# Patient Record
Sex: FEMALE | Race: WHITE | NOT HISPANIC OR LATINO
[De-identification: names, ages, dates, MRNs, and addresses within clinical notes are randomized per-mention and may not be internally consistent; named-entity substitution may affect disease eponyms.]

---

## 2017-02-13 ENCOUNTER — RX ONLY (OUTPATIENT)
Age: 72
Setting detail: RX ONLY
End: 2017-02-13

## 2017-03-14 ENCOUNTER — OFFICE VISIT (OUTPATIENT)
Dept: CARDIOLOGY | Facility: MEDICAL CENTER | Age: 72
End: 2017-03-14
Payer: MEDICARE

## 2017-03-14 VITALS
HEIGHT: 61 IN | HEART RATE: 66 BPM | DIASTOLIC BLOOD PRESSURE: 80 MMHG | BODY MASS INDEX: 33.99 KG/M2 | SYSTOLIC BLOOD PRESSURE: 140 MMHG | OXYGEN SATURATION: 96 % | WEIGHT: 180 LBS

## 2017-03-14 DIAGNOSIS — I51.89 DIASTOLIC DYSFUNCTION: Chronic | ICD-10-CM

## 2017-03-14 PROCEDURE — 4040F PNEUMOC VAC/ADMIN/RCVD: CPT | Performed by: INTERNAL MEDICINE

## 2017-03-14 PROCEDURE — G8432 DEP SCR NOT DOC, RNG: HCPCS | Performed by: INTERNAL MEDICINE

## 2017-03-14 PROCEDURE — 1036F TOBACCO NON-USER: CPT | Performed by: INTERNAL MEDICINE

## 2017-03-14 PROCEDURE — 3017F COLORECTAL CA SCREEN DOC REV: CPT | Mod: 8P | Performed by: INTERNAL MEDICINE

## 2017-03-14 PROCEDURE — 99214 OFFICE O/P EST MOD 30 MIN: CPT | Performed by: INTERNAL MEDICINE

## 2017-03-14 PROCEDURE — 3014F SCREEN MAMMO DOC REV: CPT | Performed by: INTERNAL MEDICINE

## 2017-03-14 PROCEDURE — G8484 FLU IMMUNIZE NO ADMIN: HCPCS | Performed by: INTERNAL MEDICINE

## 2017-03-14 PROCEDURE — G8419 CALC BMI OUT NRM PARAM NOF/U: HCPCS | Performed by: INTERNAL MEDICINE

## 2017-03-14 PROCEDURE — 1101F PT FALLS ASSESS-DOCD LE1/YR: CPT | Mod: 8P | Performed by: INTERNAL MEDICINE

## 2017-03-14 RX ORDER — LANSOPRAZOLE 30 MG/1
30 CAPSULE, DELAYED RELEASE ORAL 2 TIMES DAILY
COMMUNITY
End: 2023-01-18

## 2017-03-14 ASSESSMENT — ENCOUNTER SYMPTOMS
LOSS OF CONSCIOUSNESS: 0
DIZZINESS: 0
COUGH: 0
EYES NEGATIVE: 1
HEARTBURN: 0
WEIGHT LOSS: 0
INSOMNIA: 0
NAUSEA: 0
PALPITATIONS: 0
BRUISES/BLEEDS EASILY: 0
HEADACHES: 0
WHEEZING: 0
NERVOUS/ANXIOUS: 0
DEPRESSION: 0

## 2017-03-14 NOTE — PROGRESS NOTES
Subjective:   Maryam Gomez is a 71 y.o. female who presents today in regards to her history of diastolic dysfunction and HTN    Doing well  All meds as directed  changing care from my other partner  No setbacks, lost her 97yo mother - sad, in counselling  Has 6 cats - loves animals    Past Medical History   Diagnosis Date   • Anxiety    • Polio    • Osteopenia    • Hypothyroid    • Hypertension    • Hypercholesteremia    • GERD (gastroesophageal reflux disease)    • Cramp of limb 3/16/2012   • Diastolic dysfunction 3/16/2012   • Edema 3/16/2012   • Hyperlipidemia 3/16/2012   • Palpitations 3/16/2012   • Indigestion    • Hiatus hernia syndrome    • Glaucoma    • DVT (deep venous thrombosis) (CMS-HCC)      after a fall with 6mo coumadin   • Arthritis      osteoarthritis   • Heart burn      right shoulder     Past Surgical History   Procedure Laterality Date   • Hysterectomy laparoscopy  1984   • Other  1990     nose   • Tonsillectomy  1965   • Hip arthroplasty total  11/14/2012     Performed by Joseph Ricketts M.D. at [facility]   • Foot surgery  1993   • Shoulder decompression arthroscopic Right 5/18/2016     Procedure: SHOULDER DECOMPRESSION ARTHROSCOPIC - SUBACROMIAL;  Surgeon: Joseph Ricketts M.D.;  Location: SURGERY [facility];  Service:    • Shoulder arthroscopy w/ rotator cuff repair Right 5/18/2016     Procedure: SHOULDER ARTHROSCOPY W/ ROTATOR CUFF REPAIR ;  Surgeon: Joseph Ricketts M.D.;  Location: SURGERY [facility];  Service:      Family History   Problem Relation Age of Onset   • Heart Attack Neg Hx    • Heart Disease Neg Hx    • Heart Failure Neg Hx      History   Smoking status   • Former Smoker -- 1.00 packs/day for 27 years   • Types: Cigarettes   • Quit date: 01/01/1989   Smokeless tobacco   • Not on file     Comment: Quit 1989     No Known Allergies  Outpatient Encounter Prescriptions as of 3/14/2017   Medication Sig Dispense Refill   • aspirin EC  (ECOTRIN) 81 MG TBEC Take 81 mg by mouth every day.     • Acetaminophen (TYLENOL PO) Take 500 mg by mouth every day.     • Multiple Vitamins-Minerals (MULTI COMPLETE PO) Take  by mouth.     • Calcium-Magnesium-Vitamin D (CITRACAL CALCIUM+D PO) Take  by mouth.     • Glucosamine-Chondroitin (COSAMIN DS PO) Take  by mouth.     • B Complex Vitamins (VITAMIN B COMPLEX PO) Take  by mouth.     • LUTEIN by Does not apply route.     • latanoprost (XALATAN) 0.005 % SOLN Place 1 Drop in both eyes every evening.       • LACTASE ENZYME PO Take 2 Tabs by mouth every day.       • levothyroxine (SYNTHROID) 100 MCG TABS Take 100 mcg by mouth. 1 tab po 6 days a week     • lisinopril (PRINIVIL) 10 MG TABS Take 10 mg by mouth every day.     • atenolol (TENORMIN) 25 MG TABS Take 25 mg by mouth every day.     • simvastatin (ZOCOR) 10 MG TABS Take 10 mg by mouth every evening.     • loratadine (ALAVERT) 10 MG TABS Take 10 mg by mouth every day.     • lansoprazole (PREVACID) 30 MG CAPSULE DELAYED RELEASE Take 30 mg by mouth 2 times a day. One tab before breakfast/dinner     • Dexlansoprazole (DEXILANT) 60 MG CPDR Take 60 mg by mouth 2 Times a Day.       • estropipate (OGEN) 1.5 MG TABS Take 1.5 mg by mouth every day.       No facility-administered encounter medications on file as of 3/14/2017.     Review of Systems   Constitutional: Negative for weight loss and malaise/fatigue.   HENT: Negative for hearing loss.    Eyes: Negative.    Respiratory: Negative for cough and wheezing.    Cardiovascular: Negative for chest pain, palpitations and leg swelling.   Gastrointestinal: Negative for heartburn and nausea.   Genitourinary: Negative.    Neurological: Negative for dizziness, loss of consciousness and headaches.   Endo/Heme/Allergies: Does not bruise/bleed easily.   Psychiatric/Behavioral: Negative for depression. The patient is not nervous/anxious and does not have insomnia.    All other systems reviewed and are negative.       Objective:  "  /80 mmHg  Pulse 66  Ht 1.549 m (5' 0.98\")  Wt 81.647 kg (180 lb)  BMI 34.03 kg/m2  SpO2 96%    Physical Exam   Constitutional: She is oriented to person, place, and time. She appears well-developed and well-nourished.   HENT:   Head: Atraumatic.   Eyes: EOM are normal. Pupils are equal, round, and reactive to light. No scleral icterus.   Neck: No JVD present.   Cardiovascular: Normal rate, regular rhythm and intact distal pulses.    No murmur heard.  Pulmonary/Chest: Breath sounds normal.   Musculoskeletal: She exhibits no edema.   Lymphadenopathy:     She has no cervical adenopathy.   Neurological: She is alert and oriented to person, place, and time. No cranial nerve deficit.   Skin: Skin is warm and dry.   Psychiatric: She has a normal mood and affect. Her behavior is normal.       Assessment:     1. Diastolic dysfunction         Medical Decision Making:  Today's Assessment / Status / Plan:     Diastolic dysfunction, no sxs, discussed pathophysiology    HTN, Spent more than 25 min reviewing her chart  Echo from last year - normal & reassuring    Diet & exercise   No CHF symptoms, BP should be <130/70 - we discussed this  RTC 1y, sooner if concerns      "

## 2017-03-14 NOTE — MR AVS SNAPSHOT
"        Maryam Byrdmaria t   3/14/2017 9:45 AM   Office Visit   MRN: [id number]    Department:  [facility]   Dept Phone:  [phone]    Description:  Female : 1945   Provider:  Shelly Lane M.D.           Reason for Visit     Follow-Up           Allergies as of 3/14/2017     No Known Allergies      You were diagnosed with     Diastolic dysfunction   [981830]         Vital Signs     Blood Pressure Pulse Height Weight Body Mass Index Oxygen Saturation    140/80 mmHg 66 1.549 m (5' 0.98\") 81.647 kg (180 lb) 34.03 kg/m2 96%    Smoking Status                   Former Smoker           Basic Information     Date Of Birth Sex Race Ethnicity Preferred Language    1945 Female White Non- English      Problem List              ICD-10-CM Priority Class Noted - Resolved    Diastolic dysfunction (Chronic) I51.9 High  3/16/2012 - Present    Edema (Chronic) R60.9 High  3/16/2012 - Present    Hyperlipidemia (Chronic) E78.5 High  3/16/2012 - Present    Palpitations (Chronic) R00.2 High  3/16/2012 - Present    Anxiety F41.9 High  3/19/2012 - Present    Osteoarthritis M19.90   2012 - Present    Benign essential HTN I10   10/12/2015 - Present    Right shoulder pain M25.511   2016 - Present      Health Maintenance        Date Due Completion Dates    IMM DTaP/Tdap/Td Vaccine (1 - Tdap) 1962 ---    PAP SMEAR 1964 ---    COLONOSCOPY 1993 ---    IMM ZOSTER VACCINE 2003 ---    IMM PNEUMOCOCCAL 65+ (ADULT) LOW/MEDIUM RISK SERIES (2 of 2 - PCV13) 2011    IMM INFLUENZA (1) 2016    MAMMOGRAM 2017, 11/10/2015, 2014, 10/27/2014, 10/24/2013, 10/22/2012, 10/21/2011, 10/20/2010, 10/19/2009, 10/19/2009, 10/16/2008, 10/16/2008, 10/8/2007, 10/8/2007, 10/6/2006, 10/3/2005, 2004    BONE DENSITY 3/4/2020 3/4/2015, 2013, 2011, 2009, 2007, 10/20/2004            Current Immunizations     Influenza TIV (IM) 2012   " Pneumococcal polysaccharide vaccine (PPSV-23) 1/1/2010      Below and/or attached are the medications your provider expects you to take. Review all of your home medications and newly ordered medications with your provider and/or pharmacist. Follow medication instructions as directed by your provider and/or pharmacist. Please keep your medication list with you and share with your provider. Update the information when medications are discontinued, doses are changed, or new medications (including over-the-counter products) are added; and carry medication information at all times in the event of emergency situations     Allergies:  No Known Allergies          Medications  Valid as of: March 14, 2017 - 10:10 AM    Generic Name Brand Name Tablet Size Instructions for use    Acetaminophen   Take 500 mg by mouth every day.        Aspirin (Tablet Delayed Response) ECOTRIN 81 MG Take 81 mg by mouth every day.        Atenolol (Tab) TENORMIN 25 MG Take 25 mg by mouth every day.        B Complex Vitamins   Take  by mouth.        Calcium-Magnesium-Vitamin D   Take  by mouth.        Dexlansoprazole (CAPSULE DELAYED RELEASE) Dexlansoprazole 60 MG Take 60 mg by mouth 2 Times a Day.          Estropipate (Tab) OGEN 1.5 MG Take 1.5 mg by mouth every day.        Glucosamine-Chondroitin   Take  by mouth.        Lactase   Take 2 Tabs by mouth every day.          Lansoprazole (CAPSULE DELAYED RELEASE) PREVACID 30 MG Take 30 mg by mouth 2 times a day. One tab before breakfast/dinner        Latanoprost (Solution) XALATAN 0.005 % Place 1 Drop in both eyes every evening.          Levothyroxine Sodium (Tab) SYNTHROID 100 MCG Take 100 mcg by mouth. 1 tab po 6 days a week        Lisinopril (Tab) PRINIVIL 10 MG Take 10 mg by mouth every day.        Loratadine (Tab) CLARITIN 10 MG Take 10 mg by mouth every day.        Lutein   by Does not apply route.        Multiple Vitamins-Minerals   Take  by mouth.        Simvastatin (Tab) ZOCOR 10 MG Take 10  mg by mouth every evening.        .                 Medicines prescribed today were sent to:     [id number] - [place], [place] - [address] [facility] [address] [facility] [facility]    [address] [place] [address] [place] [address]    Phone: [phone] Fax: [phone]    Open 24 Hours?: No      Medication refill instructions:       If your prescription bottle indicates you have medication refills left, it is not necessary to call your provider’s office. Please contact your pharmacy and they will refill your medication.    If your prescription bottle indicates you do not have any refills left, you may request refills at any time through one of the following ways: The online [employer] system (except Urgent Care), by calling your provider’s office, or by asking your pharmacy to contact your provider’s office with a refill request. Medication refills are processed only during regular business hours and may not be available until the next business day. Your provider may request additional information or to have a follow-up visit with you prior to refilling your medication.   *Please Note: Medication refills are assigned a new Rx number when refilled electronically. Your pharmacy may indicate that no refills were authorized even though a new prescription for the same medication is available at the pharmacy. Please request the medicine by name with the pharmacy before contacting your provider for a refill.           Rufus Buck Productionhart Status: Patient Declined

## 2017-09-13 ENCOUNTER — HOSPITAL ENCOUNTER (OUTPATIENT)
Dept: LAB | Facility: MEDICAL CENTER | Age: 72
End: 2017-09-13
Attending: FAMILY MEDICINE
Payer: MEDICARE

## 2017-09-13 LAB
ALBUMIN SERPL BCP-MCNC: 4.5 G/DL (ref 3.2–4.9)
ALBUMIN/GLOB SERPL: 1.7 G/DL
ALP SERPL-CCNC: 55 U/L (ref 30–99)
ALT SERPL-CCNC: 26 U/L (ref 2–50)
ANION GAP SERPL CALC-SCNC: 8 MMOL/L (ref 0–11.9)
APPEARANCE UR: ABNORMAL
AST SERPL-CCNC: 25 U/L (ref 12–45)
BACTERIA #/AREA URNS HPF: ABNORMAL /HPF
BASOPHILS # BLD AUTO: 1.3 % (ref 0–1.8)
BASOPHILS # BLD: 0.06 K/UL (ref 0–0.12)
BILIRUB SERPL-MCNC: 0.5 MG/DL (ref 0.1–1.5)
BILIRUB UR QL STRIP.AUTO: NEGATIVE
BUN SERPL-MCNC: 22 MG/DL (ref 8–22)
CALCIUM SERPL-MCNC: 9.4 MG/DL (ref 8.5–10.5)
CHLORIDE SERPL-SCNC: 104 MMOL/L (ref 96–112)
CHOLEST SERPL-MCNC: 234 MG/DL (ref 100–199)
CO2 SERPL-SCNC: 24 MMOL/L (ref 20–33)
COLOR UR: YELLOW
CREAT SERPL-MCNC: 0.65 MG/DL (ref 0.5–1.4)
EOSINOPHIL # BLD AUTO: 0.46 K/UL (ref 0–0.51)
EOSINOPHIL NFR BLD: 10 % (ref 0–6.9)
EPI CELLS #/AREA URNS HPF: ABNORMAL /HPF
ERYTHROCYTE [DISTWIDTH] IN BLOOD BY AUTOMATED COUNT: 47.8 FL (ref 35.9–50)
EST. AVERAGE GLUCOSE BLD GHB EST-MCNC: 123 MG/DL
GFR SERPL CREATININE-BSD FRML MDRD: >60 ML/MIN/1.73 M 2
GLOBULIN SER CALC-MCNC: 2.6 G/DL (ref 1.9–3.5)
GLUCOSE SERPL-MCNC: 103 MG/DL (ref 65–99)
GLUCOSE UR STRIP.AUTO-MCNC: NEGATIVE MG/DL
HBA1C MFR BLD: 5.9 % (ref 0–5.6)
HCT VFR BLD AUTO: 41.4 % (ref 37–47)
HDLC SERPL-MCNC: 51 MG/DL
HGB BLD-MCNC: 13.8 G/DL (ref 12–16)
HYALINE CASTS #/AREA URNS LPF: ABNORMAL /LPF
IMM GRANULOCYTES # BLD AUTO: 0.01 K/UL (ref 0–0.11)
IMM GRANULOCYTES NFR BLD AUTO: 0.2 % (ref 0–0.9)
KETONES UR STRIP.AUTO-MCNC: NEGATIVE MG/DL
LDLC SERPL CALC-MCNC: 120 MG/DL
LEUKOCYTE ESTERASE UR QL STRIP.AUTO: ABNORMAL
LYMPHOCYTES # BLD AUTO: 1.79 K/UL (ref 1–4.8)
LYMPHOCYTES NFR BLD: 38.7 % (ref 22–41)
MCH RBC QN AUTO: 31.3 PG (ref 27–33)
MCHC RBC AUTO-ENTMCNC: 33.3 G/DL (ref 33.6–35)
MCV RBC AUTO: 93.9 FL (ref 81.4–97.8)
MICRO URNS: ABNORMAL
MONOCYTES # BLD AUTO: 0.38 K/UL (ref 0–0.85)
MONOCYTES NFR BLD AUTO: 8.2 % (ref 0–13.4)
NEUTROPHILS # BLD AUTO: 1.92 K/UL (ref 2–7.15)
NEUTROPHILS NFR BLD: 41.6 % (ref 44–72)
NITRITE UR QL STRIP.AUTO: NEGATIVE
NRBC # BLD AUTO: 0 K/UL
NRBC BLD AUTO-RTO: 0 /100 WBC
PH UR STRIP.AUTO: 5.5 [PH]
PLATELET # BLD AUTO: 197 K/UL (ref 164–446)
PMV BLD AUTO: 8.9 FL (ref 9–12.9)
POTASSIUM SERPL-SCNC: 4.3 MMOL/L (ref 3.6–5.5)
PROT SERPL-MCNC: 7.1 G/DL (ref 6–8.2)
PROT UR QL STRIP: NEGATIVE MG/DL
RBC # BLD AUTO: 4.41 M/UL (ref 4.2–5.4)
RBC # URNS HPF: ABNORMAL /HPF
RBC UR QL AUTO: NEGATIVE
RENAL EPI CELLS #/AREA URNS HPF: ABNORMAL /HPF
SODIUM SERPL-SCNC: 136 MMOL/L (ref 135–145)
SP GR UR STRIP.AUTO: 1.02
TRIGL SERPL-MCNC: 317 MG/DL (ref 0–149)
TSH SERPL DL<=0.005 MIU/L-ACNC: 0.49 UIU/ML (ref 0.3–3.7)
UROBILINOGEN UR STRIP.AUTO-MCNC: 0.2 MG/DL
WBC # BLD AUTO: 4.6 K/UL (ref 4.8–10.8)
WBC #/AREA URNS HPF: ABNORMAL /HPF

## 2017-09-13 PROCEDURE — 80053 COMPREHEN METABOLIC PANEL: CPT

## 2017-09-13 PROCEDURE — 36415 COLL VENOUS BLD VENIPUNCTURE: CPT

## 2017-09-13 PROCEDURE — 85025 COMPLETE CBC W/AUTO DIFF WBC: CPT

## 2017-09-13 PROCEDURE — 84443 ASSAY THYROID STIM HORMONE: CPT

## 2017-09-13 PROCEDURE — 81001 URINALYSIS AUTO W/SCOPE: CPT

## 2017-09-13 PROCEDURE — 83036 HEMOGLOBIN GLYCOSYLATED A1C: CPT | Mod: GA

## 2017-09-13 PROCEDURE — 80061 LIPID PANEL: CPT

## 2017-11-27 ENCOUNTER — HOSPITAL ENCOUNTER (OUTPATIENT)
Dept: RADIOLOGY | Facility: MEDICAL CENTER | Age: 72
End: 2017-11-27
Attending: FAMILY MEDICINE
Payer: MEDICARE

## 2017-11-27 DIAGNOSIS — Z12.31 SCREENING MAMMOGRAM, ENCOUNTER FOR: ICD-10-CM

## 2017-11-27 PROCEDURE — G0202 SCR MAMMO BI INCL CAD: HCPCS

## 2017-12-07 ENCOUNTER — HOSPITAL ENCOUNTER (OUTPATIENT)
Dept: RADIOLOGY | Facility: MEDICAL CENTER | Age: 72
End: 2017-12-07
Attending: FAMILY MEDICINE
Payer: MEDICARE

## 2017-12-07 DIAGNOSIS — M89.9 DISORDER OF BONE AND CARTILAGE: ICD-10-CM

## 2017-12-07 DIAGNOSIS — M94.9 DISORDER OF BONE AND CARTILAGE: ICD-10-CM

## 2017-12-07 PROCEDURE — 77080 DXA BONE DENSITY AXIAL: CPT

## 2018-02-05 ENCOUNTER — APPOINTMENT (RX ONLY)
Dept: URBAN - METROPOLITAN AREA CLINIC 4 | Facility: CLINIC | Age: 73
Setting detail: DERMATOLOGY
End: 2018-02-05

## 2018-02-05 DIAGNOSIS — L57.8 OTHER SKIN CHANGES DUE TO CHRONIC EXPOSURE TO NONIONIZING RADIATION: ICD-10-CM

## 2018-02-05 DIAGNOSIS — D18.0 HEMANGIOMA: ICD-10-CM

## 2018-02-05 DIAGNOSIS — L57.0 ACTINIC KERATOSIS: ICD-10-CM

## 2018-02-05 DIAGNOSIS — D22 MELANOCYTIC NEVI: ICD-10-CM

## 2018-02-05 DIAGNOSIS — L90.5 SCAR CONDITIONS AND FIBROSIS OF SKIN: ICD-10-CM

## 2018-02-05 DIAGNOSIS — Z85.828 PERSONAL HISTORY OF OTHER MALIGNANT NEOPLASM OF SKIN: ICD-10-CM

## 2018-02-05 DIAGNOSIS — L82.1 OTHER SEBORRHEIC KERATOSIS: ICD-10-CM

## 2018-02-05 DIAGNOSIS — L81.4 OTHER MELANIN HYPERPIGMENTATION: ICD-10-CM

## 2018-02-05 PROBLEM — D22.5 MELANOCYTIC NEVI OF TRUNK: Status: ACTIVE | Noted: 2018-02-05

## 2018-02-05 PROBLEM — D18.01 HEMANGIOMA OF SKIN AND SUBCUTANEOUS TISSUE: Status: ACTIVE | Noted: 2018-02-05

## 2018-02-05 PROCEDURE — ? COUNSELING

## 2018-02-05 PROCEDURE — ? LIQUID NITROGEN

## 2018-02-05 PROCEDURE — ? PATIENT SPECIFIC COUNSELING

## 2018-02-05 PROCEDURE — 17000 DESTRUCT PREMALG LESION: CPT

## 2018-02-05 PROCEDURE — 17003 DESTRUCT PREMALG LES 2-14: CPT

## 2018-02-05 PROCEDURE — 99213 OFFICE O/P EST LOW 20 MIN: CPT | Mod: 25

## 2018-02-05 ASSESSMENT — LOCATION SIMPLE DESCRIPTION DERM
LOCATION SIMPLE: LEFT HAND
LOCATION SIMPLE: RIGHT CHEEK
LOCATION SIMPLE: LEFT WRIST
LOCATION SIMPLE: LEFT FOREHEAD
LOCATION SIMPLE: LEFT EAR
LOCATION SIMPLE: LEFT UPPER BACK
LOCATION SIMPLE: INFERIOR FOREHEAD
LOCATION SIMPLE: RIGHT ANTERIOR NECK
LOCATION SIMPLE: RIGHT HAND
LOCATION SIMPLE: RIGHT UPPER BACK

## 2018-02-05 ASSESSMENT — LOCATION DETAILED DESCRIPTION DERM
LOCATION DETAILED: INFERIOR MID FOREHEAD
LOCATION DETAILED: LEFT RADIAL DORSAL HAND
LOCATION DETAILED: RIGHT INFERIOR CENTRAL MALAR CHEEK
LOCATION DETAILED: RIGHT SUPERIOR UPPER BACK
LOCATION DETAILED: RIGHT INFERIOR MEDIAL UPPER BACK
LOCATION DETAILED: RIGHT INFERIOR ANTERIOR NECK
LOCATION DETAILED: LEFT DORSAL WRIST
LOCATION DETAILED: LEFT INFERIOR MEDIAL FOREHEAD
LOCATION DETAILED: LEFT ANTIHELIX
LOCATION DETAILED: RIGHT RADIAL DORSAL HAND
LOCATION DETAILED: LEFT SUPERIOR UPPER BACK
LOCATION DETAILED: LEFT ANTITRAGUS
LOCATION DETAILED: LEFT ULNAR DORSAL HAND

## 2018-02-05 ASSESSMENT — LOCATION ZONE DERM
LOCATION ZONE: ARM
LOCATION ZONE: HAND
LOCATION ZONE: TRUNK
LOCATION ZONE: NECK
LOCATION ZONE: EAR
LOCATION ZONE: FACE

## 2018-02-05 NOTE — HPI: FOLLOW UP OTHER
What Is Your Reason For Requesting A Follow-Up Appointment?: Skin check on exposed areas face and arms

## 2018-02-05 NOTE — PROCEDURE: LIQUID NITROGEN
Aperture Size (Optional): C
Consent: The patient's consent was obtained including but not limited to risks of crusting, scabbing, blistering, scarring, darker or lighter pigmentary change, recurrence, incomplete removal and infection.
Post-Care Instructions: I reviewed with the patient in detail post-care instructions. Patient is to wear sunprotection, and avoid picking at any of the treated lesions. Pt may apply Vaseline to crusted or scabbing areas.
Detail Level: Simple
Number Of Freeze-Thaw Cycles: 2 freeze-thaw cycles
Duration Of Freeze Thaw-Cycle (Seconds): 3
Render Post-Care Instructions In Note?: no

## 2018-03-08 ENCOUNTER — HOSPITAL ENCOUNTER (OUTPATIENT)
Facility: MEDICAL CENTER | Age: 73
End: 2018-03-08
Attending: FAMILY MEDICINE
Payer: MEDICARE

## 2018-03-08 LAB
APPEARANCE UR: CLEAR
BACTERIA #/AREA URNS HPF: NEGATIVE /HPF
BILIRUB UR QL STRIP.AUTO: NEGATIVE
COLOR UR: YELLOW
CULTURE IF INDICATED INDCX: YES UA CULTURE
EPI CELLS #/AREA URNS HPF: ABNORMAL /HPF
GLUCOSE UR STRIP.AUTO-MCNC: NEGATIVE MG/DL
HYALINE CASTS #/AREA URNS LPF: ABNORMAL /LPF
KETONES UR STRIP.AUTO-MCNC: NEGATIVE MG/DL
LEUKOCYTE ESTERASE UR QL STRIP.AUTO: ABNORMAL
MICRO URNS: ABNORMAL
NITRITE UR QL STRIP.AUTO: NEGATIVE
PH UR STRIP.AUTO: 6 [PH]
PROT UR QL STRIP: NEGATIVE MG/DL
RBC # URNS HPF: ABNORMAL /HPF
RBC UR QL AUTO: NEGATIVE
SP GR UR STRIP.AUTO: 1.02
UROBILINOGEN UR STRIP.AUTO-MCNC: 0.2 MG/DL
WBC #/AREA URNS HPF: ABNORMAL /HPF

## 2018-03-08 PROCEDURE — 81001 URINALYSIS AUTO W/SCOPE: CPT

## 2018-03-08 PROCEDURE — 87086 URINE CULTURE/COLONY COUNT: CPT

## 2018-03-10 LAB
BACTERIA UR CULT: NORMAL
SIGNIFICANT IND 70042: NORMAL
SITE SITE: NORMAL
SOURCE SOURCE: NORMAL

## 2018-04-02 ENCOUNTER — OFFICE VISIT (OUTPATIENT)
Dept: CARDIOLOGY | Facility: MEDICAL CENTER | Age: 73
End: 2018-04-02
Payer: MEDICARE

## 2018-04-02 VITALS
DIASTOLIC BLOOD PRESSURE: 80 MMHG | BODY MASS INDEX: 35.5 KG/M2 | HEART RATE: 76 BPM | HEIGHT: 61 IN | WEIGHT: 188 LBS | OXYGEN SATURATION: 96 % | SYSTOLIC BLOOD PRESSURE: 110 MMHG

## 2018-04-02 DIAGNOSIS — I51.89 DIASTOLIC DYSFUNCTION: Chronic | ICD-10-CM

## 2018-04-02 DIAGNOSIS — E78.2 MIXED HYPERLIPIDEMIA: ICD-10-CM

## 2018-04-02 DIAGNOSIS — R60.0 LOCALIZED EDEMA: Chronic | ICD-10-CM

## 2018-04-02 DIAGNOSIS — I10 BENIGN ESSENTIAL HTN: Primary | ICD-10-CM

## 2018-04-02 PROCEDURE — 99214 OFFICE O/P EST MOD 30 MIN: CPT | Performed by: NURSE PRACTITIONER

## 2018-04-02 RX ORDER — RANITIDINE 150 MG/1
150 TABLET ORAL 2 TIMES DAILY
COMMUNITY
End: 2021-05-16

## 2018-04-02 RX ORDER — CARVEDILOL 6.25 MG/1
6.25 TABLET ORAL 2 TIMES DAILY
COMMUNITY

## 2018-04-02 RX ORDER — ATORVASTATIN CALCIUM 20 MG/1
20 TABLET, FILM COATED ORAL EVERY EVENING
Qty: 30 TAB | Refills: 11 | Status: SHIPPED | OUTPATIENT
Start: 2018-04-02

## 2018-04-02 ASSESSMENT — ENCOUNTER SYMPTOMS
SHORTNESS OF BREATH: 0
CLAUDICATION: 0
ABDOMINAL PAIN: 0
CHILLS: 0
MYALGIAS: 0
ORTHOPNEA: 0
PND: 0
COUGH: 0
DIZZINESS: 0
WEAKNESS: 0
PALPITATIONS: 0

## 2018-04-02 NOTE — PROGRESS NOTES
"Chief Complaint   Patient presents with   • Hypertension     follow up        Subjective:   Maryam \"Char\"  Emilie Gomez is a 72 y.o. female who presents today to follow-up on hypertension, edema and diastolic dysfunction. She was last seen a year ago by Dr. Shelly Lane.    Since that time, she has been fairly stable. Her blood pressures been running 120-130/70-80 at home. She denies any significant palpitations. She tells me her ankle edema is much improved.    She continues to be upset about the passing of her 98-year-old mother. 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    Hormone replacement therapy was discontinued about 6 months ago per her primary care. Patient states she has gained about 20 pounds over that 6 month time. She is not exercising on a regular basis. She is unhappy with the way her body feels with the extra weight.    Past Medical History:   Diagnosis Date   • Anxiety    • Arthritis     osteoarthritis   • Cramp of limb 3/16/2012   • Diastolic dysfunction 3/16/2012   • DVT (deep venous thrombosis) (CMS-[facility], located [facility])     after a fall with 6mo coumadin   • Edema 3/16/2012   • GERD (gastroesophageal reflux disease)    • Glaucoma    • Heart burn     right shoulder   • Hiatus hernia syndrome    • Hypercholesteremia    • Hyperlipidemia 3/16/2012   • Hypertension    • Hypothyroid    • Indigestion    • Osteopenia    • Palpitations 3/16/2012   • Polio      Past Surgical History:   Procedure Laterality Date   • SHOULDER DECOMPRESSION ARTHROSCOPIC Right 5/18/2016    Procedure: SHOULDER DECOMPRESSION ARTHROSCOPIC - SUBACROMIAL;  Surgeon: Joseph Ricketts M.D.;  Location: SURGERY [facility];  Service:    • SHOULDER ARTHROSCOPY W/ ROTATOR CUFF REPAIR Right 5/18/2016    Procedure: SHOULDER ARTHROSCOPY W/ ROTATOR CUFF REPAIR ;  Surgeon: Joseph Ricketts M.D.;  Location: SURGERY [facility];  Service:    • HIP ARTHROPLASTY TOTAL  11/14/2012    " Performed by Joseph Ricketts M.D. at SURGERY [facility]   • FOOT SURGERY  1993   • OTHER  1990    nose   • HYSTERECTOMY LAPAROSCOPY  1984   • TONSILLECTOMY  1965     Family History   Problem Relation Age of Onset   • Heart Attack Neg Hx    • Heart Disease Neg Hx    • Heart Failure Neg Hx      Social History     Social History   • Marital status:      Spouse name: N/A   • Number of children: N/A   • Years of education: N/A     Occupational History   • Not on file.     Social History Main Topics   • Smoking status: Former Smoker     Packs/day: 1.00     Years: 27.00     Types: Cigarettes     Quit date: 1/1/1989   • Smokeless tobacco: Never Used      Comment: Quit 1989   • Alcohol use Yes      Comment: 2 per day   • Drug use: No   • Sexual activity: Not on file     Other Topics Concern   • Not on file     Social History Narrative   • No narrative on file     No Known Allergies  Outpatient Encounter Prescriptions as of 4/2/2018   Medication Sig Dispense Refill   • raNITidine (ZANTAC) 150 MG Tab Take 150 mg by mouth 2 times a day.     • carvedilol (COREG) 6.25 MG Tab Take 6.25 mg by mouth 2 times a day, with meals.     • atorvastatin (LIPITOR) 20 MG Tab Take 1 Tab by mouth every evening. 30 Tab 11   • lansoprazole (PREVACID) 30 MG CAPSULE DELAYED RELEASE Take 30 mg by mouth 2 times a day. One tab before breakfast/dinner     • aspirin EC (ECOTRIN) 81 MG TBEC Take 81 mg by mouth every day.     • Acetaminophen (TYLENOL PO) Take 500 mg by mouth every day.     • Multiple Vitamins-Minerals (MULTI COMPLETE PO) Take  by mouth.     • Calcium-Magnesium-Vitamin D (CITRACAL CALCIUM+D PO) Take  by mouth.     • Glucosamine-Chondroitin (COSAMIN DS PO) Take  by mouth.     • B Complex Vitamins (VITAMIN B COMPLEX PO) Take  by mouth.     • LUTEIN by Does not apply route.     • latanoprost (XALATAN) 0.005 % SOLN Place 1 Drop in both eyes every evening.       • levothyroxine (SYNTHROID) 100 MCG TABS Take 100 mcg by mouth. 1  "tab po 6 days a week     • lisinopril (PRINIVIL) 10 MG TABS Take 10 mg by mouth every day.     • loratadine (ALAVERT) 10 MG TABS Take 10 mg by mouth every day.     • [DISCONTINUED] Dexlansoprazole (DEXILANT) 60 MG CPDR Take 60 mg by mouth 2 Times a Day.       • [DISCONTINUED] LACTASE ENZYME PO Take 2 Tabs by mouth every day.       • [DISCONTINUED] estropipate (OGEN) 1.5 MG TABS Take 1.5 mg by mouth every day.     • [DISCONTINUED] atenolol (TENORMIN) 25 MG TABS Take 25 mg by mouth every day.     • [DISCONTINUED] simvastatin (ZOCOR) 10 MG TABS Take 10 mg by mouth every evening.       No facility-administered encounter medications on file as of 4/2/2018.      Review of Systems   Constitutional: Negative for chills and malaise/fatigue.   Respiratory: Negative for cough and shortness of breath.    Cardiovascular: Positive for leg swelling (improved). Negative for chest pain, palpitations, orthopnea, claudication and PND.   Gastrointestinal: Negative for abdominal pain.   Musculoskeletal: Negative for myalgias.   Neurological: Negative for dizziness and weakness.        Objective:   /80   Pulse 76   Ht 1.549 m (5' 1\")   Wt 85.3 kg (188 lb)   SpO2 96%   BMI 35.52 kg/m²     Physical Exam   Constitutional: She is oriented to person, place, and time. She appears well-developed and well-nourished.   HENT:   Head: Normocephalic.   Eyes: EOM are normal.   Neck: No JVD present.   Cardiovascular: Normal rate, regular rhythm and normal heart sounds.    Pulmonary/Chest: Effort normal and breath sounds normal.   Abdominal: Soft. Bowel sounds are normal.   Musculoskeletal: She exhibits edema (trace bilateral ankles.).   Neurological: She is alert and oriented to person, place, and time.   Skin: Skin is warm and dry.   Psychiatric: She has a normal mood and affect.     March 9, 2015: Transthoracic Echo Report  Normal left ventricular systolic function.  Grade I diastolic dysfunction - mitral inflow E/A is <1.0.  Mild " concentric left ventricular hypertrophy.  Mild mitral regurgitation.  Trivial pulmonic valve insufficiency.  Compared to the prior echo dated 7/8/2012, there has been no   significant change.    Results for GAYATHRI ORDONEZ (MRN [id number])    Ref. Range 9/13/2017 07:52   Sodium Latest Ref Range: 135 - 145 mmol/L 136   Potassium Latest Ref Range: 3.6 - 5.5 mmol/L 4.3   Chloride Latest Ref Range: 96 - 112 mmol/L 104   Co2 Latest Ref Range: 20 - 33 mmol/L 24   Anion Gap Latest Ref Range: 0.0 - 11.9  8.0   Glucose Latest Ref Range: 65 - 99 mg/dL 103 (H)   Bun Latest Ref Range: 8 - 22 mg/dL 22   Creatinine Latest Ref Range: 0.50 - 1.40 mg/dL 0.65   GFR If  Latest Ref Range: >60 mL/min/1.73 m 2 >60   GFR If Non  Latest Ref Range: >60 mL/min/1.73 m 2 >60   Calcium Latest Ref Range: 8.5 - 10.5 mg/dL 9.4   AST(SGOT) Latest Ref Range: 12 - 45 U/L 25   ALT(SGPT) Latest Ref Range: 2 - 50 U/L 26   Alkaline Phosphatase Latest Ref Range: 30 - 99 U/L 55   Total Bilirubin Latest Ref Range: 0.1 - 1.5 mg/dL 0.5   Albumin Latest Ref Range: 3.2 - 4.9 g/dL 4.5   Total Protein Latest Ref Range: 6.0 - 8.2 g/dL 7.1   Globulin Latest Ref Range: 1.9 - 3.5 g/dL 2.6   A-G Ratio Latest Units: g/dL 1.7   Glycohemoglobin Latest Ref Range: 0.0 - 5.6 % 5.9 (H)   Estim. Avg Glu Latest Units: mg/dL 123   Cholesterol,Tot Latest Ref Range: 100 - 199 mg/dL 234 (H)   Triglycerides Latest Ref Range: 0 - 149 mg/dL 317 (H)   HDL Latest Ref Range: >=40 mg/dL 51   LDL Latest Ref Range: <100 mg/dL 120 (H)       Assessment:     1. Benign essential HTN     2. Mixed hyperlipidemia  COMP METABOLIC PANEL    LIPID PROFILE   3. Diastolic dysfunction     4. Localized edema         Medical Decision Making:  Today's Assessment / Status / Plan:     Hypertension: Her blood pressure is excellent in the office today. She will continue on her current regimen.    Hyperlipidemia: Her last lipids which were done in September were not to  goal. She's been taking simvastatin 10 mg. Since our interactions with simvastatin, I will change her to atorvastatin 20 mg daily. We'll check lipids and metabolic panel in 3 months. Her LDL goal is less than 100. She is encouraged to limit total calories especially sweets and carbs since her triglycerides are elevated. I recommended she get some regular exercise, possibly a stationary bike since she has knee and hip problems.    Diastolic dysfunction: Grade 1 per echocardiogram done in 2015. She is stable in this regard and has no fluid overload signs.    Edema: Improved. Continue current regimen.    She does not have a cardiac problem that needs to be followed closely in our office therefore she can follow-up with her primary care. However she is on is welcome to return to practice if needed for cardiac issues.    Collaborating Provider: Dr. Norton.

## 2018-04-02 NOTE — LETTER
"     [facility]   [address], [facility] [address]  [place] [address]  Phone: [phone]  Fax: [phone]              Maryam Gomez  1945    Encounter Date: 4/2/2018    ANDREA Velez          PROGRESS NOTE:  Chief Complaint   Patient presents with   • Hypertension     follow up        Subjective:   Maryam \"Char\"  Emilie Gomez is a 72 y.o. female who presents today to follow-up on hypertension, edema and diastolic dysfunction. She was last seen a year ago by Dr. Shelly Lane.    Since that time, she has been fairly stable. Her blood pressures been running 120-130/70-80 at home. She denies any significant palpitations. She tells me her ankle edema is much improved.    She continues to be upset about the passing of her 98-year-old mother. 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    Hormone replacement therapy was discontinued about 6 months ago per her primary care. Patient states she has gained about 20 pounds over that 6 month time. She is not exercising on a regular basis. She is unhappy with the way her body feels with the extra weight.    Past Medical History:   Diagnosis Date   • Anxiety    • Arthritis     osteoarthritis   • Cramp of limb 3/16/2012   • Diastolic dysfunction 3/16/2012   • DVT (deep venous thrombosis) ([facility])     after a fall with 6mo coumadin   • Edema 3/16/2012   • GERD (gastroesophageal reflux disease)    • Glaucoma    • Heart burn     right shoulder   • Hiatus hernia syndrome    • Hypercholesteremia    • Hyperlipidemia 3/16/2012   • Hypertension    • Hypothyroid    • Indigestion    • Osteopenia    • Palpitations 3/16/2012   • Polio      Past Surgical History:   Procedure Laterality Date   • SHOULDER DECOMPRESSION ARTHROSCOPIC Right 5/18/2016    Procedure: SHOULDER DECOMPRESSION ARTHROSCOPIC - SUBACROMIAL;  Surgeon: Joseph Ricketts M.D.;  Location: SURGERY [facility]" ORS;  Service:    • SHOULDER ARTHROSCOPY W/ ROTATOR CUFF REPAIR Right 5/18/2016    Procedure: SHOULDER ARTHROSCOPY W/ ROTATOR CUFF REPAIR ;  Surgeon: Joseph Ricketts M.D.;  Location: SURGERY [facility];  Service:    • HIP ARTHROPLASTY TOTAL  11/14/2012    Performed by Joseph Ricketts M.D. at SURGERY [facility] ORS   • FOOT SURGERY  1993   • OTHER  1990    nose   • HYSTERECTOMY LAPAROSCOPY  1984   • TONSILLECTOMY  1965     Family History   Problem Relation Age of Onset   • Heart Attack Neg Hx    • Heart Disease Neg Hx    • Heart Failure Neg Hx      Social History     Social History   • Marital status:      Spouse name: N/A   • Number of children: N/A   • Years of education: N/A     Occupational History   • Not on file.     Social History Main Topics   • Smoking status: Former Smoker     Packs/day: 1.00     Years: 27.00     Types: Cigarettes     Quit date: 1/1/1989   • Smokeless tobacco: Never Used      Comment: Quit 1989   • Alcohol use Yes      Comment: 2 per day   • Drug use: No   • Sexual activity: Not on file     Other Topics Concern   • Not on file     Social History Narrative   • No narrative on file     No Known Allergies  Outpatient Encounter Prescriptions as of 4/2/2018   Medication Sig Dispense Refill   • raNITidine (ZANTAC) 150 MG Tab Take 150 mg by mouth 2 times a day.     • carvedilol (COREG) 6.25 MG Tab Take 6.25 mg by mouth 2 times a day, with meals.     • atorvastatin (LIPITOR) 20 MG Tab Take 1 Tab by mouth every evening. 30 Tab 11   • lansoprazole (PREVACID) 30 MG CAPSULE DELAYED RELEASE Take 30 mg by mouth 2 times a day. One tab before breakfast/dinner     • aspirin EC (ECOTRIN) 81 MG TBEC Take 81 mg by mouth every day.     • Acetaminophen (TYLENOL PO) Take 500 mg by mouth every day.     • Multiple Vitamins-Minerals (MULTI COMPLETE PO) Take  by mouth.     • Calcium-Magnesium-Vitamin D (CITRACAL CALCIUM+D PO) Take  by mouth.     • Glucosamine-Chondroitin (COSAMIN DS PO) Take  " by mouth.     • B Complex Vitamins (VITAMIN B COMPLEX PO) Take  by mouth.     • LUTEIN by Does not apply route.     • latanoprost (XALATAN) 0.005 % SOLN Place 1 Drop in both eyes every evening.       • levothyroxine (SYNTHROID) 100 MCG TABS Take 100 mcg by mouth. 1 tab po 6 days a week     • lisinopril (PRINIVIL) 10 MG TABS Take 10 mg by mouth every day.     • loratadine (ALAVERT) 10 MG TABS Take 10 mg by mouth every day.     • [DISCONTINUED] Dexlansoprazole (DEXILANT) 60 MG CPDR Take 60 mg by mouth 2 Times a Day.       • [DISCONTINUED] LACTASE ENZYME PO Take 2 Tabs by mouth every day.       • [DISCONTINUED] estropipate (OGEN) 1.5 MG TABS Take 1.5 mg by mouth every day.     • [DISCONTINUED] atenolol (TENORMIN) 25 MG TABS Take 25 mg by mouth every day.     • [DISCONTINUED] simvastatin (ZOCOR) 10 MG TABS Take 10 mg by mouth every evening.       No facility-administered encounter medications on file as of 4/2/2018.      Review of Systems   Constitutional: Negative for chills and malaise/fatigue.   Respiratory: Negative for cough and shortness of breath.    Cardiovascular: Positive for leg swelling (improved). Negative for chest pain, palpitations, orthopnea, claudication and PND.   Gastrointestinal: Negative for abdominal pain.   Musculoskeletal: Negative for myalgias.   Neurological: Negative for dizziness and weakness.        Objective:   /80   Pulse 76   Ht 1.549 m (5' 1\")   Wt 85.3 kg (188 lb)   SpO2 96%   BMI 35.52 kg/m²      Physical Exam   Constitutional: She is oriented to person, place, and time. She appears well-developed and well-nourished.   HENT:   Head: Normocephalic.   Eyes: EOM are normal.   Neck: No JVD present.   Cardiovascular: Normal rate, regular rhythm and normal heart sounds.    Pulmonary/Chest: Effort normal and breath sounds normal.   Abdominal: Soft. Bowel sounds are normal.   Musculoskeletal: She exhibits edema (trace bilateral ankles.).   Neurological: She is alert and oriented " to person, place, and time.   Skin: Skin is warm and dry.   Psychiatric: She has a normal mood and affect.     March 9, 2015: Transthoracic Echo Report  Normal left ventricular systolic function.  Grade I diastolic dysfunction - mitral inflow E/A is <1.0.  Mild concentric left ventricular hypertrophy.  Mild mitral regurgitation.  Trivial pulmonic valve insufficiency.  Compared to the prior echo dated 7/8/2012, there has been no   significant change.    Results for GAYATHRI ORDONEZ (MRN [id number])    Ref. Range 9/13/2017 07:52   Sodium Latest Ref Range: 135 - 145 mmol/L 136   Potassium Latest Ref Range: 3.6 - 5.5 mmol/L 4.3   Chloride Latest Ref Range: 96 - 112 mmol/L 104   Co2 Latest Ref Range: 20 - 33 mmol/L 24   Anion Gap Latest Ref Range: 0.0 - 11.9  8.0   Glucose Latest Ref Range: 65 - 99 mg/dL 103 (H)   Bun Latest Ref Range: 8 - 22 mg/dL 22   Creatinine Latest Ref Range: 0.50 - 1.40 mg/dL 0.65   GFR If  Latest Ref Range: >60 mL/min/1.73 m 2 >60   GFR If Non  Latest Ref Range: >60 mL/min/1.73 m 2 >60   Calcium Latest Ref Range: 8.5 - 10.5 mg/dL 9.4   AST(SGOT) Latest Ref Range: 12 - 45 U/L 25   ALT(SGPT) Latest Ref Range: 2 - 50 U/L 26   Alkaline Phosphatase Latest Ref Range: 30 - 99 U/L 55   Total Bilirubin Latest Ref Range: 0.1 - 1.5 mg/dL 0.5   Albumin Latest Ref Range: 3.2 - 4.9 g/dL 4.5   Total Protein Latest Ref Range: 6.0 - 8.2 g/dL 7.1   Globulin Latest Ref Range: 1.9 - 3.5 g/dL 2.6   A-G Ratio Latest Units: g/dL 1.7   Glycohemoglobin Latest Ref Range: 0.0 - 5.6 % 5.9 (H)   Estim. Avg Glu Latest Units: mg/dL 123   Cholesterol,Tot Latest Ref Range: 100 - 199 mg/dL 234 (H)   Triglycerides Latest Ref Range: 0 - 149 mg/dL 317 (H)   HDL Latest Ref Range: >=40 mg/dL 51   LDL Latest Ref Range: <100 mg/dL 120 (H)       Assessment:     1. Benign essential HTN     2. Mixed hyperlipidemia  COMP METABOLIC PANEL    LIPID PROFILE   3. Diastolic dysfunction     4. Localized edema          Medical Decision Making:  Today's Assessment / Status / Plan:     Hypertension: Her blood pressure is excellent in the office today. She will continue on her current regimen.    Hyperlipidemia: Her last lipids which were done in September were not to goal. She's been taking simvastatin 10 mg. Since our interactions with simvastatin, I will change her to atorvastatin 20 mg daily. We'll check lipids and metabolic panel in 3 months. Her LDL goal is less than 100. She is encouraged to limit total calories especially sweets and carbs since her triglycerides are elevated. I recommended she get some regular exercise, possibly a stationary bike since she has knee and hip problems.    Diastolic dysfunction: Grade 1 per echocardiogram done in 2015. She is stable in this regard and has no fluid overload signs.    Edema: Improved. Continue current regimen.    She does not have a cardiac problem that needs to be followed closely in our office therefore she can follow-up with her primary care. However she is on is welcome to return to practice if needed for cardiac issues.    Collaborating Provider: Dr. Norton.        No Recipients

## 2018-04-05 ENCOUNTER — HOSPITAL ENCOUNTER (OUTPATIENT)
Facility: MEDICAL CENTER | Age: 73
End: 2018-04-05
Attending: FAMILY MEDICINE
Payer: MEDICARE

## 2018-04-05 LAB
APPEARANCE UR: CLEAR
BACTERIA #/AREA URNS HPF: NEGATIVE /HPF
BILIRUB UR QL STRIP.AUTO: NEGATIVE
COLOR UR: YELLOW
EPI CELLS #/AREA URNS HPF: NEGATIVE /HPF
GLUCOSE UR STRIP.AUTO-MCNC: NEGATIVE MG/DL
HYALINE CASTS #/AREA URNS LPF: NORMAL /LPF
KETONES UR STRIP.AUTO-MCNC: NEGATIVE MG/DL
LEUKOCYTE ESTERASE UR QL STRIP.AUTO: ABNORMAL
MICRO URNS: ABNORMAL
NITRITE UR QL STRIP.AUTO: NEGATIVE
PH UR STRIP.AUTO: 5.5 [PH]
PROT UR QL STRIP: NEGATIVE MG/DL
RBC # URNS HPF: NORMAL /HPF
RBC UR QL AUTO: NEGATIVE
SP GR UR STRIP.AUTO: 1.02
UROBILINOGEN UR STRIP.AUTO-MCNC: 0.2 MG/DL
WBC #/AREA URNS HPF: NORMAL /HPF

## 2018-04-05 PROCEDURE — 81001 URINALYSIS AUTO W/SCOPE: CPT

## 2018-06-07 ENCOUNTER — HOSPITAL ENCOUNTER (OUTPATIENT)
Dept: RADIOLOGY | Facility: MEDICAL CENTER | Age: 73
End: 2018-06-07
Attending: FAMILY MEDICINE
Payer: MEDICARE

## 2018-06-07 DIAGNOSIS — R05.9 COUGH: ICD-10-CM

## 2018-06-07 PROCEDURE — 71046 X-RAY EXAM CHEST 2 VIEWS: CPT

## 2018-07-26 ENCOUNTER — HOSPITAL ENCOUNTER (OUTPATIENT)
Dept: LAB | Facility: MEDICAL CENTER | Age: 73
End: 2018-07-26
Attending: NURSE PRACTITIONER
Payer: MEDICARE

## 2018-07-26 DIAGNOSIS — E78.2 MIXED HYPERLIPIDEMIA: ICD-10-CM

## 2018-07-26 LAB
ALBUMIN SERPL BCP-MCNC: 4.3 G/DL (ref 3.2–4.9)
ALBUMIN/GLOB SERPL: 1.8 G/DL
ALP SERPL-CCNC: 57 U/L (ref 30–99)
ALT SERPL-CCNC: 26 U/L (ref 2–50)
ANION GAP SERPL CALC-SCNC: 9 MMOL/L (ref 0–11.9)
AST SERPL-CCNC: 22 U/L (ref 12–45)
BILIRUB SERPL-MCNC: 0.7 MG/DL (ref 0.1–1.5)
BUN SERPL-MCNC: 21 MG/DL (ref 8–22)
CALCIUM SERPL-MCNC: 9.3 MG/DL (ref 8.5–10.5)
CHLORIDE SERPL-SCNC: 107 MMOL/L (ref 96–112)
CHOLEST SERPL-MCNC: 147 MG/DL (ref 100–199)
CO2 SERPL-SCNC: 24 MMOL/L (ref 20–33)
CREAT SERPL-MCNC: 0.6 MG/DL (ref 0.5–1.4)
GLOBULIN SER CALC-MCNC: 2.4 G/DL (ref 1.9–3.5)
GLUCOSE SERPL-MCNC: 102 MG/DL (ref 65–99)
HDLC SERPL-MCNC: 50 MG/DL
LDLC SERPL CALC-MCNC: 55 MG/DL
POTASSIUM SERPL-SCNC: 4.1 MMOL/L (ref 3.6–5.5)
PROT SERPL-MCNC: 6.7 G/DL (ref 6–8.2)
SODIUM SERPL-SCNC: 140 MMOL/L (ref 135–145)
TRIGL SERPL-MCNC: 210 MG/DL (ref 0–149)

## 2018-07-26 PROCEDURE — 80053 COMPREHEN METABOLIC PANEL: CPT

## 2018-07-26 PROCEDURE — 36415 COLL VENOUS BLD VENIPUNCTURE: CPT

## 2018-07-26 PROCEDURE — 80061 LIPID PANEL: CPT

## 2018-08-03 ENCOUNTER — APPOINTMENT (OUTPATIENT)
Dept: RADIOLOGY | Facility: MEDICAL CENTER | Age: 73
DRG: 395 | End: 2018-08-03
Attending: EMERGENCY MEDICINE
Payer: COMMERCIAL

## 2018-08-03 ENCOUNTER — HOSPITAL ENCOUNTER (INPATIENT)
Facility: MEDICAL CENTER | Age: 73
LOS: 2 days | DRG: 395 | End: 2018-08-05
Attending: EMERGENCY MEDICINE | Admitting: HOSPITALIST
Payer: COMMERCIAL

## 2018-08-03 DIAGNOSIS — K92.2 LOWER GI BLEED: ICD-10-CM

## 2018-08-03 LAB
ALBUMIN SERPL BCP-MCNC: 4.8 G/DL (ref 3.2–4.9)
ALBUMIN/GLOB SERPL: 2.2 G/DL
ALP SERPL-CCNC: 67 U/L (ref 30–99)
ALT SERPL-CCNC: 36 U/L (ref 2–50)
ANION GAP SERPL CALC-SCNC: 11 MMOL/L (ref 0–11.9)
APTT PPP: 24.7 SEC (ref 24.7–36)
AST SERPL-CCNC: 26 U/L (ref 12–45)
BASOPHILS # BLD AUTO: 0.6 % (ref 0–1.8)
BASOPHILS # BLD: 0.05 K/UL (ref 0–0.12)
BILIRUB SERPL-MCNC: 0.6 MG/DL (ref 0.1–1.5)
BUN SERPL-MCNC: 25 MG/DL (ref 8–22)
CALCIUM SERPL-MCNC: 9.4 MG/DL (ref 8.5–10.5)
CFT BLD TEG: 5.4 MIN (ref 5–10)
CHLORIDE SERPL-SCNC: 107 MMOL/L (ref 96–112)
CLOT ANGLE BLD TEG: 57.8 DEGREES (ref 53–72)
CLOT LYSIS 30M P MA LENFR BLD TEG: 0.3 % (ref 0–8)
CO2 SERPL-SCNC: 22 MMOL/L (ref 20–33)
CREAT SERPL-MCNC: 0.57 MG/DL (ref 0.5–1.4)
CT.EXTRINSIC BLD ROTEM: 2.6 MIN (ref 1–3)
EOSINOPHIL # BLD AUTO: 0.32 K/UL (ref 0–0.51)
EOSINOPHIL NFR BLD: 4.1 % (ref 0–6.9)
ERYTHROCYTE [DISTWIDTH] IN BLOOD BY AUTOMATED COUNT: 46.5 FL (ref 35.9–50)
GLOBULIN SER CALC-MCNC: 2.2 G/DL (ref 1.9–3.5)
GLUCOSE SERPL-MCNC: 116 MG/DL (ref 65–99)
HCT VFR BLD AUTO: 41.9 % (ref 37–47)
HGB BLD-MCNC: 12.6 G/DL (ref 12–16)
HGB BLD-MCNC: 14.3 G/DL (ref 12–16)
IMM GRANULOCYTES # BLD AUTO: 0.04 K/UL (ref 0–0.11)
IMM GRANULOCYTES NFR BLD AUTO: 0.5 % (ref 0–0.9)
INR PPP: 1.14 (ref 0.87–1.13)
LYMPHOCYTES # BLD AUTO: 1.8 K/UL (ref 1–4.8)
LYMPHOCYTES NFR BLD: 22.8 % (ref 22–41)
MAGNESIUM SERPL-MCNC: 1.9 MG/DL (ref 1.5–2.5)
MCF BLD TEG: 58.5 MM (ref 50–70)
MCH RBC QN AUTO: 31.6 PG (ref 27–33)
MCHC RBC AUTO-ENTMCNC: 34.1 G/DL (ref 33.6–35)
MCV RBC AUTO: 92.5 FL (ref 81.4–97.8)
MONOCYTES # BLD AUTO: 0.5 K/UL (ref 0–0.85)
MONOCYTES NFR BLD AUTO: 6.3 % (ref 0–13.4)
NEUTROPHILS # BLD AUTO: 5.17 K/UL (ref 2–7.15)
NEUTROPHILS NFR BLD: 65.7 % (ref 44–72)
NRBC # BLD AUTO: 0 K/UL
NRBC BLD-RTO: 0 /100 WBC
PLATELET # BLD AUTO: 191 K/UL (ref 164–446)
PMV BLD AUTO: 8.4 FL (ref 9–12.9)
POTASSIUM SERPL-SCNC: 4 MMOL/L (ref 3.6–5.5)
PROT SERPL-MCNC: 7 G/DL (ref 6–8.2)
PROTHROMBIN TIME: 14.3 SEC (ref 12–14.6)
RBC # BLD AUTO: 4.53 M/UL (ref 4.2–5.4)
SODIUM SERPL-SCNC: 140 MMOL/L (ref 135–145)
TEG ALGORITHM TGALG: NORMAL
TSH SERPL DL<=0.005 MIU/L-ACNC: 0.35 UIU/ML (ref 0.38–5.33)
WBC # BLD AUTO: 7.9 K/UL (ref 4.8–10.8)

## 2018-08-03 PROCEDURE — 85610 PROTHROMBIN TIME: CPT

## 2018-08-03 PROCEDURE — 80053 COMPREHEN METABOLIC PANEL: CPT

## 2018-08-03 PROCEDURE — 85384 FIBRINOGEN ACTIVITY: CPT

## 2018-08-03 PROCEDURE — 85018 HEMOGLOBIN: CPT

## 2018-08-03 PROCEDURE — 85576 BLOOD PLATELET AGGREGATION: CPT

## 2018-08-03 PROCEDURE — 770020 HCHG ROOM/CARE - TELE (206)

## 2018-08-03 PROCEDURE — A9270 NON-COVERED ITEM OR SERVICE: HCPCS | Performed by: HOSPITALIST

## 2018-08-03 PROCEDURE — 74174 CTA ABD&PLVS W/CONTRAST: CPT

## 2018-08-03 PROCEDURE — 96365 THER/PROPH/DIAG IV INF INIT: CPT

## 2018-08-03 PROCEDURE — A9270 NON-COVERED ITEM OR SERVICE: HCPCS | Performed by: INTERNAL MEDICINE

## 2018-08-03 PROCEDURE — 700102 HCHG RX REV CODE 250 W/ 637 OVERRIDE(OP): Performed by: INTERNAL MEDICINE

## 2018-08-03 PROCEDURE — 700102 HCHG RX REV CODE 250 W/ 637 OVERRIDE(OP): Performed by: HOSPITALIST

## 2018-08-03 PROCEDURE — C9113 INJ PANTOPRAZOLE SODIUM, VIA: HCPCS | Performed by: HOSPITALIST

## 2018-08-03 PROCEDURE — 36415 COLL VENOUS BLD VENIPUNCTURE: CPT

## 2018-08-03 PROCEDURE — 84443 ASSAY THYROID STIM HORMONE: CPT

## 2018-08-03 PROCEDURE — 700117 HCHG RX CONTRAST REV CODE 255: Performed by: EMERGENCY MEDICINE

## 2018-08-03 PROCEDURE — 99285 EMERGENCY DEPT VISIT HI MDM: CPT

## 2018-08-03 PROCEDURE — 83735 ASSAY OF MAGNESIUM: CPT

## 2018-08-03 PROCEDURE — 700105 HCHG RX REV CODE 258: Performed by: HOSPITALIST

## 2018-08-03 PROCEDURE — 96366 THER/PROPH/DIAG IV INF ADDON: CPT

## 2018-08-03 PROCEDURE — 85025 COMPLETE CBC W/AUTO DIFF WBC: CPT

## 2018-08-03 PROCEDURE — 700111 HCHG RX REV CODE 636 W/ 250 OVERRIDE (IP): Performed by: HOSPITALIST

## 2018-08-03 PROCEDURE — 85347 COAGULATION TIME ACTIVATED: CPT

## 2018-08-03 PROCEDURE — 99221 1ST HOSP IP/OBS SF/LOW 40: CPT | Performed by: HOSPITALIST

## 2018-08-03 PROCEDURE — 85730 THROMBOPLASTIN TIME PARTIAL: CPT

## 2018-08-03 RX ORDER — SENNOSIDES 8.6 MG
650 CAPSULE ORAL EVERY 6 HOURS PRN
Status: ON HOLD | COMMUNITY
End: 2018-08-05

## 2018-08-03 RX ORDER — SODIUM CHLORIDE 9 MG/ML
INJECTION, SOLUTION INTRAVENOUS CONTINUOUS
Status: DISCONTINUED | OUTPATIENT
Start: 2018-08-03 | End: 2018-08-05 | Stop reason: HOSPADM

## 2018-08-03 RX ORDER — AMOXICILLIN 250 MG
2 CAPSULE ORAL 2 TIMES DAILY
Status: DISCONTINUED | OUTPATIENT
Start: 2018-08-03 | End: 2018-08-05 | Stop reason: HOSPADM

## 2018-08-03 RX ORDER — LEVOTHYROXINE SODIUM 0.1 MG/1
100 TABLET ORAL
Status: DISCONTINUED | OUTPATIENT
Start: 2018-08-04 | End: 2018-08-05 | Stop reason: HOSPADM

## 2018-08-03 RX ORDER — ONDANSETRON 4 MG/1
4 TABLET, ORALLY DISINTEGRATING ORAL EVERY 4 HOURS PRN
Status: DISCONTINUED | OUTPATIENT
Start: 2018-08-03 | End: 2018-08-05 | Stop reason: HOSPADM

## 2018-08-03 RX ORDER — ONDANSETRON 2 MG/ML
4 INJECTION INTRAMUSCULAR; INTRAVENOUS EVERY 4 HOURS PRN
Status: DISCONTINUED | OUTPATIENT
Start: 2018-08-03 | End: 2018-08-05 | Stop reason: HOSPADM

## 2018-08-03 RX ORDER — AMOXICILLIN 500 MG
1200 CAPSULE ORAL DAILY
COMMUNITY
End: 2023-01-18

## 2018-08-03 RX ORDER — LATANOPROST 50 UG/ML
1 SOLUTION/ DROPS OPHTHALMIC NIGHTLY
Status: DISCONTINUED | OUTPATIENT
Start: 2018-08-03 | End: 2018-08-05 | Stop reason: HOSPADM

## 2018-08-03 RX ORDER — LABETALOL HYDROCHLORIDE 5 MG/ML
10 INJECTION, SOLUTION INTRAVENOUS EVERY 4 HOURS PRN
Status: DISCONTINUED | OUTPATIENT
Start: 2018-08-03 | End: 2018-08-05 | Stop reason: HOSPADM

## 2018-08-03 RX ORDER — PEG-3350, SODIUM SULFATE, SODIUM CHLORIDE, POTASSIUM CHLORIDE, SODIUM ASCORBATE AND ASCORBIC ACID 7.5-2.691G
100 KIT ORAL 2 TIMES DAILY
Status: COMPLETED | OUTPATIENT
Start: 2018-08-03 | End: 2018-08-04

## 2018-08-03 RX ORDER — BISACODYL 10 MG
10 SUPPOSITORY, RECTAL RECTAL
Status: DISCONTINUED | OUTPATIENT
Start: 2018-08-03 | End: 2018-08-05 | Stop reason: HOSPADM

## 2018-08-03 RX ORDER — POLYETHYLENE GLYCOL 3350 17 G/17G
1 POWDER, FOR SOLUTION ORAL
Status: DISCONTINUED | OUTPATIENT
Start: 2018-08-03 | End: 2018-08-05 | Stop reason: HOSPADM

## 2018-08-03 RX ADMIN — LATANOPROST 1 DROP: 50 SOLUTION OPHTHALMIC at 22:02

## 2018-08-03 RX ADMIN — SODIUM CHLORIDE 80 MG: 9 INJECTION, SOLUTION INTRAVENOUS at 10:49

## 2018-08-03 RX ADMIN — SODIUM CHLORIDE 8 MG/HR: 9 INJECTION, SOLUTION INTRAVENOUS at 13:41

## 2018-08-03 RX ADMIN — IOHEXOL 100 ML: 350 INJECTION, SOLUTION INTRAVENOUS at 11:00

## 2018-08-03 RX ADMIN — POLYETHYLENE GLYCOL 3350, SODIUM SULFATE, SODIUM CHLORIDE, POTASSIUM CHLORIDE, ASCORBIC ACID, SODIUM ASCORBATE 100 G: KIT at 18:50

## 2018-08-03 RX ADMIN — SODIUM CHLORIDE: 9 INJECTION, SOLUTION INTRAVENOUS at 18:50

## 2018-08-03 RX ADMIN — SODIUM CHLORIDE: 9 INJECTION, SOLUTION INTRAVENOUS at 10:49

## 2018-08-03 ASSESSMENT — ENCOUNTER SYMPTOMS
PSYCHIATRIC NEGATIVE: 1
COUGH: 0
CONSTIPATION: 0
DIZZINESS: 0
HEADACHES: 0
RESPIRATORY NEGATIVE: 1
CONSTITUTIONAL NEGATIVE: 1
LOSS OF CONSCIOUSNESS: 0
DIAPHORESIS: 0
ABDOMINAL PAIN: 0
HEMOPTYSIS: 0
PALPITATIONS: 0
NEUROLOGICAL NEGATIVE: 1
SEIZURES: 0
VOMITING: 0
CHILLS: 0
EYES NEGATIVE: 1
DIARRHEA: 0
DOUBLE VISION: 0
HEARTBURN: 0
MUSCULOSKELETAL NEGATIVE: 1
FEVER: 0
BLOOD IN STOOL: 1
NERVOUS/ANXIOUS: 0
NAUSEA: 0
CARDIOVASCULAR NEGATIVE: 1
FOCAL WEAKNESS: 0
BRUISES/BLEEDS EASILY: 0
WHEEZING: 0

## 2018-08-03 ASSESSMENT — LIFESTYLE VARIABLES
AVERAGE NUMBER OF DAYS PER WEEK YOU HAVE A DRINK CONTAINING ALCOHOL: 4
ON A TYPICAL DAY WHEN YOU DRINK ALCOHOL HOW MANY DRINKS DO YOU HAVE: 2
DO YOU DRINK ALCOHOL: YES
HOW MANY TIMES IN THE PAST YEAR HAVE YOU HAD 5 OR MORE DRINKS IN A DAY: 0
EVER FELT BAD OR GUILTY ABOUT YOUR DRINKING: NO
TOTAL SCORE: 0
HAVE PEOPLE ANNOYED YOU BY CRITICIZING YOUR DRINKING: NO
CONSUMPTION TOTAL: POSITIVE
TOTAL SCORE: 0
HAVE YOU EVER FELT YOU SHOULD CUT DOWN ON YOUR DRINKING: NO
EVER HAD A DRINK FIRST THING IN THE MORNING TO STEADY YOUR NERVES TO GET RID OF A HANGOVER: NO
TOTAL SCORE: 0

## 2018-08-03 ASSESSMENT — PAIN SCALES - GENERAL
PAINLEVEL_OUTOF10: 0

## 2018-08-03 NOTE — H&P
Hospital Medicine History & Physical Note    Date of Service  8/3/2018    Primary Care Physician  Baltazar Rojas M.D.    Consultants  Gastroenterology    Code Status  Full code    Chief Complaint  Blood in the toilet    History of Presenting Illness  73 y.o. female who presented 8/3/2018 with new onset of acute gastrointestinal bleeding. Patient this point will be admitted to the telemetric floor. Under telemetric monitoring will keep her nothing by mouth. Gastroesophageal neurology has been consulted and they will evaluate the patient. Patient will be at this point monitor with serial H&H's. Patient will be placed on IV Protonix. CT of the abdomen is pending. Give fluid resuscitation. Monitor at this point blood pressure and hold antihypertensive management for now.    Review of Systems  Review of Systems   Constitutional: Negative.  Negative for chills, diaphoresis and fever.   HENT: Negative.    Eyes: Negative.  Negative for double vision.   Respiratory: Negative.  Negative for cough, hemoptysis and wheezing.    Cardiovascular: Negative.  Negative for chest pain, palpitations and leg swelling.   Gastrointestinal: Positive for blood in stool. Negative for abdominal pain, constipation, diarrhea, heartburn, nausea and vomiting.   Genitourinary: Negative.  Negative for frequency, hematuria and urgency.   Musculoskeletal: Negative.  Negative for joint pain.   Skin: Negative.  Negative for itching and rash.   Neurological: Negative.  Negative for dizziness, focal weakness, seizures, loss of consciousness and headaches.   Endo/Heme/Allergies: Negative.  Does not bruise/bleed easily.   Psychiatric/Behavioral: Negative.  Negative for suicidal ideas. The patient is not nervous/anxious.    All other systems reviewed and are negative.      Past Medical History   has a past medical history of Anxiety; Arthritis; Cramp of limb (3/16/2012); Diastolic dysfunction (3/16/2012); DVT (deep venous thrombosis) (HCC); Edema  (3/16/2012); GERD (gastroesophageal reflux disease); Glaucoma; Heart burn; Hiatus hernia syndrome; Hypercholesteremia; Hyperlipidemia (3/16/2012); Hypertension; Hypothyroid; Indigestion; Osteopenia; Palpitations (3/16/2012); and Polio.    Surgical History   has a past surgical history that includes hysterectomy laparoscopy (1984); other (1990); tonsillectomy (1965); hip arthroplasty total (11/14/2012); foot surgery (1993); shoulder decompression arthroscopic (Right, 5/18/2016); and shoulder arthroscopy w/ rotator cuff repair (Right, 5/18/2016).     Family History  family history is not on file.     Social History   reports that she quit smoking about 29 years ago. Her smoking use included Cigarettes. She has a 27.00 pack-year smoking history. She has never used smokeless tobacco. She reports that she drinks alcohol. She reports that she does not use drugs.    Allergies  No Known Allergies    Medications  Prior to Admission Medications   Prescriptions Last Dose Informant Patient Reported? Taking?   B Complex Vitamins (VITAMIN B COMPLEX PO) 8/2/2018 at Unknown time  Yes No   Sig: Take 1 Tab by mouth every day.   Calcium-Magnesium-Vitamin D (CITRACAL CALCIUM+D PO) 8/2/2018 at Unknown time  Yes No   Sig: Take 1 Tab by mouth every day.   Glucosamine-Chondroitin (COSAMIN DS PO) 8/2/2018 at Unknown time  Yes No   Sig: Take 1 Tab by mouth every day.   LUTEIN 8/2/2018 at Unknown time  Yes No   Sig: Take 1 Tab by mouth every day.   Lactase (LACTAID PO) 8/2/2018 at Unknown time  Yes Yes   Sig: Take 1 Tab by mouth 2 times daily, before breakfast and dinner.   Multiple Vitamins-Minerals (MULTI COMPLETE PO) 8/2/2018 at Unknown time  Yes No   Sig: Take 1 Tab by mouth every day.   Omega-3 Fatty Acids (FISH OIL) 1200 MG Cap 8/2/2018 at Unknown time  Yes Yes   Sig: Take 1,200 mg by mouth every day.   acetaminophen (TYLENOL 8 HOUR) 650 MG CR tablet unknown at Unknown time  Yes Yes   Sig: Take 650 mg by mouth every 6 hours as needed.    aspirin EC (ECOTRIN) 81 MG TBEC 8/2/2018 at Unknown time  Yes No   Sig: Take 81 mg by mouth every day.   atorvastatin (LIPITOR) 20 MG Tab 8/2/2018 at Unknown time  No No   Sig: Take 1 Tab by mouth every evening.   carvedilol (COREG) 6.25 MG Tab 8/3/2018 at 0530  Yes No   Sig: Take 6.25 mg by mouth 2 times a day, with meals.   lansoprazole (PREVACID) 30 MG CAPSULE DELAYED RELEASE 8/3/2018 at 0530  Yes No   Sig: Take 30 mg by mouth 2 times daily, before breakfast and dinner.   latanoprost (XALATAN) 0.005 % SOLN 8/2/2018 at unknown  Yes No   Sig: Place 1 Drop in both eyes every evening.     levothyroxine (SYNTHROID) 100 MCG TABS 8/3/2018 at 0530 Patient Yes No   Sig: Take 100 mcg by mouth See Admin Instructions. 6 days a week   lisinopril (PRINIVIL) 10 MG TABS 8/3/2018 at 0530 Patient Yes No   Sig: Take 10 mg by mouth every morning.   loratadine (ALAVERT) 10 MG TABS 8/2/2018 at [place]knwon Patient Yes No   Sig: Take 10 mg by mouth every day.   raNITidine (ZANTAC) 150 MG Tab 8/2/2018 at Unknown time  Yes No   Sig: Take 150 mg by mouth 2 times a day.      Facility-Administered Medications: None       Physical Exam  Blood Pressure : 137/52   Temperature: 36.7 °C (98.1 °F)   Pulse: 75   Respiration: (!) 24   Pulse Oximetry: (!) 86 %     Physical Exam   Constitutional: She is oriented to person, place, and time. She appears well-developed and well-nourished.   HENT:   Head: Normocephalic and atraumatic.   Right Ear: External ear normal.   Left Ear: External ear normal.   Nose: Nose normal.   Mouth/Throat: Oropharynx is clear and moist.   Eyes: Pupils are equal, round, and reactive to light. Conjunctivae and EOM are normal.   Neck: Normal range of motion. Neck supple. No JVD present. No thyromegaly present.   Cardiovascular: Normal rate and regular rhythm.    No murmur heard.  Pulmonary/Chest: She has no wheezes. She has no rales. She exhibits no tenderness.   Abdominal: She exhibits no distension and no mass. There is no  tenderness. There is no rebound and no guarding.   Genitourinary: Rectal exam shows guaiac positive stool.   Musculoskeletal: Normal range of motion. She exhibits no edema or tenderness.   Lymphadenopathy:     She has no cervical adenopathy.   Neurological: She is alert and oriented to person, place, and time. She has normal reflexes. No cranial nerve deficit.   Skin: Skin is warm and dry. No rash noted. No erythema.   Psychiatric: She has a normal mood and affect.   Nursing note and vitals reviewed.      Laboratory:  Recent Labs      08/03/18   0730   WBC  7.9   RBC  4.53   HEMOGLOBIN  14.3   HEMATOCRIT  41.9   MCV  92.5   MCH  31.6   MCHC  34.1   RDW  46.5   PLATELETCT  191   MPV  8.4*     Recent Labs      08/03/18   0730   SODIUM  140   POTASSIUM  4.0   CHLORIDE  107   CO2  22   GLUCOSE  116*   BUN  25*   CREATININE  0.57   CALCIUM  9.4     Recent Labs      08/03/18   0730   ALTSGPT  36   ASTSGOT  26   ALKPHOSPHAT  67   TBILIRUBIN  0.6   GLUCOSE  116*     Recent Labs      08/03/18   0730   APTT  24.7   INR  1.14*             No results found for: TROPONINI    Urinalysis:    No results found     Imaging:  CTA ABDOMEN PELVIS W & W/O POST PROCESS   Final Result      No evidence of active GI arterial bleeding.   Diverticula colon. No evidence diverticulitis. No free fluid.   Hepatic steatosis.            Assessment/Plan:  I anticipate this patient will require at least two midnights for appropriate medical management, necessitating inpatient admission.    Gastrointestinal bleeding   Assessment & Plan    Patient reports that she had one onset of shanthi blood in the toilet last night. The patient then had a 2nd episode of shanthi blood in the toilet today in the morning and then again 2 more times. She says total of 4 time she's had shanthi blood in the toilet. The patient does comes in at this point she is not anemic we will watch her H&H very carefully. If she does drop below 7 or 21 or his unstable we will transfuse her.  Currently she does not have acute blood loss anemia yet. At this point we will evaluate her with CT of the abdomen. GI has been consulted. I'm going to put her on IV Protonix. Patient will be carefully evaluated for bleeding source. Patient's INR will be monitored. We will obtain a TEG level. Keep nothing by mouth for now, hold medications that are not essential, continue with fluid resuscitation.        Diastolic dysfunction- (present on admission)   Assessment & Plan    Monitored this point blood pressure and fluid status. Continue with beta blocker only once she is stable from her GI bleed point.        Benign essential HTN- (present on admission)   Assessment & Plan    Continued blood pressure management optimization, keeps her blood pressure under 140 diastolic under 90. For right now antihypertensive management will be held as the patient with a GI bleed can become hypotensive very quickly.        Mixed hyperlipidemia- (present on admission)   Assessment & Plan    For right now holding statin, she is nothing by mouth, once she is stable she can resume a low-fat low-cholesterol diet.  Lab Results   Component Value Date/Time    CHOLSTRLTOT 147 07/26/2018 08:11 AM    LDL 55 07/26/2018 08:11 AM    HDL 50 07/26/2018 08:11 AM    TRIGLYCERIDE 210 (H) 07/26/2018 08:11 AM                  VTE prophylaxis: None with ongoing GI bleed.

## 2018-08-03 NOTE — ASSESSMENT & PLAN NOTE
H/h decreased since admit but now in stable range  Colonoscopy this afternoon  GI following  Continue to follow  Continue ppi

## 2018-08-03 NOTE — PROGRESS NOTES
Patient arrived to floor. Tele box on and monitor room notified. MR:  VSS. Patient updated on POC for the day. No further needs at this time

## 2018-08-03 NOTE — ED NOTES
Pt sleeping comfortably in bed, no s/s of distress noted. Breathing even and unlabored. Will continue to monitor.

## 2018-08-03 NOTE — ED PROVIDER NOTES
"ED Provider Note    CHIEF COMPLAINT  Chief Complaint   Patient presents with   • Bloody Stools       HPI  Maryam Gomez is a 73 y.o. female who presents with bloody stools.  Onset last evening, worse this morning.  She takes baby aspirin.  She denies epigastric pain.  No vomiting.  No dizziness.  She denies shortness of breath with exertion.  No skin color change.  Blood in her stool is described as bright red.  No dysuria.  She denies trauma.  Patient states the bloody stools have not been a problem in the past.  Patient had DVT \"years ago\", however does not take blood thinners for this she states    REVIEW OF SYSTEMS  Constitutional: No fever or dizziness  Respiratory: No shortness of breath  Cardiac: No chest pain or syncope  Gastrointestinal: Rectal bleeding, no abdominal pain  Musculoskeletal: No acute back pain  Neurologic: No headache  Genitourinary: No dysuria       All other systems are negative.     PAST MEDICAL HISTORY  Past Medical History:   Diagnosis Date   • Anxiety    • Arthritis     osteoarthritis   • Cramp of limb 3/16/2012   • Diastolic dysfunction 3/16/2012   • DVT (deep venous thrombosis) (HCC)     after a fall with 6mo coumadin   • Edema 3/16/2012   • GERD (gastroesophageal reflux disease)    • Glaucoma    • Heart burn     right shoulder   • Hiatus hernia syndrome    • Hypercholesteremia    • Hyperlipidemia 3/16/2012   • Hypertension    • Hypothyroid    • Indigestion    • Osteopenia    • Palpitations 3/16/2012   • Polio        FAMILY HISTORY  Family History   Problem Relation Age of Onset   • Heart Attack Neg Hx    • Heart Disease Neg Hx    • Heart Failure Neg Hx        SOCIAL HISTORY  Social History     Social History   • Marital status:      Spouse name: N/A   • Number of children: N/A   • Years of education: N/A     Social History Main Topics   • Smoking status: Former Smoker     Packs/day: 1.00     Years: 27.00     Types: Cigarettes     Quit date: 1/1/1989   • Smokeless " "tobacco: Never Used      Comment: Quit 1989   • Alcohol use Yes      Comment: 3 per day   • Drug use: No   • Sexual activity: Not on file     Other Topics Concern   • Not on file     Social History Narrative   • No narrative on file       SURGICAL HISTORY  Past Surgical History:   Procedure Laterality Date   • SHOULDER DECOMPRESSION ARTHROSCOPIC Right 5/18/2016    Procedure: SHOULDER DECOMPRESSION ARTHROSCOPIC - SUBACROMIAL;  Surgeon: Joseph Ricketts M.D.;  Location: SURGERY [facility];  Service:    • SHOULDER ARTHROSCOPY W/ ROTATOR CUFF REPAIR Right 5/18/2016    Procedure: SHOULDER ARTHROSCOPY W/ ROTATOR CUFF REPAIR ;  Surgeon: Joseph Ricketts M.D.;  Location: SURGERY [facility];  Service:    • HIP ARTHROPLASTY TOTAL  11/14/2012    Performed by Jsoeph Ricketts M.D. at SURGERY [facility]   • FOOT SURGERY  1993   • OTHER  1990    nose   • HYSTERECTOMY LAPAROSCOPY  1984   • TONSILLECTOMY  1965       CURRENT MEDICATIONS  Home Medications    **Home medications have not yet been reviewed for this encounter**         ALLERGIES  No Known Allergies    PHYSICAL EXAM  VITAL SIGNS: /52   Pulse 72   Temp 36.7 °C (98.1 °F)   Resp 16   Ht 1.549 m (5' 1\")   Wt 80.7 kg (178 lb)   SpO2 96%   BMI 33.63 kg/m²   Constitutional: Well-nourished  ENT: Nares clear, mucous membranes moist.  Eyes:  Conjunctiva normal, No discharge.    Lymphatic: No adenopathy.   Cardiovascular: Normal heart rate, Normal rhythm.   Pulmonary: No wheezing, no rales  Gastrointestinal: Soft, nontender, no guarding.  With female nurse present, rectal exam performed, no palpable mass, bright red blood present in the rectum  Skin: Warm, Dry, No jaundice.  No petechiae  Musculoskeletal:  No CVA tenderness.   Neurologic: Alert & oriented x 3, Normal motor and sensory function, No focal deficits noted.   Psychiatric:Normal affect, Normal mood.    RADIOLOGY/PROCEDURES/Labs  Results for orders placed or performed during the " hospital encounter of 08/03/18   CBC WITH DIFFERENTIAL   Result Value Ref Range    WBC 7.9 4.8 - 10.8 K/uL    RBC 4.53 4.20 - 5.40 M/uL    Hemoglobin 14.3 12.0 - 16.0 g/dL    Hematocrit 41.9 37.0 - 47.0 %    MCV 92.5 81.4 - 97.8 fL    MCH 31.6 27.0 - 33.0 pg    MCHC 34.1 33.6 - 35.0 g/dL    RDW 46.5 35.9 - 50.0 fL    Platelet Count 191 164 - 446 K/uL    MPV 8.4 (L) 9.0 - 12.9 fL    Neutrophils-Polys 65.70 44.00 - 72.00 %    Lymphocytes 22.80 22.00 - 41.00 %    Monocytes 6.30 0.00 - 13.40 %    Eosinophils 4.10 0.00 - 6.90 %    Basophils 0.60 0.00 - 1.80 %    Immature Granulocytes 0.50 0.00 - 0.90 %    Nucleated RBC 0.00 /100 WBC    Neutrophils (Absolute) 5.17 2.00 - 7.15 K/uL    Lymphs (Absolute) 1.80 1.00 - 4.80 K/uL    Monos (Absolute) 0.50 0.00 - 0.85 K/uL    Eos (Absolute) 0.32 0.00 - 0.51 K/uL    Baso (Absolute) 0.05 0.00 - 0.12 K/uL    Immature Granulocytes (abs) 0.04 0.00 - 0.11 K/uL    NRBC (Absolute) 0.00 K/uL   COMP METABOLIC PANEL   Result Value Ref Range    Sodium 140 135 - 145 mmol/L    Potassium 4.0 3.6 - 5.5 mmol/L    Chloride 107 96 - 112 mmol/L    Co2 22 20 - 33 mmol/L    Anion Gap 11.0 0.0 - 11.9    Glucose 116 (H) 65 - 99 mg/dL    Bun 25 (H) 8 - 22 mg/dL    Creatinine 0.57 0.50 - 1.40 mg/dL    Calcium 9.4 8.5 - 10.5 mg/dL    AST(SGOT) 26 12 - 45 U/L    ALT(SGPT) 36 2 - 50 U/L    Alkaline Phosphatase 67 30 - 99 U/L    Total Bilirubin 0.6 0.1 - 1.5 mg/dL    Albumin 4.8 3.2 - 4.9 g/dL    Total Protein 7.0 6.0 - 8.2 g/dL    Globulin 2.2 1.9 - 3.5 g/dL    A-G Ratio 2.2 g/dL   PROTHROMBIN TIME   Result Value Ref Range    PT 14.3 12.0 - 14.6 sec    INR 1.14 (H) 0.87 - 1.13   APTT   Result Value Ref Range    APTT 24.7 24.7 - 36.0 sec   ESTIMATED GFR   Result Value Ref Range    GFR If African American >60 >60 mL/min/1.73 m 2    GFR If Non African American >60 >60 mL/min/1.73 m 2     CTA ABDOMEN PELVIS W & W/O POST PROCESS    (Results Pending)           COURSE & MEDICAL DECISION MAKING  Pertinent Labs &  Imaging studies reviewed. (See chart for details)  Case was discussed with gastroenterology, Dr. Carter who has requested CT scan to rule out ischemic colitis.  The CT scan is pending.  Patient admitted to the hospitalist, telemetry.  She has remained hemodynamically stable here however does have evidence of ongoing bleeding with fresh blood on rectal exam.  Patient's hemoglobin is normal at 14.3.    FINAL IMPRESSION  1. Lower GI bleed            Electronically signed by: Baltazar Pope, 8/3/2018 8:11 AM

## 2018-08-03 NOTE — ED TRIAGE NOTES
"Patient reports having small bright red bloody stools \"like clots, not really any actual stool in them\" since yesterday.  "

## 2018-08-03 NOTE — ASSESSMENT & PLAN NOTE
For right now holding statin, she is nothing by mouth, once she is stable she can resume a low-fat low-cholesterol diet.  Lab Results   Component Value Date/Time    CHOLSTRLTOT 147 07/26/2018 08:11 AM    LDL 55 07/26/2018 08:11 AM    HDL 50 07/26/2018 08:11 AM    TRIGLYCERIDE 210 (H) 07/26/2018 08:11 AM

## 2018-08-03 NOTE — ED NOTES
Report given at bedside to Chad MCKEON, patient to floor with RN and telemonitoring by bulmaro. Receiving RN may call Kailyn RN in blue pod with additional questions.

## 2018-08-03 NOTE — CONSULTS
"Gastroenterology Consult Note:    ErikEstela Emma  Date & Time note created:    8/3/2018   4:17 PM     Patient ID:  Name:             Maryam Gomez  YOB: 1945  Age:                 73 y.o.  female  MRN:               [id number]    Referring MD:  Dr. Kim                                                             Chief Complaint(s):      Hematochezia    History of Present Illness:    This is a very pleasant 73 y.o. female with the past medical history as listed below.     She has h/o GERD on PPI, heartburn, H pylori gastritis, diverticulosis, and colon polyps. Last EGD in 2012 showed hiatal hernia, and COL showed a hyperplastic polyp. She has been doing ok until 8/2/18 evening, when she started to pass small blood clot via the rectum. She had a few more times of small amount blood clots passage. She thus decided to come to ER. She has been having some problem with constipation. On average, the patient has 1-2 BMs a day, mostly type 3 on the [place] stool chart, sometimes difficult, on the toilet 2-3 mins, with effort sometimes.     The patient does take NSAIDs (ASA 81 mg daily).  She denies epigastric pain, abd pain, fever.  No vomiting.  No dizziness.  Patient had DVT \"years ago\", however does not take blood thinners for this she states    Otherwise the patient is doing fine without complaints of fever/chills/weight loss/appetite change/dysphagia/odynophagia/heartburn/nausea/vomiting/bloating/diarrhea/melena or abdominal pain.    Review of Systems:      Constitutional: Denies fevers, weight changes  Eyes: Denies changes in vision, jaundice  Ears/Nose/Throat/Mouth: Denies nasal congestion or sore throat   Cardiovascular: Denies chest pain or palpitations   Respiratory: Denies shortness of breath, denies cough  Gastrointestinal/Hepatic: Denies abdominal pain, nausea, vomiting, diarrhea; pos constipation or GI bleeding  Genitourinary: Denies dysuria or frequency  Musculoskeletal/Rheum: Denies  " joint pain and swelling, edema  Skin: Denies rash  Neurological: Denies headache, confusion, memory loss or focal weakness/parasthesias  Psychiatric: denies mood disorder   Endocrine: Ivana thyroid problems  Heme/Oncology/Lymph Nodes: Denies enlarged lymph nodes, denies brusing or known bleeding disorder  All other systems were reviewed and are negative (AMA/CMS criteria)              Past Medical History:   Past Medical History:   Diagnosis Date   • Anxiety    • Arthritis     osteoarthritis   • Cramp of limb 3/16/2012   • Diastolic dysfunction 3/16/2012   • DVT (deep venous thrombosis) (HCC)     after a fall with 6mo coumadin   • Edema 3/16/2012   • GERD (gastroesophageal reflux disease)    • Glaucoma    • Heart burn     right shoulder   • Hiatus hernia syndrome    • Hypercholesteremia    • Hyperlipidemia 3/16/2012   • Hypertension    • Hypothyroid    • Indigestion    • Osteopenia    • Palpitations 3/16/2012   • Polio      Active Hospital Problems    Diagnosis   • Gastrointestinal bleeding [K92.2]     Priority: High   • Diastolic dysfunction [I51.9]     Priority: Medium   • Benign essential HTN [I10]     Priority: Low   • Mixed hyperlipidemia [E78.2]     Priority: Low       Past Surgical History:  Past Surgical History:   Procedure Laterality Date   • SHOULDER DECOMPRESSION ARTHROSCOPIC Right 5/18/2016    Procedure: SHOULDER DECOMPRESSION ARTHROSCOPIC - SUBACROMIAL;  Surgeon: Joseph Ricketts M.D.;  Location: [facility];  Service:    • SHOULDER ARTHROSCOPY W/ ROTATOR CUFF REPAIR Right 5/18/2016    Procedure: SHOULDER ARTHROSCOPY W/ ROTATOR CUFF REPAIR ;  Surgeon: Joseph Ricketts M.D.;  Location: [facility];  Service:    • HIP ARTHROPLASTY TOTAL  11/14/2012    Performed by Joseph [facility]etts M.D. at [facility]   • FOOT SURGERY  1993   • OTHER  1990    nose   • HYSTERECTOMY LAPAROSCOPY  1984   • TONSILLECTOMY  1965       Hospital Medications:  Current  Facility-Administered Medications   Medication Dose Frequency Provider Last Rate Last Dose   • latanoprost (XALATAN) 0.005 % ophthalmic solution 1 Drop  1 Drop Nightly Argelia Kim M.D.       • [START ON 8/4/2018] levothyroxine (SYNTHROID) tablet 100 mcg  100 mcg Once per day on Mon Tue Wed Thu Fri Sat Argelia Kim M.D.       • senna-docusate (PERICOLACE or SENOKOT S) 8.6-50 MG per tablet 2 Tab  2 Tab BID Argelia Kim M.D.        And   • polyethylene glycol/lytes (MIRALAX) PACKET 1 Packet  1 Packet QDAY PRN Argelia Kim M.D.        And   • magnesium hydroxide (MILK OF MAGNESIA) suspension 30 mL  30 mL QDAY PRN Argelia Kim M.D.        And   • bisacodyl (DULCOLAX) suppository 10 mg  10 mg QDAY PRN Argelia Kim M.D.       • NS infusion   Continuous Argelia Kim M.D. 100 mL/hr at 08/03/18 1049     • labetalol (NORMODYNE,TRANDATE) injection 10 mg  10 mg Q4HRS PRN Argelia Kim M.D.       • ondansetron (ZOFRAN) syringe/vial injection 4 mg  4 mg Q4HRS PRN Argelia Kim M.D.       • ondansetron (ZOFRAN ODT) dispertab 4 mg  4 mg Q4HRS PRN Argelia Kim M.D.       • pantoprazole (PROTONIX) 80 mg in  mL Infusion  8 mg/hr Continuous Argelia Kim M.D. 25 mL/hr at 08/03/18 1341 8 mg/hr at 08/03/18 1341     Last reviewed on 8/3/2018  9:17 AM by Argelia Kim M.D.    Current Outpatient Medications:  Prescriptions Prior to Admission   Medication Sig Dispense Refill Last Dose   • Lactase (LACTAID PO) Take 1 Tab by mouth 2 times daily, before breakfast and dinner.   8/2/2018 at Unknown time   • Omega-3 Fatty Acids (FISH OIL) 1200 MG Cap Take 1,200 mg by mouth every day.   8/2/2018 at Unknown time   • acetaminophen (TYLENOL 8 HOUR) 650 MG CR tablet Take 650 mg by mouth every 6 hours as needed.   unknown at Unknown time   • raNITidine (ZANTAC) 150 MG Tab Take 150 mg by mouth 2 times a day.   8/2/2018 at Unknown time   • carvedilol (COREG) 6.25 MG Tab Take 6.25 mg by mouth 2 times a day, with meals.   8/3/2018  at 0530   • atorvastatin (LIPITOR) 20 MG Tab Take 1 Tab by mouth every evening. 30 Tab 11 8/2/2018 at Unknown time   • lansoprazole (PREVACID) 30 MG CAPSULE DELAYED RELEASE Take 30 mg by mouth 2 times daily, before breakfast and dinner.   8/3/2018 at 0530   • aspirin EC (ECOTRIN) 81 MG TBEC Take 81 mg by mouth every day.   8/2/2018 at Unknown time   • Multiple Vitamins-Minerals (MULTI COMPLETE PO) Take 1 Tab by mouth every day.   8/2/2018 at Unknown time   • Calcium-Magnesium-Vitamin D (CITRACAL CALCIUM+D PO) Take 1 Tab by mouth every day.   8/2/2018 at Unknown time   • Glucosamine-Chondroitin (COSAMIN DS PO) Take 1 Tab by mouth every day.   8/2/2018 at Unknown time   • B Complex Vitamins (VITAMIN B COMPLEX PO) Take 1 Tab by mouth every day.   8/2/2018 at Unknown time   • LUTEIN Take 1 Tab by mouth every day.   8/2/2018 at Unknown time   • latanoprost (XALATAN) 0.005 % SOLN Place 1 Drop in both eyes every evening.     8/2/2018 at unknown   • levothyroxine (SYNTHROID) 100 MCG TABS Take 100 mcg by mouth See Admin Instructions. 6 days a week   8/3/2018 at 0530   • lisinopril (PRINIVIL) 10 MG TABS Take 10 mg by mouth every morning.   8/3/2018 at 0530   • loratadine (ALAVERT) 10 MG TABS Take 10 mg by mouth every day.   8/2/2018 at unknwon       Medication Allergy:  No Known Allergies    Family History:  Family History   Problem Relation Age of Onset   • Heart Attack Neg Hx    • Heart Disease Neg Hx    • Heart Failure Neg Hx        Social History:  Social History     Social History   • Marital status:      Spouse name: N/A   • Number of children: N/A   • Years of education: N/A     Occupational History   • Not on file.     Social History Main Topics   • Smoking status: Former Smoker     Packs/day: 1.00     Years: 27.00     Types: Cigarettes     Quit date: 1/1/1989   • Smokeless tobacco: Never Used      Comment: Quit 1989   • Alcohol use Yes      Comment: 3 per day   • Drug use: No   • Sexual activity: Not on file  "    Other Topics Concern   • Not on file     Social History Narrative   • No narrative on file       Physical Exam:  Weight/BMI: Body mass index is 33.63 kg/m².  Blood pressure 137/52, pulse 70, temperature 36.7 °C (98.1 °F), resp. rate 18, height 1.549 m (5' 1\"), weight 80.7 kg (178 lb), SpO2 96 %.  Vitals:    08/03/18 1300 08/03/18 1400 08/03/18 1431 08/03/18 1501   BP:       Pulse: 66 70 67 70   Resp: 20 19 15 18   Temp:       SpO2: 94% 97% 95% 96%   Weight:       Height:         Oxygen Therapy:  Pulse Oximetry: 96 %, O2 Delivery: None (Room Air)  No intake or output data in the 24 hours ending 08/03/18 1617    Constitutional:   Well developed, well nourished, no acute distress  HEENT:  Normocephalic, Atraumatic, Conjunctiva not pale, Sclera not icteric, Oropharynx moist mucous membranes, No oral exudates, Nose normal.  No thyromegaly.  Neck:  Normal range of motion, No cervical tenderness,  no JVD.  Chest/Lungs:  Symmetric expansion, no spider angioma, breath sounds clear to auscultation bilaterally,  no crackles, no wheezing.   Cardiovascular:  Normal heart rate, Normal rhythm, No murmurs, No rubs, No gallops.    Abdomen: Bowel sounds normal, Soft, No tenderness, No guarding, No rebound, No masses, No hepatosplenomegaly. BENY per ER: red blood  Extremities: No cyanosis/clubbing/edema/palmar erythema/flapping tremor  Skin: Warm, Dry, No erythema, No rash, no induration.    MDM (Data Review):     Records reviewed and summarized in current documentation    Lab Data Review:  Recent Results (from the past 24 hour(s))   CBC WITH DIFFERENTIAL    Collection Time: 08/03/18  7:30 AM   Result Value Ref Range    WBC 7.9 4.8 - 10.8 K/uL    RBC 4.53 4.20 - 5.40 M/uL    Hemoglobin 14.3 12.0 - 16.0 g/dL    Hematocrit 41.9 37.0 - 47.0 %    MCV 92.5 81.4 - 97.8 fL    MCH 31.6 27.0 - 33.0 pg    MCHC 34.1 33.6 - 35.0 g/dL    RDW 46.5 35.9 - 50.0 fL    Platelet Count 191 164 - 446 K/uL    MPV 8.4 (L) 9.0 - 12.9 fL    " Neutrophils-Polys 65.70 44.00 - 72.00 %    Lymphocytes 22.80 22.00 - 41.00 %    Monocytes 6.30 0.00 - 13.40 %    Eosinophils 4.10 0.00 - 6.90 %    Basophils 0.60 0.00 - 1.80 %    Immature Granulocytes 0.50 0.00 - 0.90 %    Nucleated RBC 0.00 /100 WBC    Neutrophils (Absolute) 5.17 2.00 - 7.15 K/uL    Lymphs (Absolute) 1.80 1.00 - 4.80 K/uL    Monos (Absolute) 0.50 0.00 - 0.85 K/uL    Eos (Absolute) 0.32 0.00 - 0.51 K/uL    Baso (Absolute) 0.05 0.00 - 0.12 K/uL    Immature Granulocytes (abs) 0.04 0.00 - 0.11 K/uL    NRBC (Absolute) 0.00 K/uL   COMP METABOLIC PANEL    Collection Time: 08/03/18  7:30 AM   Result Value Ref Range    Sodium 140 135 - 145 mmol/L    Potassium 4.0 3.6 - 5.5 mmol/L    Chloride 107 96 - 112 mmol/L    Co2 22 20 - 33 mmol/L    Anion Gap 11.0 0.0 - 11.9    Glucose 116 (H) 65 - 99 mg/dL    Bun 25 (H) 8 - 22 mg/dL    Creatinine 0.57 0.50 - 1.40 mg/dL    Calcium 9.4 8.5 - 10.5 mg/dL    AST(SGOT) 26 12 - 45 U/L    ALT(SGPT) 36 2 - 50 U/L    Alkaline Phosphatase 67 30 - 99 U/L    Total Bilirubin 0.6 0.1 - 1.5 mg/dL    Albumin 4.8 3.2 - 4.9 g/dL    Total Protein 7.0 6.0 - 8.2 g/dL    Globulin 2.2 1.9 - 3.5 g/dL    A-G Ratio 2.2 g/dL   PROTHROMBIN TIME    Collection Time: 08/03/18  7:30 AM   Result Value Ref Range    PT 14.3 12.0 - 14.6 sec    INR 1.14 (H) 0.87 - 1.13   APTT    Collection Time: 08/03/18  7:30 AM   Result Value Ref Range    APTT 24.7 24.7 - 36.0 sec   ESTIMATED GFR    Collection Time: 08/03/18  7:30 AM   Result Value Ref Range    GFR If African American >60 >60 mL/min/1.73 m 2    GFR If Non African American >60 >60 mL/min/1.73 m 2   TSH (Thyroid Stimulating Hormone)    Collection Time: 08/03/18  7:30 AM   Result Value Ref Range    TSH 0.350 (L) 0.380 - 5.330 uIU/mL   MAGNESIUM    Collection Time: 08/03/18  7:30 AM   Result Value Ref Range    Magnesium 1.9 1.5 - 2.5 mg/dL       MDM (Assessment and Plan):     Active Hospital Problems    Diagnosis   • Gastrointestinal bleeding [K92.2]      Priority: High   • Diastolic dysfunction [I51.9]     Priority: Medium   • Benign essential HTN [I10]     Priority: Low   • Mixed hyperlipidemia [E78.2]     Priority: Low       Imaging/Procedures Review:    8/3/18 CT:  No evidence of active GI arterial bleeding.  Diverticula colon. No evidence diverticulitis. No free fluid.  Hepatic steatosis.    Assessment  1. BRBPR, suspect defecation trauma, can't r/o diverticular bleeding or neoplasm, or AVM  2. GERD  3. Hypertension  4. Hyperlipidemia    Plan  1. CL liq diet no red  2. NPO after 3-4 AM  3. Colon prep today  4. Diagnostic colonoscopy tomorrow 2 PM with anesthesia, per pt she would like to have propofol  5. Monitor H/H and vitals.     Risks, benefits, and alternatives were discussed with patient. Consenting persons were given an opportunity to ask questions and discuss other options. Risks including but not limited to failed or incomplete endoscopy, ineffective therapy, perforation, infection, bleeding, missed lesion(s), cardiac and/or pulmonary event, aspiration, stroke, possible need for surgery, hospitalization possibly prolonged, discomfort, unsuccessful and/or incomplete procedure, possible need for repeat procedures and/or additional testings, damage to adjacent organs and/or vascular structures, medication reaction, disability, death, and other adverse events possibly life-threatening. Discussion was undertaken with Layman's terms. Consenting persons stated understanding and acceptance of these risks, and wished to proceed. Consent was given in clear state of mind. All questions were answered.    Thank you very much for allowing me to participate in the care of your patient.  Please feel free to contact me anytime at [phone].     Lucy Carter M.D.    Core Quality Measures   Reviewed items::  Labs, Medications and Radiology reports reviewed

## 2018-08-04 LAB
CHOLEST SERPL-MCNC: 146 MG/DL (ref 100–199)
EKG IMPRESSION: NORMAL
HDLC SERPL-MCNC: 38 MG/DL
HGB BLD-MCNC: 12.6 G/DL (ref 12–16)
HGB BLD-MCNC: 12.7 G/DL (ref 12–16)
HGB BLD-MCNC: 13 G/DL (ref 12–16)
LDLC SERPL CALC-MCNC: 51 MG/DL
TRIGL SERPL-MCNC: 284 MG/DL (ref 0–149)

## 2018-08-04 PROCEDURE — 160204 HCHG ENDO MINUTES - 1ST 30 MINS LEVEL 5: Performed by: INTERNAL MEDICINE

## 2018-08-04 PROCEDURE — A9270 NON-COVERED ITEM OR SERVICE: HCPCS | Performed by: HOSPITALIST

## 2018-08-04 PROCEDURE — 0DBN8ZX EXCISION OF SIGMOID COLON, VIA NATURAL OR ARTIFICIAL OPENING ENDOSCOPIC, DIAGNOSTIC: ICD-10-PCS | Performed by: INTERNAL MEDICINE

## 2018-08-04 PROCEDURE — 160209 HCHG ENDO MINUTES - EA ADDL 1 MIN LEVEL 5: Performed by: INTERNAL MEDICINE

## 2018-08-04 PROCEDURE — 36415 COLL VENOUS BLD VENIPUNCTURE: CPT

## 2018-08-04 PROCEDURE — 99232 SBSQ HOSP IP/OBS MODERATE 35: CPT | Performed by: HOSPITALIST

## 2018-08-04 PROCEDURE — 93005 ELECTROCARDIOGRAM TRACING: CPT | Performed by: INTERNAL MEDICINE

## 2018-08-04 PROCEDURE — 700105 HCHG RX REV CODE 258: Performed by: HOSPITALIST

## 2018-08-04 PROCEDURE — 160048 HCHG OR STATISTICAL LEVEL 1-5: Performed by: INTERNAL MEDICINE

## 2018-08-04 PROCEDURE — 160036 HCHG PACU - EA ADDL 30 MINS PHASE I: Performed by: INTERNAL MEDICINE

## 2018-08-04 PROCEDURE — 700102 HCHG RX REV CODE 250 W/ 637 OVERRIDE(OP): Performed by: HOSPITALIST

## 2018-08-04 PROCEDURE — 700111 HCHG RX REV CODE 636 W/ 250 OVERRIDE (IP)

## 2018-08-04 PROCEDURE — 93010 ELECTROCARDIOGRAM REPORT: CPT | Performed by: INTERNAL MEDICINE

## 2018-08-04 PROCEDURE — 700102 HCHG RX REV CODE 250 W/ 637 OVERRIDE(OP): Performed by: INTERNAL MEDICINE

## 2018-08-04 PROCEDURE — 160035 HCHG PACU - 1ST 60 MINS PHASE I: Performed by: INTERNAL MEDICINE

## 2018-08-04 PROCEDURE — 88305 TISSUE EXAM BY PATHOLOGIST: CPT

## 2018-08-04 PROCEDURE — 80061 LIPID PANEL: CPT

## 2018-08-04 PROCEDURE — 160009 HCHG ANES TIME/MIN: Performed by: INTERNAL MEDICINE

## 2018-08-04 PROCEDURE — 770020 HCHG ROOM/CARE - TELE (206)

## 2018-08-04 PROCEDURE — 85018 HEMOGLOBIN: CPT

## 2018-08-04 PROCEDURE — 160002 HCHG RECOVERY MINUTES (STAT): Performed by: INTERNAL MEDICINE

## 2018-08-04 PROCEDURE — 700111 HCHG RX REV CODE 636 W/ 250 OVERRIDE (IP): Performed by: HOSPITALIST

## 2018-08-04 PROCEDURE — A9270 NON-COVERED ITEM OR SERVICE: HCPCS | Performed by: INTERNAL MEDICINE

## 2018-08-04 PROCEDURE — C9113 INJ PANTOPRAZOLE SODIUM, VIA: HCPCS | Performed by: HOSPITALIST

## 2018-08-04 RX ORDER — CARVEDILOL 6.25 MG/1
6.25 TABLET ORAL 2 TIMES DAILY WITH MEALS
Status: DISCONTINUED | OUTPATIENT
Start: 2018-08-04 | End: 2018-08-05 | Stop reason: HOSPADM

## 2018-08-04 RX ORDER — ATORVASTATIN CALCIUM 20 MG/1
20 TABLET, FILM COATED ORAL EVERY EVENING
Status: DISCONTINUED | OUTPATIENT
Start: 2018-08-04 | End: 2018-08-05 | Stop reason: HOSPADM

## 2018-08-04 RX ORDER — ACETAMINOPHEN 325 MG/1
650 TABLET ORAL EVERY 6 HOURS PRN
Status: DISCONTINUED | OUTPATIENT
Start: 2018-08-04 | End: 2018-08-05 | Stop reason: HOSPADM

## 2018-08-04 RX ADMIN — POLYETHYLENE GLYCOL 3350, SODIUM SULFATE, SODIUM CHLORIDE, POTASSIUM CHLORIDE, ASCORBIC ACID, SODIUM ASCORBATE 100 G: KIT at 02:51

## 2018-08-04 RX ADMIN — SODIUM CHLORIDE: 9 INJECTION, SOLUTION INTRAVENOUS at 06:57

## 2018-08-04 RX ADMIN — LEVOTHYROXINE SODIUM 100 MCG: 100 TABLET ORAL at 06:00

## 2018-08-04 RX ADMIN — ACETAMINOPHEN 650 MG: 325 TABLET, FILM COATED ORAL at 19:50

## 2018-08-04 RX ADMIN — SODIUM CHLORIDE 8 MG/HR: 9 INJECTION, SOLUTION INTRAVENOUS at 02:52

## 2018-08-04 RX ADMIN — CARVEDILOL 6.25 MG: 6.25 TABLET, FILM COATED ORAL at 17:30

## 2018-08-04 RX ADMIN — SODIUM CHLORIDE: 9 INJECTION, SOLUTION INTRAVENOUS at 18:18

## 2018-08-04 RX ADMIN — LATANOPROST 1 DROP: 50 SOLUTION OPHTHALMIC at 17:31

## 2018-08-04 RX ADMIN — ATORVASTATIN CALCIUM 20 MG: 20 TABLET, FILM COATED ORAL at 17:29

## 2018-08-04 ASSESSMENT — PAIN SCALES - GENERAL
PAINLEVEL_OUTOF10: 0
PAINLEVEL_OUTOF10: 4
PAINLEVEL_OUTOF10: 0

## 2018-08-04 ASSESSMENT — COGNITIVE AND FUNCTIONAL STATUS - GENERAL
SUGGESTED CMS G CODE MODIFIER MOBILITY: CH
SUGGESTED CMS G CODE MODIFIER DAILY ACTIVITY: CH
DAILY ACTIVITIY SCORE: 24
MOBILITY SCORE: 24

## 2018-08-04 ASSESSMENT — ENCOUNTER SYMPTOMS
PSYCHIATRIC NEGATIVE: 1
CARDIOVASCULAR NEGATIVE: 1
NEUROLOGICAL NEGATIVE: 1
MUSCULOSKELETAL NEGATIVE: 1
GASTROINTESTINAL NEGATIVE: 1
RESPIRATORY NEGATIVE: 1
CONSTITUTIONAL NEGATIVE: 1
EYES NEGATIVE: 1

## 2018-08-04 ASSESSMENT — PATIENT HEALTH QUESTIONNAIRE - PHQ9
1. LITTLE INTEREST OR PLEASURE IN DOING THINGS: NOT AT ALL
2. FEELING DOWN, DEPRESSED, IRRITABLE, OR HOPELESS: NOT AT ALL
SUM OF ALL RESPONSES TO PHQ9 QUESTIONS 1 AND 2: 0

## 2018-08-04 NOTE — CARE PLAN
Problem: Infection  Goal: Will remain free from infection  Outcome: PROGRESSING AS EXPECTED  Assessed for signs and symptoms of infection. Standard precautions implemented. Education provided to patient and visitors about hand hygiene.      Problem: Knowledge Deficit  Goal: Knowledge of disease process/condition, treatment plan, diagnostic tests, and medications will improve  Outcome: PROGRESSING AS EXPECTED  Pt educated regarding plan of care for the day, activity, diet, and medications. Verbalized understanding.

## 2018-08-04 NOTE — PROGRESS NOTES
Report received from off going RN. Plan of care reviewed with patient and all questions answered. Bed locked and in lowest position, call light and personal belongings in reach. Non skid socks on. Purposeful rounding. Patient started bowel prep, next prep to be given at 0200. CL diet for now and NPO at midnight, colonoscopy tomorrow at 2 pm.

## 2018-08-04 NOTE — PROGRESS NOTES
[facility] Hospitalist Progress Note    Date of Service: 2018    Chief Complaint  73 y.o. female admitted 8/3/2018 with hematochezia    Interval Problem Update  She reports minimal amount of brbpr this am, no pain, no dizziness  No cp, no sob  Ros otherwise negative  Awaiting colonoscopy this afternoon  axox4    Consultants/Specialty  gi    Disposition          Review of Systems   Constitutional: Negative.    HENT: Negative.    Eyes: Negative.    Respiratory: Negative.    Cardiovascular: Negative.    Gastrointestinal: Negative.    Genitourinary: Negative.    Musculoskeletal: Negative.    Skin: Negative.    Neurological: Negative.    Psychiatric/Behavioral: Negative.       Physical Exam  Laboratory/Imaging   Hemodynamics  Temp (24hrs), Av.5 °C (97.7 °F), Min:36.2 °C (97.1 °F), Max:36.7 °C (98 °F)   Temperature: 36.2 °C (97.1 °F)  Pulse  Av.2  Min: 64  Max: 87 Heart Rate (Monitored): 70  Blood Pressure : 155/88, NIBP: 134/59      Respiratory      Respiration: 15, Pulse Oximetry: 98 %             Fluids    Intake/Output Summary (Last 24 hours) at 18 1138  Last data filed at 18 0659   Gross per 24 hour   Intake             3100 ml   Output                0 ml   Net             3100 ml       Nutrition  Orders Placed This Encounter   Procedures   • DIET NPO     Standing Status:   Standing     Number of Occurrences:   1     Order Specific Question:   Restrict to:     Answer:   Sips with Medications [3]     Physical Exam   Constitutional: She is oriented to person, place, and time. She appears well-developed and well-nourished. No distress.   HENT:   Head: Normocephalic and atraumatic.   Mouth/Throat: Oropharynx is clear and moist.   Eyes: Conjunctivae are normal.   Neck: Normal range of motion. Neck supple.   Cardiovascular: Normal rate, normal heart sounds and intact distal pulses.  Exam reveals no gallop and no friction rub.    No murmur heard.  Pulmonary/Chest: Effort normal and breath sounds  normal. No respiratory distress. She has no wheezes. She has no rales. She exhibits no tenderness.   Abdominal: Soft. Bowel sounds are normal. She exhibits no distension and no mass. There is no tenderness. There is no rebound and no guarding.   Musculoskeletal: Normal range of motion. She exhibits no edema or tenderness.   Neurological: She is alert and oriented to person, place, and time. She has normal reflexes. No cranial nerve deficit. She exhibits normal muscle tone. Coordination normal.   Skin: Skin is warm and dry. No rash noted. She is not diaphoretic. No erythema. No pallor.   Psychiatric: She has a normal mood and affect. Her behavior is normal. Judgment and thought content normal.   Nursing note and vitals reviewed.      Recent Labs      08/03/18   0730  08/03/18   1904  08/04/18   0234  08/04/18   1047   WBC  7.9   --    --    --    RBC  4.53   --    --    --    HEMOGLOBIN  14.3  12.6  12.7  12.6   HEMATOCRIT  41.9   --    --    --    MCV  92.5   --    --    --    MCH  31.6   --    --    --    MCHC  34.1   --    --    --    RDW  46.5   --    --    --    PLATELETCT  191   --    --    --    MPV  8.4*   --    --    --      Recent Labs      08/03/18   0730   SODIUM  140   POTASSIUM  4.0   CHLORIDE  107   CO2  22   GLUCOSE  116*   BUN  25*   CREATININE  0.57   CALCIUM  9.4     Recent Labs      08/03/18   0730   APTT  24.7   INR  1.14*         Recent Labs      08/04/18   0234   TRIGLYCERIDE  284*   HDL  38*   LDL  51          Assessment/Plan     Gastrointestinal bleeding   Assessment & Plan    H/h decreased since admit but now in stable range  Colonoscopy this afternoon  GI following  Continue to follow  Continue ppi        Diastolic dysfunction- (present on admission)   Assessment & Plan    No clinical s/o acute exacerbation  following        Benign essential HTN- (present on admission)   Assessment & Plan    following        Mixed hyperlipidemia- (present on admission)   Assessment & Plan    For right now  holding statin, she is nothing by mouth, once she is stable she can resume a low-fat low-cholesterol diet.  Lab Results   Component Value Date/Time    CHOLSTRLTOT 147 07/26/2018 08:11 AM    LDL 55 07/26/2018 08:11 AM    HDL 50 07/26/2018 08:11 AM    TRIGLYCERIDE 210 (H) 07/26/2018 08:11 AM                Quality-Core Measures

## 2018-08-04 NOTE — OP REPORT
OP Note  Procedure Date: 8/4/2018     PreOp Diagnosis: Hematochezia    PostOp Diagnosis:   Terminal ileum: Normal  Colon: Erythema, edema, superficial ulcers were seen in the sigmoid colon; Mild sigmoid diverticulosis  No fresh blood nor blood clot was seen in the entire exam.    Procedure(s):  COLONOSCOPY - Wound Class: Clean Contaminated    Surgeon(s):  Lucy Carter M.D.    Anesthesiologist/Type of Anesthesia:  Anesthesiologist: Vin Lang M.D./General    Surgical Staff:  Circulator: Scooter Velasquez R.N.  Endoscopy Technician: Jose Calvert    Specimens removed if any:  A. Sigmoid colon r/o ischemic colitis    Estimated Blood Loss: minimal    Anesthesia/Medications:  see anesthesia note     COMPLICATIONS:  No immediate complications.    PROCEDURE IN DETAIL, Findings and ENDOSCOPIC DIAGNOSIS:      -Prior to the procedure, a History and Physical was performed, and patient medications and allergies were reviewed. The patient’s tolerance of previous anesthesia was also reviewed. The risks and benefits of the procedure and the sedation options and risks were discussed with the patient. All questions were answered, and informed consent was obtained. The patient was deemed in satisfactory condition to undergo the procedure.    -Prior Anticoagulants: the patient has taken no previous anticoagulant or antiplatelet agents.    -ASA Grade Assessment: see anesthesia note     -The patient was placed in the left lateral decubitus position. The scope was passed under direct vision. Continuous oxygen was provided via nasal cannula and intravenous sedation was administered in divided doses throughout the procedure. The patient’s blood pressure, pulse, and oxygen saturation were monitored continuously throughout the procedure.    -The colonoscope was gently introduced through the anus and advanced to the terminal ileum, identified by appendiceal orifice & ileocecal valve. The scope was then fully withdrawn while examining  the color, texture, anatomy and integrity of the mucosa from the cecum to the anal canal. The scope was completely retrieved upon exiting the anal canal and the procedure was terminated. The colonoscopy was performed without difficulty. The patient tolerated the procedure well. The quality of the bowel preparation was good. Further details are in the finding paragraph, based on anatomical location.    -The patient tolerated the procedure well and there were no immediate complications. The patient was then transferred to the recovery room in stable condition.    Findings:  Terminal ileum: Normal  Colon: Erythema, edema, superficial ulcers were seen in the sigmoid colon; Mild sigmoid diverticulosis  No fresh blood nor blood clot was seen in the entire exam.    RECOMMENDATIONS:    1. A letter will be sent regarding to the biopsy result in about 2 weeks.  2. Resume diet  3. Keep hydrated  4. OK to discharge home from GI standpoint  5. F/U with [facility] prn.      8/4/2018 3:27 PM Lucy Carter M.D.

## 2018-08-04 NOTE — OR NURSING
1523: received to PACU via bed. Awake. Abdomen soft. Denies any pain.  1610: sips of water given. Tolerated well. Denies any pain or nausea.  1624: report called to Christina MCKEON.  1643: transported to [facility] via bed by RN and transport. Stable.

## 2018-08-04 NOTE — CARE PLAN
Problem: Safety  Goal: Will remain free from falls  Outcome: PROGRESSING AS EXPECTED  Bed locked and in lowest position, call light and personal belongings in reach. Non skid socks on. Purposeful rounding. PT/OT. Patient demonstrates how to properly use call light, no assistance needed when ambulating.       Problem: Bowel/Gastric:  Goal: Normal bowel function is maintained or improved  Outcome: PROGRESSING AS EXPECTED  Bowel prep started prior to shift change, next bowel prep due at 0200. Stools at beginning of the shift we dark black and loose and no currently clear/yellow with flecks of brown/ grey and pink/red. Colonoscopy scheduled for today at 2 pm.

## 2018-08-04 NOTE — PROGRESS NOTES
Patient seen and examined prior to going back to [facility].  Indication of Hematochezia.  Tolerated prep. Minimal blood.     Risks, benefits, and alternatives were discussed with patient.  Consenting person was given an opportunity to ask questions and discuss other options.  Risks including but not limited to perforation, infection, bleeding, missed lesion(s), cardiac and/or pulmonary event, aspiration, stroke, possible need for surgery, hospitalization possibly prolonged, discomfort, unsuccessful and/or incomplete procedure, ineffective therapy or persistent symptoms, possible need for repeat procedures and/or additional testings, damage to adjacent organs and/or vascular structures, medication reaction, disability, death, and other adverse events possibly life-threatening.  Discussion was undertaken with Layman's terms.  Consenting person stated understanding and acceptance of these risks, and wished to proceed.  Consent was given in clear state of mind.

## 2018-08-04 NOTE — PROGRESS NOTES
Assumed care at 0700, bedside report received from night shift RN. Pt is Aox4 with no signs of distress. Pt is SR 70 on the monitor. Initial assessment completed, orders reviewed, call light within reach, and hourly rounding in place. POC addressed with patient, no additional questions at this time.

## 2018-08-04 NOTE — PROGRESS NOTES
2 RN skin check completed with LINDA Bonilla     Redness to bilat elbows, blanching   Dry, calloused feet

## 2018-08-04 NOTE — CARE PLAN
Problem: Communication  Goal: The ability to communicate needs accurately and effectively will improve  Outcome: PROGRESSING AS EXPECTED  Pt educated on POC for the shift. Pt encouraged to voice concerns and ask questions. Pt verbalizes understanding. Pt uses call light appropriately. Pt has no further questions at this time.     Problem: Safety  Goal: Will remain free from falls  Outcome: PROGRESSING AS EXPECTED  Pt up self w/steady gait. Pt educated to use call light and calls for assistance appropriately. Bed in lowest position and locked. Treaded socks in use. Pt remains free from falls at this time.

## 2018-08-05 VITALS
DIASTOLIC BLOOD PRESSURE: 62 MMHG | OXYGEN SATURATION: 96 % | TEMPERATURE: 98.5 F | WEIGHT: 183.2 LBS | BODY MASS INDEX: 34.59 KG/M2 | SYSTOLIC BLOOD PRESSURE: 119 MMHG | HEIGHT: 61 IN | HEART RATE: 67 BPM | RESPIRATION RATE: 16 BRPM

## 2018-08-05 PROBLEM — K92.2 GASTROINTESTINAL BLEEDING: Status: RESOLVED | Noted: 2018-08-03 | Resolved: 2018-08-05

## 2018-08-05 LAB
BASOPHILS # BLD AUTO: 0.3 % (ref 0–1.8)
BASOPHILS # BLD: 0.02 K/UL (ref 0–0.12)
EOSINOPHIL # BLD AUTO: 0.09 K/UL (ref 0–0.51)
EOSINOPHIL NFR BLD: 1.3 % (ref 0–6.9)
ERYTHROCYTE [DISTWIDTH] IN BLOOD BY AUTOMATED COUNT: 47.5 FL (ref 35.9–50)
HCT VFR BLD AUTO: 35.6 % (ref 37–47)
HGB BLD-MCNC: 11.9 G/DL (ref 12–16)
IMM GRANULOCYTES # BLD AUTO: 0.03 K/UL (ref 0–0.11)
IMM GRANULOCYTES NFR BLD AUTO: 0.4 % (ref 0–0.9)
LYMPHOCYTES # BLD AUTO: 1.1 K/UL (ref 1–4.8)
LYMPHOCYTES NFR BLD: 15.5 % (ref 22–41)
MCH RBC QN AUTO: 31.1 PG (ref 27–33)
MCHC RBC AUTO-ENTMCNC: 33.4 G/DL (ref 33.6–35)
MCV RBC AUTO: 93 FL (ref 81.4–97.8)
MONOCYTES # BLD AUTO: 0.42 K/UL (ref 0–0.85)
MONOCYTES NFR BLD AUTO: 5.9 % (ref 0–13.4)
NEUTROPHILS # BLD AUTO: 5.44 K/UL (ref 2–7.15)
NEUTROPHILS NFR BLD: 76.6 % (ref 44–72)
NRBC # BLD AUTO: 0 K/UL
NRBC BLD-RTO: 0 /100 WBC
PLATELET # BLD AUTO: 174 K/UL (ref 164–446)
PMV BLD AUTO: 8.5 FL (ref 9–12.9)
RBC # BLD AUTO: 3.83 M/UL (ref 4.2–5.4)
WBC # BLD AUTO: 7.1 K/UL (ref 4.8–10.8)

## 2018-08-05 PROCEDURE — 99239 HOSP IP/OBS DSCHRG MGMT >30: CPT | Performed by: HOSPITALIST

## 2018-08-05 PROCEDURE — 36415 COLL VENOUS BLD VENIPUNCTURE: CPT

## 2018-08-05 PROCEDURE — 85025 COMPLETE CBC W/AUTO DIFF WBC: CPT

## 2018-08-05 PROCEDURE — 700105 HCHG RX REV CODE 258: Performed by: HOSPITALIST

## 2018-08-05 PROCEDURE — A9270 NON-COVERED ITEM OR SERVICE: HCPCS | Performed by: HOSPITALIST

## 2018-08-05 PROCEDURE — 700102 HCHG RX REV CODE 250 W/ 637 OVERRIDE(OP): Performed by: INTERNAL MEDICINE

## 2018-08-05 PROCEDURE — A9270 NON-COVERED ITEM OR SERVICE: HCPCS | Performed by: INTERNAL MEDICINE

## 2018-08-05 PROCEDURE — 700102 HCHG RX REV CODE 250 W/ 637 OVERRIDE(OP): Performed by: HOSPITALIST

## 2018-08-05 RX ADMIN — CARVEDILOL 6.25 MG: 6.25 TABLET, FILM COATED ORAL at 05:22

## 2018-08-05 RX ADMIN — SODIUM CHLORIDE: 9 INJECTION, SOLUTION INTRAVENOUS at 05:22

## 2018-08-05 RX ADMIN — ACETAMINOPHEN 650 MG: 325 TABLET, FILM COATED ORAL at 07:48

## 2018-08-05 RX ADMIN — ACETAMINOPHEN 650 MG: 325 TABLET, FILM COATED ORAL at 01:50

## 2018-08-05 ASSESSMENT — PATIENT HEALTH QUESTIONNAIRE - PHQ9
1. LITTLE INTEREST OR PLEASURE IN DOING THINGS: NOT AT ALL
SUM OF ALL RESPONSES TO PHQ9 QUESTIONS 1 AND 2: 0
2. FEELING DOWN, DEPRESSED, IRRITABLE, OR HOPELESS: NOT AT ALL

## 2018-08-05 NOTE — PROGRESS NOTES
Bedside report received by day LINDA Vasquez. Patient sitting up in bed watching TV at this time. POC discussed, verbalized understanding. No immediate concerns for patient at this time. All safety measures in place.

## 2018-08-05 NOTE — DISCHARGE INSTRUCTIONS
Discharge Instructions    Discharged to home by car with friend. Discharged via wheelchair, hospital escort: Yes.  Special equipment needed: Not Applicable    Be sure to schedule a follow-up appointment with your primary care doctor or any specialists as instructed.     Discharge Plan:   Diet Plan: Discussed  Activity Level: Discussed  Confirmed Follow up Appointment: Patient to Call and Schedule Appointment  Confirmed Symptoms Management: Discussed  Medication Reconciliation Updated: Yes  Influenza Vaccine Indication: Not indicated: Previously immunized this influenza season and > 8 years of age    I understand that a diet low in cholesterol, fat, and sodium is recommended for good health. Unless I have been given specific instructions below for another diet, I accept this instruction as my diet prescription.   Other diet: Low Fiber    Special Instructions: None    · Is patient discharged on Warfarin / Coumadin?   No       Gastrointestinal Bleeding  Introduction  Gastrointestinal bleeding is bleeding somewhere along the path food travels through the body (digestive tract). This path is anywhere between the mouth and the opening of the butt (anus). You may have blood in your poop (stools) or have black poop. If you throw up (vomit), there may be blood in it.  This condition can be mild, serious, or even life-threatening. If you have a lot of bleeding, you may need to stay in the hospital.  Follow these instructions at home:  · Take over-the-counter and prescription medicines only as told by your doctor.  · Eat foods that have a lot of fiber in them. These foods include whole grains, fruits, and vegetables. You can also try eating 1-3 prunes each day.  · Drink enough fluid to keep your pee (urine) clear or pale yellow.  · Keep all follow-up visits as told by your doctor. This is important.  Contact a doctor if:  · Your symptoms do not get better.  Get help right away if:  · Your bleeding gets worse.  · You feel dizzy  or you pass out (faint).  · You feel weak.  · You have very bad cramps in your back or belly (abdomen).  · You pass large clumps of blood (clots) in your poop.  · Your symptoms are getting worse.  This information is not intended to replace advice given to you by your health care provider. Make sure you discuss any questions you have with your health care provider.  Document Released: 09/26/2009 Document Revised: 05/25/2017 Document Reviewed: 06/06/2016  © 2017 [employer]      Depression / Suicide Risk    As you are discharged from this [facility] facility, it is important to learn how to keep safe from harming yourself.    Recognize the warning signs:  · Abrupt changes in personality, positive or negative- including increase in energy   · Giving away possessions  · Change in eating patterns- significant weight changes-  positive or negative  · Change in sleeping patterns- unable to sleep or sleeping all the time   · Unwillingness or inability to communicate  · Depression  · Unusual sadness, discouragement and loneliness  · Talk of wanting to die  · Neglect of personal appearance   · Rebelliousness- reckless behavior  · Withdrawal from people/activities they love  · Confusion- inability to concentrate     If you or a loved one observes any of these behaviors or has concerns about self-harm, here's what you can do:  · Talk about it- your feelings and reasons for harming yourself  · Remove any means that you might use to hurt yourself (examples: pills, rope, extension cords, firearm)  · Get professional help from the community (Mental Health, Substance Abuse, psychological counseling)  · Do not be alone:Call your Safe Contact- someone whom you trust who will be there for you.  · Call your local CRISIS HOTLINE 793-8637 or 911-947-5974  · Call your local Children's Mobile Crisis Response Team [place] (095) 509-1885 or www.[employer]  · Call the toll free National Suicide Prevention Hotlines   · National  Suicide Prevention Lifeline 623-457-JJZB (4800)  · National [place] [employer] Network 800-SUICIDE (252-0149)

## 2018-08-05 NOTE — PROGRESS NOTES
"Gastroenterology Consult Note:    Ting-Estela Emma  Date & Time note created:    8/5/2018   11:00 AM     Patient ID:  Name:             Maryam Gomez  YOB: 1945  Age:                 73 y.o.  female  MRN:               [id number]    Referring MD:  Dr. Mendez                                                             Chief Complaint(s):      Ischemic colitis    History of Present Illness:    This is a very pleasant 73 y.o. female with the past medical history as listed below.     She has h/o GERD on PPI, heartburn, H pylori gastritis, diverticulosis, and colon polyps. Last EGD in 2012 showed hiatal hernia, and COL showed a hyperplastic polyp. She has been doing ok until 8/2/18 evening, when she started to pass small blood clot via the rectum. She had a few more times of small amount blood clots passage. She thus decided to come to ER. She has been having some problem with constipation. On average, the patient has 1-2 BMs a day, mostly type 3 on the [place] stool chart, sometimes difficult, on the toilet 2-3 mins, with effort sometimes.     The patient does take NSAIDs (ASA 81 mg daily).  She denies epigastric pain, abd pain, fever.  No vomiting.  No dizziness.  Patient had DVT \"years ago\", however does not take blood thinners for this she states    Otherwise the patient is doing fine without complaints of fever/chills/weight loss/appetite change/dysphagia/odynophagia/heartburn/nausea/vomiting/bloating/diarrhea/melena or abdominal pain.  ===  8/4/2018 COL:   - Terminal ileum: Normal   - Colon: Erythema, edema, superficial ulcers were seen in the sigmoid colon; Mild sigmoid diverticulosis   - No fresh blood nor blood clot was seen in the entire exam.   8/5/2018 Had some pain after COL, mainly at the LLQ. Much better today.    Review of Systems:      Constitutional: Denies fevers, weight changes  Eyes: Denies changes in vision, jaundice  Ears/Nose/Throat/Mouth: Denies nasal congestion or sore " throat   Cardiovascular: Denies chest pain or palpitations   Respiratory: Denies shortness of breath, denies cough  Gastrointestinal/Hepatic: Denies abdominal pain, nausea, vomiting, diarrhea; pos constipation or GI bleeding  Genitourinary: Denies dysuria or frequency  Musculoskeletal/Rheum: Denies  joint pain and swelling, edema  Skin: Denies rash  Neurological: Denies headache, confusion, memory loss or focal weakness/parasthesias  Psychiatric: denies mood disorder   Endocrine: Ivana thyroid problems  Heme/Oncology/Lymph Nodes: Denies enlarged lymph nodes, denies brusing or known bleeding disorder  All other systems were reviewed and are negative (AMA/CMS criteria)              Past Medical History:   Past Medical History:   Diagnosis Date   • Anxiety    • Arthritis     osteoarthritis   • Cramp of limb 3/16/2012   • Diastolic dysfunction 3/16/2012   • DVT (deep venous thrombosis) (HCC)     after a fall with 6mo coumadin   • Edema 3/16/2012   • GERD (gastroesophageal reflux disease)    • Glaucoma    • Heart burn     right shoulder   • Hiatus hernia syndrome    • Hypercholesteremia    • Hyperlipidemia 3/16/2012   • Hypertension    • Hypothyroid    • Indigestion    • Osteopenia    • Palpitations 3/16/2012   • Polio      Active Hospital Problems    Diagnosis   • Diastolic dysfunction [I51.9]     Priority: Medium   • Benign essential HTN [I10]     Priority: Low   • Mixed hyperlipidemia [E78.2]     Priority: Low       Past Surgical History:  Past Surgical History:   Procedure Laterality Date   • SHOULDER DECOMPRESSION ARTHROSCOPIC Right 5/18/2016    Procedure: SHOULDER DECOMPRESSION ARTHROSCOPIC - SUBACROMIAL;  Surgeon: Joseph Ricketts M.D.;  Location: SURGERY [facility];  Service:    • SHOULDER ARTHROSCOPY W/ ROTATOR CUFF REPAIR Right 5/18/2016    Procedure: SHOULDER ARTHROSCOPY W/ ROTATOR CUFF REPAIR ;  Surgeon: Joseph Ricketts M.D.;  Location: SURGERY [facility];  Service:    • HIP  ARTHROPLASTY TOTAL  11/14/2012    Performed by Joseph Ricketts M.D. at SURGERY [facility]   • FOOT SURGERY  1993   • OTHER  1990    nose   • HYSTERECTOMY LAPAROSCOPY  1984   • TONSILLECTOMY  1965       Hospital Medications:  Current Facility-Administered Medications   Medication Dose Frequency Provider Last Rate Last Dose   • carvedilol (COREG) tablet 6.25 mg  6.25 mg BID WITH MEALS Danna Mendez M.D.   6.25 mg at 08/05/18 0522   • atorvastatin (LIPITOR) tablet 20 mg  20 mg Q EVENING Danna Mendez M.D.   20 mg at 08/04/18 1729   • acetaminophen (TYLENOL) tablet 650 mg  650 mg Q6HRS PRN Rosette Travis M.D.   650 mg at 08/05/18 0748   • latanoprost (XALATAN) 0.005 % ophthalmic solution 1 Drop  1 Drop Nightly Argelia Kim M.D.   1 Drop at 08/04/18 1731   • levothyroxine (SYNTHROID) tablet 100 mcg  100 mcg Once per day on Mon Tue Wed Thu Fri Sat Argelia Kim M.D.   100 mcg at 08/04/18 0600   • senna-docusate (PERICOLACE or SENOKOT S) 8.6-50 MG per tablet 2 Tab  2 Tab BID Argelia Kim M.D.   Stopped at 08/04/18 0600    And   • polyethylene glycol/lytes (MIRALAX) PACKET 1 Packet  1 Packet QDAY PRN Argelia Kim M.D.        And   • magnesium hydroxide (MILK OF MAGNESIA) suspension 30 mL  30 mL QDAY PRN Argelia Kim M.D.        And   • bisacodyl (DULCOLAX) suppository 10 mg  10 mg QDAY PRN Argelia Kim M.D.       • NS infusion   Continuous Argelia iKm M.D. 100 mL/hr at 08/05/18 0522     • labetalol (NORMODYNE,TRANDATE) injection 10 mg  10 mg Q4HRS PRN Argelia Kim M.D.       • ondansetron (ZOFRAN) syringe/vial injection 4 mg  4 mg Q4HRS PRN Argelia Kim M.D.       • ondansetron (ZOFRAN ODT) dispertab 4 mg  4 mg Q4HRS PRN Argelia Kim M.D.         Last reviewed on 8/3/2018  9:17 AM by Argelia Kim M.D.    Current Outpatient Medications:  Prescriptions Prior to Admission   Medication Sig Dispense Refill Last Dose   • Lactase (LACTAID PO) Take 1 Tab by mouth 2 times daily, before  breakfast and dinner.   8/2/2018 at Unknown time   • Omega-3 Fatty Acids (FISH OIL) 1200 MG Cap Take 1,200 mg by mouth every day.   8/2/2018 at Unknown time   • [DISCONTINUED] acetaminophen (TYLENOL 8 HOUR) 650 MG CR tablet Take 650 mg by mouth every 6 hours as needed.   unknown at Unknown time   • raNITidine (ZANTAC) 150 MG Tab Take 150 mg by mouth 2 times a day.   8/2/2018 at Unknown time   • carvedilol (COREG) 6.25 MG Tab Take 6.25 mg by mouth 2 times a day, with meals.   8/3/2018 at 0530   • atorvastatin (LIPITOR) 20 MG Tab Take 1 Tab by mouth every evening. 30 Tab 11 8/2/2018 at Unknown time   • lansoprazole (PREVACID) 30 MG CAPSULE DELAYED RELEASE Take 30 mg by mouth 2 times daily, before breakfast and dinner.   8/3/2018 at 0530   • [DISCONTINUED] aspirin EC (ECOTRIN) 81 MG TBEC Take 81 mg by mouth every day.   8/2/2018 at Unknown time   • Multiple Vitamins-Minerals (MULTI COMPLETE PO) Take 1 Tab by mouth every day.   8/2/2018 at Unknown time   • Calcium-Magnesium-Vitamin D (CITRACAL CALCIUM+D PO) Take 1 Tab by mouth every day.   8/2/2018 at Unknown time   • Glucosamine-Chondroitin (COSAMIN DS PO) Take 1 Tab by mouth every day.   8/2/2018 at Unknown time   • B Complex Vitamins (VITAMIN B COMPLEX PO) Take 1 Tab by mouth every day.   8/2/2018 at Unknown time   • LUTEIN Take 1 Tab by mouth every day.   8/2/2018 at Unknown time   • latanoprost (XALATAN) 0.005 % SOLN Place 1 Drop in both eyes every evening.     8/2/2018 at unknown   • levothyroxine (SYNTHROID) 100 MCG TABS Take 100 mcg by mouth See Admin Instructions. 6 days a week   8/3/2018 at 0530   • lisinopril (PRINIVIL) 10 MG TABS Take 10 mg by mouth every morning.   8/3/2018 at 0530   • loratadine (ALAVERT) 10 MG TABS Take 10 mg by mouth every day.   8/2/2018 at unknwon       Medication Allergy:  No Known Allergies    Family History:  Family History   Problem Relation Age of Onset   • Heart Attack Neg Hx    • Heart Disease Neg Hx    • Heart Failure Neg Hx   "      Social History:  Social History     Social History   • Marital status:      Spouse name: N/A   • Number of children: N/A   • Years of education: N/A     Occupational History   • Not on file.     Social History Main Topics   • Smoking status: Former Smoker     Packs/day: 1.00     Years: 27.00     Types: Cigarettes     Quit date: 1/1/1989   • Smokeless tobacco: Never Used      Comment: Quit 1989   • Alcohol use Yes      Comment: 3 per day   • Drug use: No   • Sexual activity: Not on file     Other Topics Concern   • Not on file     Social History Narrative   • No narrative on file       Physical Exam:  Weight/BMI: Body mass index is 34.62 kg/m².  Blood pressure 119/62, pulse 67, temperature 36.9 °C (98.5 °F), resp. rate 16, height 1.549 m (5' 1\"), weight 83.1 kg (183 lb 3.2 oz), SpO2 96 %, not currently breastfeeding.  Vitals:    08/04/18 2200 08/04/18 2300 08/05/18 0440 08/05/18 0840   BP: 148/78 118/55 118/64 119/62   Pulse: 84 60 75 67   Resp:   15 16   Temp:   36.3 °C (97.4 °F) 36.9 °C (98.5 °F)   SpO2: 94% 93% 92% 96%   Weight:       Height:         Oxygen Therapy:  Pulse Oximetry: 96 %, O2 (LPM): 0, O2 Delivery: None (Room Air)    Intake/Output Summary (Last 24 hours) at 08/05/18 1100  Last data filed at 08/04/18 1600   Gross per 24 hour   Intake              800 ml   Output                0 ml   Net              800 ml       Constitutional:   Well developed, well nourished, no acute distress  HEENT:  Normocephalic, Atraumatic, Conjunctiva not pale, Sclera not icteric, Oropharynx moist mucous membranes, No oral exudates, Nose normal.  No thyromegaly.  Neck:  Normal range of motion, No cervical tenderness,  no JVD.  Chest/Lungs:  Symmetric expansion, no spider angioma, breath sounds clear to auscultation bilaterally,  no crackles, no wheezing.   Cardiovascular:  Normal heart rate, Normal rhythm, No murmurs, No rubs, No gallops.    Abdomen: Bowel sounds normal, Soft, minimal LLQ tenderness, No " guarding, No rebound, No masses, No hepatosplenomegaly. BENY per ER: red blood  Extremities: No cyanosis/clubbing/edema/palmar erythema/flapping tremor  Skin: Warm, Dry, No erythema, No rash, no induration.    MDM (Data Review):     Records reviewed and summarized in current documentation    Lab Data Review:  Recent Results (from the past 24 hour(s))   HGB (Hemoglobin) for 48 hours    Collection Time: 08/04/18  6:36 PM   Result Value Ref Range    Hemoglobin 13.0 12.0 - 16.0 g/dL   CBC WITH DIFFERENTIAL    Collection Time: 08/05/18  2:17 AM   Result Value Ref Range    WBC 7.1 4.8 - 10.8 K/uL    RBC 3.83 (L) 4.20 - 5.40 M/uL    Hemoglobin 11.9 (L) 12.0 - 16.0 g/dL    Hematocrit 35.6 (L) 37.0 - 47.0 %    MCV 93.0 81.4 - 97.8 fL    MCH 31.1 27.0 - 33.0 pg    MCHC 33.4 (L) 33.6 - 35.0 g/dL    RDW 47.5 35.9 - 50.0 fL    Platelet Count 174 164 - 446 K/uL    MPV 8.5 (L) 9.0 - 12.9 fL    Neutrophils-Polys 76.60 (H) 44.00 - 72.00 %    Lymphocytes 15.50 (L) 22.00 - 41.00 %    Monocytes 5.90 0.00 - 13.40 %    Eosinophils 1.30 0.00 - 6.90 %    Basophils 0.30 0.00 - 1.80 %    Immature Granulocytes 0.40 0.00 - 0.90 %    Nucleated RBC 0.00 /100 WBC    Neutrophils (Absolute) 5.44 2.00 - 7.15 K/uL    Lymphs (Absolute) 1.10 1.00 - 4.80 K/uL    Monos (Absolute) 0.42 0.00 - 0.85 K/uL    Eos (Absolute) 0.09 0.00 - 0.51 K/uL    Baso (Absolute) 0.02 0.00 - 0.12 K/uL    Immature Granulocytes (abs) 0.03 0.00 - 0.11 K/uL    NRBC (Absolute) 0.00 K/uL       MDM (Assessment and Plan):     Active Hospital Problems    Diagnosis   • Diastolic dysfunction [I51.9]     Priority: Medium   • Benign essential HTN [I10]     Priority: Low   • Mixed hyperlipidemia [E78.2]     Priority: Low       Imaging/Procedures Review:    8/3/18 CT:  No evidence of active GI arterial bleeding.  Diverticula colon. No evidence diverticulitis. No free fluid.  Hepatic steatosis.    Assessment  1. Ischemic colitis with BRBPR, improving  2. GERD  3. Hypertension  4.  Hyperlipidemia    Plan  1. A letter will be sent regarding to the biopsy result in about 2 weeks.  2. Continue low fiber diet 1 wk, then back to regular diet  3. Keep hydrated  4. OK to discharge home from GI standpoint.   5. F/U with Dr. Pride as previously recommended.    Thank you very much for allowing me to participate in the care of your patient.  Please feel free to contact me anytime at [phone].     Lucy Carter M.D.    Core Quality Measures   Reviewed items::  Labs, Medications and Radiology reports reviewed

## 2018-08-05 NOTE — CARE PLAN
Problem: Safety  Goal: Will remain free from injury  Outcome: PROGRESSING AS EXPECTED  Call light within reach, hourly rounding in practice.      Problem: Knowledge Deficit  Goal: Knowledge of disease process/condition, treatment plan, diagnostic tests, and medications will improve  Outcome: PROGRESSING AS EXPECTED  Discussed POC and medications with patient, pt. verbalized understanding.

## 2018-08-05 NOTE — DISCHARGE SUMMARY
Discharge Summary    CHIEF COMPLAINT ON ADMISSION  Chief Complaint   Patient presents with   • Bloody Stools       Reason for Admission  EMS B=17     Admission Date  8/3/2018    CODE STATUS  Full Code    HPI & HOSPITAL COURSE  This is a 73 y.o. female here with bright red blood per rectum.  This resolved, she did not require any blood transfusions.  She underwent a colonoscopy and sigmoid ulcers (with no active bleeding) were noted.  She was cleared by GI and will follow up with them as an outpatient.  Biopsy results will be discussed by GI at outpatient follow up.  Her chronic aspirin was held and can likely be restarted in 1-2 weeks.  She agrees to follow up with her pcp next week and to return to the er if needed.  A repeat cbc is recommended in 1-2 weeks per pcp.    Therefore, she is discharged in fair and stable condition to home with close outpatient follow-up.    The patient met 2-midnight criteria for an inpatient stay at the time of discharge.    Discharge Date  8/5/18    FOLLOW UP ITEMS POST DISCHARGE  pcp    DISCHARGE DIAGNOSES  Active Problems:    Diastolic dysfunction (Chronic) POA: Yes    Mixed hyperlipidemia POA: Yes    Benign essential HTN POA: Yes  Resolved Problems:    Gastrointestinal bleeding POA: Unknown      FOLLOW UP  No future appointments.  Baltazar Rojas M.D.  [address] [facility] [id number]  [id number]  [facility] [address]  [phone]    In 1 week        MEDICATIONS ON DISCHARGE     Medication List      CONTINUE taking these medications      Instructions   ALAVERT 10 MG Tabs  Generic drug:  loratadine   Take 10 mg by mouth every day.  Dose:  10 mg     atorvastatin 20 MG Tabs  Commonly known as:  LIPITOR   Take 1 Tab by mouth every evening.  Dose:  20 mg     carvedilol 6.25 MG Tabs  Commonly known as:  COREG   Take 6.25 mg by mouth 2 times a day, with meals.  Dose:  6.25 mg     CITRACAL CALCIUM+D PO   Take 1 Tab by mouth every day.  Dose:  1 Tab     COSAMIN DS PO   Take 1 Tab by mouth every day.  Dose:  1 Tab      Fish Oil 1200 MG Caps   Take 1,200 mg by mouth every day.  Dose:  1200 mg     LACTAID PO   Take 1 Tab by mouth 2 times daily, before breakfast and dinner.  Dose:  1 Tab     lansoprazole 30 MG Cpdr  Commonly known as:  PREVACID   Take 30 mg by mouth 2 times daily, before breakfast and dinner.  Dose:  30 mg     latanoprost 0.005 % Soln  Commonly known as:  XALATAN   Place 1 Drop in both eyes every evening.  Dose:  1 Drop     levothyroxine 100 MCG Tabs  Commonly known as:  SYNTHROID   Take 100 mcg by mouth See Admin Instructions. 6 days a week  Dose:  100 mcg     lisinopril 10 MG Tabs  Commonly known as:  PRINIVIL   Take 10 mg by mouth every morning.  Dose:  10 mg     LUTEIN   Take 1 Tab by mouth every day.  Dose:  1 Tab     MULTI COMPLETE PO   Take 1 Tab by mouth every day.  Dose:  1 Tab     raNITidine 150 MG Tabs  Commonly known as:  ZANTAC   Take 150 mg by mouth 2 times a day.  Dose:  150 mg     VITAMIN B COMPLEX PO   Take 1 Tab by mouth every day.  Dose:  1 Tab        STOP taking these medications    aspirin EC 81 MG Tbec  Commonly known as:  ECOTRIN     TYLENOL 8 HOUR 650 MG CR tablet  Generic drug:  acetaminophen            Allergies  No Known Allergies    DIET  Orders Placed This Encounter   Procedures   • Diet Order Low Fiber(GI Soft)     Standing Status:   Standing     Number of Occurrences:   1     Order Specific Question:   Diet:     Answer:   Low Fiber(GI Soft) [2]       ACTIVITY  As tolerated.    CONSULTATIONS  [facility]    PROCEDURES  CTA ABDOMEN PELVIS W & W/O POST PROCESS   Final Result      No evidence of active GI arterial bleeding.   Diverticula colon. No evidence diverticulitis. No free fluid.   Hepatic steatosis.            LABORATORY  Lab Results   Component Value Date    SODIUM 140 08/03/2018    POTASSIUM 4.0 08/03/2018    CHLORIDE 107 08/03/2018    CO2 22 08/03/2018    GLUCOSE 116 (H) 08/03/2018    BUN 25 (H) 08/03/2018    CREATININE 0.57 08/03/2018        Lab Results   Component Value Date     WBC 7.1 08/05/2018    HEMOGLOBIN 11.9 (L) 08/05/2018    HEMATOCRIT 35.6 (L) 08/05/2018    PLATELETCT 174 08/05/2018        Total time of the discharge process exceeds 45 minutes.

## 2018-08-05 NOTE — PROGRESS NOTES
Pt to be dc'd to home, pt stated feeling well and ready to go. PIV in LAC dc'd, cath tip intact, pt tolerated with min pain and min bleeding, pressure held for 2-3 mins until bleeding stopped then gauze and tape dressing applied. DC edu provided on pt meds, home/self care, s/s internal bleeding, s/s to call physician, s/s to return to er, safety in the home, fall prevention in the home, infection control in the home, and f/u appts.  Pt stated understanding and signed dc paperwork.  Pt escorted down to ER  by in RN in wheelchair to friends waiting vehicle.  End of treatment.

## 2018-08-05 NOTE — PROGRESS NOTES
Pt back up to unit from OR. Pt is AOx4 and has no complaints of pain. VSS. No assessment changes. Post op vitals q15 x4 done in PACU per RN. Vital signs restarted.

## 2018-08-08 ENCOUNTER — APPOINTMENT (RX ONLY)
Dept: URBAN - METROPOLITAN AREA CLINIC 4 | Facility: CLINIC | Age: 73
Setting detail: DERMATOLOGY
End: 2018-08-08

## 2018-08-08 DIAGNOSIS — D18.0 HEMANGIOMA: ICD-10-CM

## 2018-08-08 DIAGNOSIS — D22 MELANOCYTIC NEVI: ICD-10-CM

## 2018-08-08 DIAGNOSIS — L81.4 OTHER MELANIN HYPERPIGMENTATION: ICD-10-CM

## 2018-08-08 DIAGNOSIS — Z85.828 PERSONAL HISTORY OF OTHER MALIGNANT NEOPLASM OF SKIN: ICD-10-CM

## 2018-08-08 DIAGNOSIS — L82.1 OTHER SEBORRHEIC KERATOSIS: ICD-10-CM

## 2018-08-08 DIAGNOSIS — L57.8 OTHER SKIN CHANGES DUE TO CHRONIC EXPOSURE TO NONIONIZING RADIATION: ICD-10-CM

## 2018-08-08 PROBLEM — D22.5 MELANOCYTIC NEVI OF TRUNK: Status: ACTIVE | Noted: 2018-08-08

## 2018-08-08 PROBLEM — D18.01 HEMANGIOMA OF SKIN AND SUBCUTANEOUS TISSUE: Status: ACTIVE | Noted: 2018-08-08

## 2018-08-08 PROCEDURE — 99214 OFFICE O/P EST MOD 30 MIN: CPT

## 2018-08-08 PROCEDURE — ? COUNSELING

## 2018-08-08 ASSESSMENT — LOCATION ZONE DERM
LOCATION ZONE: NECK
LOCATION ZONE: TRUNK
LOCATION ZONE: HAND
LOCATION ZONE: FACE

## 2018-08-08 ASSESSMENT — LOCATION DETAILED DESCRIPTION DERM
LOCATION DETAILED: RIGHT INFERIOR MEDIAL UPPER BACK
LOCATION DETAILED: RIGHT INFERIOR ANTERIOR NECK
LOCATION DETAILED: RIGHT INFERIOR CENTRAL MALAR CHEEK
LOCATION DETAILED: LEFT RADIAL DORSAL HAND
LOCATION DETAILED: RIGHT SUPERIOR UPPER BACK
LOCATION DETAILED: RIGHT RADIAL DORSAL HAND
LOCATION DETAILED: LEFT SUPERIOR UPPER BACK

## 2018-08-08 ASSESSMENT — LOCATION SIMPLE DESCRIPTION DERM
LOCATION SIMPLE: RIGHT CHEEK
LOCATION SIMPLE: RIGHT UPPER BACK
LOCATION SIMPLE: RIGHT ANTERIOR NECK
LOCATION SIMPLE: RIGHT HAND
LOCATION SIMPLE: LEFT UPPER BACK
LOCATION SIMPLE: LEFT HAND

## 2018-08-14 ENCOUNTER — HOSPITAL ENCOUNTER (OUTPATIENT)
Dept: LAB | Facility: MEDICAL CENTER | Age: 73
End: 2018-08-14
Attending: FAMILY MEDICINE
Payer: MEDICARE

## 2018-08-14 LAB
ANION GAP SERPL CALC-SCNC: 10 MMOL/L (ref 0–11.9)
BASOPHILS # BLD AUTO: 1 % (ref 0–1.8)
BASOPHILS # BLD: 0.06 K/UL (ref 0–0.12)
BUN SERPL-MCNC: 29 MG/DL (ref 8–22)
CALCIUM SERPL-MCNC: 9.5 MG/DL (ref 8.5–10.5)
CHLORIDE SERPL-SCNC: 107 MMOL/L (ref 96–112)
CO2 SERPL-SCNC: 25 MMOL/L (ref 20–33)
CREAT SERPL-MCNC: 0.7 MG/DL (ref 0.5–1.4)
EOSINOPHIL # BLD AUTO: 0.31 K/UL (ref 0–0.51)
EOSINOPHIL NFR BLD: 5.1 % (ref 0–6.9)
ERYTHROCYTE [DISTWIDTH] IN BLOOD BY AUTOMATED COUNT: 47.7 FL (ref 35.9–50)
GLUCOSE SERPL-MCNC: 91 MG/DL (ref 65–99)
HCT VFR BLD AUTO: 40.6 % (ref 37–47)
HGB BLD-MCNC: 13.1 G/DL (ref 12–16)
IMM GRANULOCYTES # BLD AUTO: 0.02 K/UL (ref 0–0.11)
IMM GRANULOCYTES NFR BLD AUTO: 0.3 % (ref 0–0.9)
LYMPHOCYTES # BLD AUTO: 2.17 K/UL (ref 1–4.8)
LYMPHOCYTES NFR BLD: 35.7 % (ref 22–41)
MCH RBC QN AUTO: 30.8 PG (ref 27–33)
MCHC RBC AUTO-ENTMCNC: 32.3 G/DL (ref 33.6–35)
MCV RBC AUTO: 95.5 FL (ref 81.4–97.8)
MONOCYTES # BLD AUTO: 0.36 K/UL (ref 0–0.85)
MONOCYTES NFR BLD AUTO: 5.9 % (ref 0–13.4)
NEUTROPHILS # BLD AUTO: 3.16 K/UL (ref 2–7.15)
NEUTROPHILS NFR BLD: 52 % (ref 44–72)
NRBC # BLD AUTO: 0 K/UL
NRBC BLD-RTO: 0 /100 WBC
PLATELET # BLD AUTO: 270 K/UL (ref 164–446)
PMV BLD AUTO: 8.8 FL (ref 9–12.9)
POTASSIUM SERPL-SCNC: 3.8 MMOL/L (ref 3.6–5.5)
RBC # BLD AUTO: 4.25 M/UL (ref 4.2–5.4)
SODIUM SERPL-SCNC: 142 MMOL/L (ref 135–145)
TSH SERPL DL<=0.005 MIU/L-ACNC: 0.32 UIU/ML (ref 0.38–5.33)
WBC # BLD AUTO: 6.1 K/UL (ref 4.8–10.8)

## 2018-08-14 PROCEDURE — 84443 ASSAY THYROID STIM HORMONE: CPT

## 2018-08-14 PROCEDURE — 80048 BASIC METABOLIC PNL TOTAL CA: CPT

## 2018-08-14 PROCEDURE — 85025 COMPLETE CBC W/AUTO DIFF WBC: CPT

## 2018-08-14 PROCEDURE — 36415 COLL VENOUS BLD VENIPUNCTURE: CPT

## 2018-09-12 ENCOUNTER — APPOINTMENT (OUTPATIENT)
Dept: RADIOLOGY | Facility: MEDICAL CENTER | Age: 73
End: 2018-09-12
Attending: EMERGENCY MEDICINE
Payer: MEDICARE

## 2018-09-12 ENCOUNTER — HOSPITAL ENCOUNTER (EMERGENCY)
Facility: MEDICAL CENTER | Age: 73
End: 2018-09-12
Attending: EMERGENCY MEDICINE
Payer: MEDICARE

## 2018-09-12 VITALS
RESPIRATION RATE: 16 BRPM | TEMPERATURE: 98 F | OXYGEN SATURATION: 99 % | HEIGHT: 61 IN | BODY MASS INDEX: 32.1 KG/M2 | SYSTOLIC BLOOD PRESSURE: 182 MMHG | DIASTOLIC BLOOD PRESSURE: 118 MMHG | HEART RATE: 69 BPM | WEIGHT: 170 LBS

## 2018-09-12 DIAGNOSIS — S09.90XA CLOSED HEAD INJURY, INITIAL ENCOUNTER: ICD-10-CM

## 2018-09-12 DIAGNOSIS — S01.81XA FACIAL LACERATION, INITIAL ENCOUNTER: ICD-10-CM

## 2018-09-12 DIAGNOSIS — S83.92XA SPRAIN OF LEFT KNEE, UNSPECIFIED LIGAMENT, INITIAL ENCOUNTER: ICD-10-CM

## 2018-09-12 DIAGNOSIS — S02.92XA CLOSED FRACTURE OF FACIAL BONE, UNSPECIFIED FACIAL BONE, INITIAL ENCOUNTER (HCC): ICD-10-CM

## 2018-09-12 DIAGNOSIS — S50.01XA CONTUSION OF RIGHT ELBOW, INITIAL ENCOUNTER: ICD-10-CM

## 2018-09-12 PROCEDURE — 73080 X-RAY EXAM OF ELBOW: CPT | Mod: RT

## 2018-09-12 PROCEDURE — 73564 X-RAY EXAM KNEE 4 OR MORE: CPT | Mod: LT

## 2018-09-12 PROCEDURE — 700101 HCHG RX REV CODE 250: Performed by: EMERGENCY MEDICINE

## 2018-09-12 PROCEDURE — 70486 CT MAXILLOFACIAL W/O DYE: CPT

## 2018-09-12 PROCEDURE — 99284 EMERGENCY DEPT VISIT MOD MDM: CPT

## 2018-09-12 PROCEDURE — 304217 HCHG IRRIGATION SYSTEM

## 2018-09-12 PROCEDURE — 700102 HCHG RX REV CODE 250 W/ 637 OVERRIDE(OP): Performed by: EMERGENCY MEDICINE

## 2018-09-12 PROCEDURE — 302875 HCHG BANDAGE ACE 4 OR 6""

## 2018-09-12 PROCEDURE — A9270 NON-COVERED ITEM OR SERVICE: HCPCS | Performed by: EMERGENCY MEDICINE

## 2018-09-12 PROCEDURE — 70450 CT HEAD/BRAIN W/O DYE: CPT

## 2018-09-12 PROCEDURE — 304999 HCHG REPAIR-SIMPLE/INTERMED LEVEL 1

## 2018-09-12 PROCEDURE — 303747 HCHG EXTRA SUTURE

## 2018-09-12 RX ORDER — TRAMADOL HYDROCHLORIDE 50 MG/1
50 TABLET ORAL EVERY 4 HOURS PRN
Qty: 10 TAB | Refills: 0 | Status: SHIPPED | OUTPATIENT
Start: 2018-09-12 | End: 2018-09-15

## 2018-09-12 RX ORDER — ACETAMINOPHEN 325 MG/1
650 TABLET ORAL ONCE
Status: COMPLETED | OUTPATIENT
Start: 2018-09-12 | End: 2018-09-12

## 2018-09-12 RX ORDER — CEPHALEXIN 500 MG/1
500 CAPSULE ORAL 4 TIMES DAILY
Qty: 28 CAP | Refills: 0 | Status: SHIPPED | OUTPATIENT
Start: 2018-09-12 | End: 2018-09-19

## 2018-09-12 RX ORDER — SENNOSIDES 8.6 MG
650 CAPSULE ORAL EVERY 8 HOURS PRN
COMMUNITY
End: 2021-05-16

## 2018-09-12 RX ORDER — LIDOCAINE HYDROCHLORIDE AND EPINEPHRINE BITARTRATE 20; .01 MG/ML; MG/ML
10 INJECTION, SOLUTION SUBCUTANEOUS ONCE
Status: COMPLETED | OUTPATIENT
Start: 2018-09-12 | End: 2018-09-12

## 2018-09-12 RX ADMIN — ACETAMINOPHEN 650 MG: 325 TABLET, FILM COATED ORAL at 12:12

## 2018-09-12 RX ADMIN — LIDOCAINE HYDROCHLORIDE AND EPINEPHRINE 10 ML: 20; 10 INJECTION, SOLUTION INFILTRATION; PERINEURAL at 12:15

## 2018-09-12 ASSESSMENT — PAIN SCALES - GENERAL
PAINLEVEL_OUTOF10: 4
PAINLEVEL_OUTOF10: ASSUMED PAIN PRESENT

## 2018-09-12 NOTE — ED NOTES
KINDER 1 Fall Risk Assesment Tool    Risk Yes No intervention   Present to ED because of fall x  1-12 as appropriate   AGE >70 x     Altered Mental Status  x    Impaired Mobility  x    Nursing Judgement  x      Yes to any risk = High Fall Risk    1. Move patient closer to nurse's station  2. Familiarize the patient with the environment  3. Place call light with in reach and demonstrate call light use.  4. Keep patient's personal possessions within reach if appropriate.  5.Place stretcher in low position and lock brakes  6. Place yellow socks and armband on patient.  7. Place green triangle on the patient's door  8. Give patient urinal if applicable  9. Keep floor surfaces clean and dry  10. Keep patient care areas uncluttered  11. Use of lap belt or poesy vest  12. Assess patient hourly for: pain, personal needs, position change, and call light access.

## 2018-09-12 NOTE — ED TRIAGE NOTES
Chief Complaint   Patient presents with   • T-5000 GLF     Patient states she was checking on her mom's house and tripped over sprinkler in front yard having mechanical ground level fall. Patient denied LOC, denies blood thinners, does take 81mg ASA daily. Patient have laceration and hematoma to right cheek, c/o right forearm and left knee pain.

## 2018-09-12 NOTE — ED PROVIDER NOTES
"ED Provider Note    CHIEF COMPLAINT  Chief Complaint   Patient presents with   • T-5000 GLF       HPI  Maryam Gomez is a 73 y.o. female who presents to the emergency department by ambulance after mechanical ground-level fall at home.  Patient was in the front yard when she tripped over a sprinkler head.  Patient struck her head and right side of her face although denies loss of consciousness.  Patient complains of cheek swelling and laceration.  Denies altered mental status, focal weakness or vomiting.  Patient also complains of right elbow pain, worse with range of motion, left knee pain and swelling also worse with range of motion.  Patient was not ambulatory following the event and called EMS from her cell phone.  No neck pain, back pain or extremity paresthesias.    Unknown last tetanus although patient adamantly refusing any updated vaccine due to \"I have a very bad reaction, the pain and swelling in the arm is unbearable.\"    REVIEW OF SYSTEMS  See HPI for further details. All other systems are negative.     PAST MEDICAL HISTORY   has a past medical history of Anxiety; Arthritis; Cramp of limb (3/16/2012); Diastolic dysfunction (3/16/2012); DVT (deep venous thrombosis) ([facility]); Edema (3/16/2012); GERD (gastroesophageal reflux disease); Glaucoma; Heart burn; Hiatus hernia syndrome; Hypercholesteremia; Hyperlipidemia (3/16/2012); Hypertension; Hypothyroid; Indigestion; Osteopenia; Palpitations (3/16/2012); and Polio.    SOCIAL HISTORY  Social History     Social History Main Topics   • Smoking status: Former Smoker     Packs/day: 1.00     Years: 27.00     Types: Cigarettes     Quit date: 1/1/1989   • Smokeless tobacco: Never Used      Comment: Quit 1989   • Alcohol use Yes      Comment: 3 per day   • Drug use: No   • Sexual activity: Not on file       SURGICAL HISTORY   has a past surgical history that includes hysterectomy laparoscopy (1984); other (1990); tonsillectomy (1965); hip arthroplasty total " "(11/14/2012); foot surgery (1993); shoulder decompression arthroscopic (Right, 5/18/2016); shoulder arthroscopy w/ rotator cuff repair (Right, 5/18/2016); and colonoscopy (N/A, 8/4/2018).    CURRENT MEDICATIONS  Home Medications    **Home medications have not yet been reviewed for this encounter**         ALLERGIES  No Known Allergies      PHYSICAL EXAM  VITAL SIGNS: BP (!) 182/118   Pulse 67   Temp 36.7 °C (98 °F)   Resp 16   Ht 1.549 m (5' 1\")   Wt 77.1 kg (170 lb)   SpO2 100%   BMI 32.12 kg/m²   Pulse ox interpretation:I interpret this pulse ox as normal.  Constitutional: Alert in no apparent distress.  HENT: Normocephalic, right cheek swelling, small hematoma, 2 cm cutaneous laceration, not full-thickness. Bilateral external ears normal,No hemotympanum. Nose normal. No oral trauma.    Eyes: Pupils are equal and reactive, Conjunctiva normal.   Neck: No tenderness to palpation midline, no step-offs.  Normal range of motion without pain or resistance.  Lymphatic: No lymphadenopathy noted.   Cardiovascular: Regular rate and rhythm, no murmurs. Distal pulses intact.    Thorax & Lungs: Normal breath sounds, No respiratory distress. No chest tenderness or crepitus.    Abdomen: Soft, non-distended, non-tender, no palpable or pulsatile masses. No peritoneal signs. No abrasions/ecchymosis.  Skin: Warm, Dry.  No abrasions or ecchymosis.  Back: No midline thoracic or lumbar tenderness, no step-offs.    Musculoskeletal: Mild tenderness to palpation at the radial head of the right elbow.  Subtle early ecchymosis without hematoma or significant swelling.  No crepitus or deformity.  2+ radial pulse, strong .  Left knee tenderness to palpation anteriorly, notable effusion.  No crepitus or deformity.  2+ DP, sensation intact light touch distally.  Neurologic: Alert and oriented ×4.  Moves 4 extremities spontaneously despite discomfort elbow and knee as above. speech clear and cohesive.. GCS 15.  Psychiatric: Affect " normal, Judgment normal, Mood normal.     DIAGNOSTIC STUDIES / PROCEDURES    LABS  Results for orders placed or performed during the hospital encounter of 08/14/18   CBC WITH DIFFERENTIAL   Result Value Ref Range    WBC 6.1 4.8 - 10.8 K/uL    RBC 4.25 4.20 - 5.40 M/uL    Hemoglobin 13.1 12.0 - 16.0 g/dL    Hematocrit 40.6 37.0 - 47.0 %    MCV 95.5 81.4 - 97.8 fL    MCH 30.8 27.0 - 33.0 pg    MCHC 32.3 (L) 33.6 - 35.0 g/dL    RDW 47.7 35.9 - 50.0 fL    Platelet Count 270 164 - 446 K/uL    MPV 8.8 (L) 9.0 - 12.9 fL    Neutrophils-Polys 52.00 44.00 - 72.00 %    Lymphocytes 35.70 22.00 - 41.00 %    Monocytes 5.90 0.00 - 13.40 %    Eosinophils 5.10 0.00 - 6.90 %    Basophils 1.00 0.00 - 1.80 %    Immature Granulocytes 0.30 0.00 - 0.90 %    Nucleated RBC 0.00 /100 WBC    Neutrophils (Absolute) 3.16 2.00 - 7.15 K/uL    Lymphs (Absolute) 2.17 1.00 - 4.80 K/uL    Monos (Absolute) 0.36 0.00 - 0.85 K/uL    Eos (Absolute) 0.31 0.00 - 0.51 K/uL    Baso (Absolute) 0.06 0.00 - 0.12 K/uL    Immature Granulocytes (abs) 0.02 0.00 - 0.11 K/uL    NRBC (Absolute) 0.00 K/uL   TSH   Result Value Ref Range    TSH 0.320 (L) 0.380 - 5.330 uIU/mL   ESTIMATED GFR   Result Value Ref Range    GFR If African American >60 >60 mL/min/1.73 m 2    GFR If Non African American >60 >60 mL/min/1.73 m 2   BASIC METABOLIC PANEL   Result Value Ref Range    Sodium 142 135 - 145 mmol/L    Potassium 3.8 3.6 - 5.5 mmol/L    Chloride 107 96 - 112 mmol/L    Co2 25 20 - 33 mmol/L    Glucose 91 65 - 99 mg/dL    Bun 29 (H) 8 - 22 mg/dL    Creatinine 0.70 0.50 - 1.40 mg/dL    Calcium 9.5 8.5 - 10.5 mg/dL    Anion Gap 10.0 0.0 - 11.9       RADIOLOGY  CT-MAXILLOFACIAL W/O PLUS RECONS   Final Result         1.  Acute fracture of the inferior aspect of the posterior orbital floor is identified.      2.  Fracture of the lateral wall of the right orbit.      3.  Fracture lateral wall of right maxillary sinus.      4.  Fracture of the right zygomatic arch is identified.       5.  Near complete opacification of right maxillary sinus with high density fluid consistent with blood.      CT-HEAD W/O   Final Result         NO ACUTE ABNORMALITIES ARE NOTED ON CT SCAN OF THE HEAD.      Findings are consistent with atrophy.  Decreased attenuation in the periventricular white matter likely indicates microvascular ischemic disease.      DX-KNEE COMPLETE 4+ LEFT   Final Result      No evidence of fracture or dislocation.   Moderate osteoarthritis.      DX-ELBOW-COMPLETE 3+ RIGHT   Final Result      No radiographic evidence of acute traumatic injury.        PROCEDURE  LACERATION REPAIR PROCEDURE NOTE  The patient's identification was confirmed and consent was obtained.  This procedure was performed by Dr. Lewis  Site: Right cheek  Sterile procedures observed  Anesthetic used (type and amt): Lidocaine with epinephrine  Suture type/size: 5-0 nylon  Length: 2 cm  # of Sutures: 4  Technique: Simple interrupted  Complexity: Simple  Antibx ointment applied  Tetanus UTD  Site anesthetized, irrigated with NS, explored without evidence of foreign body, wound well approximated, site covered with dry, sterile dressing. Patient tolerated procedure well without complications. Instructions for care discussed verbally and patient provided with additional written instructions for homecare and f/u.      COURSE & MEDICAL DECISION MAKING  Nursing notes and vital signs were reviewed. (See chart for details)  The patients records were reviewed, history was obtained from the patient;    Evaluation demonstrates multiple nondisplaced facial fractures as described above.  The cheek laceration was repaired also as described above, with good hemostasis and approximation.  laceration was superficial/subcutaneous, no evidence for deep structure or facial nerve injury.  No hematoma.  CT head is unrevealing, patient is neurologically intact.  X-ray elbow and knee also within normal limits other than the age-appropriate  arthritis.  CMS intact distally.  Right elbow contusion as x-rays are otherwise unrevealing.  Left knee sprain, cannot exclude internal derangement, if swelling and pain persist patient is where the outpatient MRI may be needed.  Until then, Ace wrap for comfort or support.  Weightbearing as tolerated.  Patient ambulates independently in the emergency department.    3:09 PM Dr. Persaud is where the patient agreeable consultation.  He will review films at the end of this OR case.    1540 -Dr. Persaud has reviewed the films.  No indication for surgical intervention at this time.  Recommends Keflex, no nose blowing, pain control.  Patient to follow-up in his office next week as needed.    Patient is stable for discharge at this time, anticipatory guidance provided, Keflex 4 times daily for 7 days, tramadol for breakthrough pain, close follow-up is encouraged, and strict ED return instructions have been detailed. Patient is agreeable to the disposition and plan.    Patient's blood pressure was elevated in the emergency department, and has been referred to primary care for close monitoring.     reviewed, no pattern for concern.  In prescribing controlled substances to this patient, I certify that I have obtained and reviewed the medical history of Maryam Gomez. I have also made a good leyla effort to obtain applicable records from other providers who have treated the patient and no other records are available at this time.     I have conducted a physical exam and documented it. I have reviewed Ms. Gomez’s prescription history as maintained by the [place] Prescription Monitoring Program.     I have assessed the patient’s risk for abuse, dependency, and addiction using the validated Opioid Risk Tool available at https://www.mdcalc.com/gwpeyf-dwkx-sqqr-ort-narcotic-abuse.     Given the above, I believe the benefits of controlled substance therapy outweigh the risks. The reasons for prescribing controlled  substances include non-narcotic, oral analgesic alternatives have been inadequate for pain control. Accordingly, I have discussed the risk and benefits, treatment plan, and alternative therapies with the patient.       FINAL IMPRESSION  (S09.90XA) Closed head injury, initial encounter  (S02.92XA) Closed fracture of facial bone, unspecified facial bone, initial encounter (HCC)  (S01.81XA) Facial laceration, initial encounter  (S50.01XA) Contusion of right elbow, initial encounter  (S83.92XA) Sprain of left knee, unspecified ligament, initial encounter      Electronically signed by: Natalia Lewis, 9/12/2018 11:49 AM      This dictation was created using voice recognition software. The accuracy of the dictation is limited to the abilities of the software. I expect there may be some errors of grammar and possibly content. The nursing notes were reviewed and certain aspects of this information were incorporated into this note.

## 2018-09-12 NOTE — DISCHARGE INSTRUCTIONS
Follow-up with primary care this week for reevaluation, wound check, medication management and close blood pressure monitoring.  Follow-up with Dr. Persaud next week for reevaluation of facial fractures.  He may call his office, references emergency department visit, and arrange follow-up appointment.  Follow-up with orthopedics next week if knee pain or swelling persists.  Outpatient MRI or physical therapy may be indicated.  Follow-up with primary care or emergency department in 5-7 days for suture removal.    Continue any home medications as previously indicated.  Keflex 4 times daily for 1 week.    Avoid any nose blowing.    Tylenol every 4-6 hours as needed for discomfort.  Tramadol every 4-6 hours as needed for breakthrough pain.  Apply ice, 20 minutes of every hour, as needed for facial swelling or discomfort.    Keep wound clean, dry and intact.  Starting tomorrow he may cleanse gently, warm water, soap, pat dry.  Apply antibiotic ointment twice daily.  Keep covered while active.  Increased swelling or discoloration/bruising as expected with this injury.    Ace wrap as needed for comfort and support to left knee.  Weightbearing as tolerated.    Return to the emergency department for headache, altered mental status, facial pain or swelling, bleeding or wound infection, persistent or worsening knee pain or other new concerns.    Facial Fracture  A facial fracture is a break in one of the bones of your face.  HOME CARE INSTRUCTIONS   · Protect the injured part of your face until it is healed.  · Do not participate in activities which give chance for re-injury until your doctor approves.  · Gently wash and dry your face.  · Wear head and facial protection while riding a bicycle, motorcycle, or snowmobile.  SEEK MEDICAL CARE IF:   · An oral temperature above 102° F (38.9° C) develops.  · You have severe headaches or notice changes in your vision.  · You have new numbness or tingling in your face.  · You develop  nausea (feeling sick to your stomach), vomiting or a stiff neck.  SEEK IMMEDIATE MEDICAL CARE IF:   · You develop difficulty seeing or experience double vision.  · You become dizzy, lightheaded, or faint.  · You develop trouble speaking, breathing, or swallowing.  · You have a watery discharge from your nose or ear.  MAKE SURE YOU:   · Understand these instructions.  · Will watch your condition.  · Will get help right away if you are not doing well or get worse.  Document Released: 12/18/2006 Document Revised: 03/11/2013 Document Reviewed: 08/06/2009  [employer]® Patient Information ©2014 [employer].    Facial Laceration  A facial laceration is a cut on the face. These injuries can be painful and cause bleeding. Some cuts may need to be closed with stitches (sutures), skin adhesive strips, or wound glue. Cuts usually heal quickly but can leave a scar. It can take 1-2 years for the scar to go away completely.  Follow these instructions at home:  · Only take medicines as told by your doctor.  · Follow your doctor's instructions for wound care.  For Stitches:  · Keep the cut clean and dry.  · If you have a bandage (dressing), change it at least once a day. Change the bandage if it gets wet or dirty, or as told by your doctor.  · Wash the cut with soap and water 2 times a day. Rinse the cut with water. Pat it dry with a clean towel.  · Put a thin layer of medicated cream on the cut as told by your doctor.  · You may shower after the first 24 hours. Do not soak the cut in water until the stitches are removed.  · Have your stitches removed as told by your doctor.  · Do not wear any makeup until a few days after your stitches are removed.  For Skin Adhesive Strips:  · Keep the cut clean and dry.  · Do not get the strips wet. You may take a bath, but be careful to keep the cut dry.  · If the cut gets wet, pat it dry with a clean towel.  · The strips will fall off on their own. Do not remove the strips that are still stuck  to the cut.  For Wound Glue:  · You may shower or take baths. Do not soak or scrub the cut. Do not swim. Avoid heavy sweating until the glue falls off on its own. After a shower or bath, pat the cut dry with a clean towel.  · Do not put medicine or makeup on your cut until the glue falls off.  · If you have a bandage, do not put tape over the glue.  · Avoid lots of sunlight or tanning lamps until the glue falls off.  · The glue will fall off on its own in 5-10 days. Do not pick at the glue.  After Healing:  · Put sunscreen on the cut for the first year to reduce your scar.  Contact a doctor if:  · You have a fever.  Get help right away if:  · Your cut area gets red, painful, or puffy (swollen).  · You see a yellowish-white fluid (pus) coming from the cut.  This information is not intended to replace advice given to you by your health care provider. Make sure you discuss any questions you have with your health care provider.  Document Released: 06/05/2009 Document Revised: 05/25/2017 Document Reviewed: 07/31/2014  [employer] Interactive Patient Education © 2017 [employer]    Knee Sprain  A knee sprain is a stretch or tear in a knee ligament. Knee ligaments are bands of tissue that connect bones in the knee to each other.  Follow these instructions at home:  If you have a splint or brace:  · Wear the splint or brace as told by your doctor. Remove it only as told by your doctor.  · Loosen the splint or brace if your toes tingle, get numb, or turn cold and blue.  · Keep the splint or brace clean.  · If the splint or brace is not waterproof:  ¨ Do not let it get wet.  ¨ Cover it with a watertight covering when you take a bath or a shower.  If you have a cast:  · Do not stick anything inside the cast to scratch your skin.  · Check the skin around the cast every day. Tell your doctor about any concerns.  · You may put lotion on dry skin around the edges of the cast. Do not put lotion on the skin underneath the  cast.  · Keep the cast clean.  · If the cast is not waterproof:  ¨ Do not let it get wet.  ¨ Cover it with a watertight covering when you take a bath or a shower.  Managing pain, stiffness, and swelling  · Gently move your toes often to avoid stiffness and to lessen swelling.  · Raise (elevate) the injured area above the level of your heart while you are sitting or lying down.  · Take over-the-counter and prescription medicines only as told by your doctor.  · If directed, put ice on the injured area.  ¨ If you have a removable splint or brace, remove it as told by your doctor.  ¨ Put ice in a plastic bag.  ¨ Place a towel between your skin and the bag or between your cast and the bag.  ¨ Leave the ice on for 20 minutes, 2-3 times a day.  General instructions  · Do exercises as told by your doctor.  · Keep all follow-up visits as told by your doctor. This is important.  Contact a doctor if:  · You have pain that gets worse.  · The cast, brace, or splint does not fit right.  · The cast, brace, or splint gets damaged.  Get help right away if:  · You cannot lean on your knee to stand or walk.  · You cannot move the injured area.  · You knee rose or you have pain after you walk only a few steps.  · You have very bad pain, swelling, or numbness below the cast, brace, or splint.  Summary  · A knee sprain is a stretch or tear in a band (ligament) that connects your knee bones to each other.  · You may need to wear a splint, brace, or cast to help your knee get better.  · Contact your doctor if you have very bad pain, swelling, or numbness, or if you cannot walk.  This information is not intended to replace advice given to you by your health care provider. Make sure you discuss any questions you have with your health care provider.  Document Released: 12/06/2010 Document Revised: 09/05/2017 Document Reviewed: 09/05/2017  [employer] Interactive Patient Education © 2017 [employer]

## 2018-09-12 NOTE — ED NOTES
Patient discharged home with self.  Patient verbalized discharge instructions.  All questions answered to patient satisfaction.  Patient condition stable.  Patient discharged via wheelchair.

## 2019-09-09 ENCOUNTER — HOSPITAL ENCOUNTER (OUTPATIENT)
Dept: RADIOLOGY | Facility: MEDICAL CENTER | Age: 74
End: 2019-09-09
Attending: FAMILY MEDICINE
Payer: MEDICARE

## 2019-09-09 DIAGNOSIS — Z12.31 SCREENING MAMMOGRAM, ENCOUNTER FOR: ICD-10-CM

## 2019-09-09 PROCEDURE — 77063 BREAST TOMOSYNTHESIS BI: CPT

## 2019-11-04 ENCOUNTER — HOSPITAL ENCOUNTER (OUTPATIENT)
Dept: LAB | Facility: MEDICAL CENTER | Age: 74
End: 2019-11-04
Attending: FAMILY MEDICINE
Payer: MEDICARE

## 2019-11-04 LAB
ALBUMIN SERPL BCP-MCNC: 4.6 G/DL (ref 3.2–4.9)
ALBUMIN/GLOB SERPL: 2 G/DL
ALP SERPL-CCNC: 68 U/L (ref 30–99)
ALT SERPL-CCNC: 25 U/L (ref 2–50)
ANION GAP SERPL CALC-SCNC: 10 MMOL/L (ref 0–11.9)
APPEARANCE UR: CLEAR
AST SERPL-CCNC: 24 U/L (ref 12–45)
BASOPHILS # BLD AUTO: 1.2 % (ref 0–1.8)
BASOPHILS # BLD: 0.06 K/UL (ref 0–0.12)
BILIRUB SERPL-MCNC: 0.8 MG/DL (ref 0.1–1.5)
BILIRUB UR QL STRIP.AUTO: NEGATIVE
BUN SERPL-MCNC: 18 MG/DL (ref 8–22)
CALCIUM SERPL-MCNC: 9.1 MG/DL (ref 8.5–10.5)
CHLORIDE SERPL-SCNC: 107 MMOL/L (ref 96–112)
CHOLEST SERPL-MCNC: 172 MG/DL (ref 100–199)
CO2 SERPL-SCNC: 24 MMOL/L (ref 20–33)
COLOR UR: YELLOW
CREAT SERPL-MCNC: 0.59 MG/DL (ref 0.5–1.4)
EOSINOPHIL # BLD AUTO: 0.54 K/UL (ref 0–0.51)
EOSINOPHIL NFR BLD: 10.9 % (ref 0–6.9)
ERYTHROCYTE [DISTWIDTH] IN BLOOD BY AUTOMATED COUNT: 47.8 FL (ref 35.9–50)
FASTING STATUS PATIENT QL REPORTED: NORMAL
GLOBULIN SER CALC-MCNC: 2.3 G/DL (ref 1.9–3.5)
GLUCOSE SERPL-MCNC: 93 MG/DL (ref 65–99)
GLUCOSE UR STRIP.AUTO-MCNC: NEGATIVE MG/DL
HCT VFR BLD AUTO: 43.3 % (ref 37–47)
HDLC SERPL-MCNC: 46 MG/DL
HGB BLD-MCNC: 13.9 G/DL (ref 12–16)
IMM GRANULOCYTES # BLD AUTO: 0.01 K/UL (ref 0–0.11)
IMM GRANULOCYTES NFR BLD AUTO: 0.2 % (ref 0–0.9)
KETONES UR STRIP.AUTO-MCNC: NEGATIVE MG/DL
LDLC SERPL CALC-MCNC: 59 MG/DL
LEUKOCYTE ESTERASE UR QL STRIP.AUTO: NEGATIVE
LYMPHOCYTES # BLD AUTO: 1.78 K/UL (ref 1–4.8)
LYMPHOCYTES NFR BLD: 36 % (ref 22–41)
MCH RBC QN AUTO: 31.9 PG (ref 27–33)
MCHC RBC AUTO-ENTMCNC: 32.1 G/DL (ref 33.6–35)
MCV RBC AUTO: 99.3 FL (ref 81.4–97.8)
MICRO URNS: NORMAL
MONOCYTES # BLD AUTO: 0.43 K/UL (ref 0–0.85)
MONOCYTES NFR BLD AUTO: 8.7 % (ref 0–13.4)
NEUTROPHILS # BLD AUTO: 2.13 K/UL (ref 2–7.15)
NEUTROPHILS NFR BLD: 43 % (ref 44–72)
NITRITE UR QL STRIP.AUTO: NEGATIVE
NRBC # BLD AUTO: 0 K/UL
NRBC BLD-RTO: 0 /100 WBC
PH UR STRIP.AUTO: 5.5 [PH] (ref 5–8)
PLATELET # BLD AUTO: 200 K/UL (ref 164–446)
PMV BLD AUTO: 8.9 FL (ref 9–12.9)
POTASSIUM SERPL-SCNC: 3.8 MMOL/L (ref 3.6–5.5)
PROT SERPL-MCNC: 6.9 G/DL (ref 6–8.2)
PROT UR QL STRIP: NEGATIVE MG/DL
RBC # BLD AUTO: 4.36 M/UL (ref 4.2–5.4)
RBC UR QL AUTO: NEGATIVE
SODIUM SERPL-SCNC: 141 MMOL/L (ref 135–145)
SP GR UR STRIP.AUTO: 1.02
TRIGL SERPL-MCNC: 334 MG/DL (ref 0–149)
TSH SERPL DL<=0.005 MIU/L-ACNC: 0.08 UIU/ML (ref 0.38–5.33)
UROBILINOGEN UR STRIP.AUTO-MCNC: 0.2 MG/DL
WBC # BLD AUTO: 5 K/UL (ref 4.8–10.8)

## 2019-11-04 PROCEDURE — 36415 COLL VENOUS BLD VENIPUNCTURE: CPT

## 2019-11-04 PROCEDURE — 80061 LIPID PANEL: CPT

## 2019-11-04 PROCEDURE — 80053 COMPREHEN METABOLIC PANEL: CPT

## 2019-11-04 PROCEDURE — 84443 ASSAY THYROID STIM HORMONE: CPT

## 2019-11-04 PROCEDURE — 81003 URINALYSIS AUTO W/O SCOPE: CPT

## 2019-11-04 PROCEDURE — 85025 COMPLETE CBC W/AUTO DIFF WBC: CPT

## 2019-12-30 ENCOUNTER — HOSPITAL ENCOUNTER (OUTPATIENT)
Dept: RADIOLOGY | Facility: MEDICAL CENTER | Age: 74
End: 2019-12-30
Attending: FAMILY MEDICINE
Payer: MEDICARE

## 2019-12-30 DIAGNOSIS — N95.8 POSTARTIFICIAL MENOPAUSAL SYNDROME: ICD-10-CM

## 2019-12-30 PROCEDURE — 77080 DXA BONE DENSITY AXIAL: CPT

## 2020-01-14 ENCOUNTER — HOSPITAL ENCOUNTER (OUTPATIENT)
Dept: LAB | Facility: MEDICAL CENTER | Age: 75
End: 2020-01-14
Attending: FAMILY MEDICINE
Payer: MEDICARE

## 2020-01-14 LAB
CHOLEST SERPL-MCNC: 152 MG/DL (ref 100–199)
FASTING STATUS PATIENT QL REPORTED: NORMAL
HDLC SERPL-MCNC: 56 MG/DL
LDLC SERPL CALC-MCNC: 50 MG/DL
TRIGL SERPL-MCNC: 228 MG/DL (ref 0–149)
TSH SERPL DL<=0.005 MIU/L-ACNC: 1.38 UIU/ML (ref 0.38–5.33)

## 2020-01-14 PROCEDURE — 84443 ASSAY THYROID STIM HORMONE: CPT

## 2020-01-14 PROCEDURE — 80061 LIPID PANEL: CPT

## 2020-01-14 PROCEDURE — 36415 COLL VENOUS BLD VENIPUNCTURE: CPT

## 2020-09-14 ENCOUNTER — APPOINTMENT (OUTPATIENT)
Dept: RADIOLOGY | Facility: MEDICAL CENTER | Age: 75
End: 2020-09-14
Attending: FAMILY MEDICINE
Payer: COMMERCIAL

## 2020-09-28 ENCOUNTER — APPOINTMENT (OUTPATIENT)
Dept: RADIOLOGY | Facility: MEDICAL CENTER | Age: 75
End: 2020-09-28
Attending: FAMILY MEDICINE
Payer: COMMERCIAL

## 2020-10-09 ENCOUNTER — HOSPITAL ENCOUNTER (OUTPATIENT)
Dept: LAB | Facility: MEDICAL CENTER | Age: 75
End: 2020-10-09
Attending: FAMILY MEDICINE
Payer: MEDICARE

## 2020-10-09 LAB
ALBUMIN SERPL BCP-MCNC: 4.3 G/DL (ref 3.2–4.9)
ALBUMIN/GLOB SERPL: 2.3 G/DL
ALP SERPL-CCNC: 64 U/L (ref 30–99)
ALT SERPL-CCNC: 20 U/L (ref 2–50)
ANION GAP SERPL CALC-SCNC: 9 MMOL/L (ref 7–16)
APPEARANCE UR: CLEAR
AST SERPL-CCNC: 15 U/L (ref 12–45)
BASOPHILS # BLD AUTO: 1 % (ref 0–1.8)
BASOPHILS # BLD: 0.06 K/UL (ref 0–0.12)
BILIRUB SERPL-MCNC: 0.4 MG/DL (ref 0.1–1.5)
BILIRUB UR QL STRIP.AUTO: NEGATIVE
BUN SERPL-MCNC: 23 MG/DL (ref 8–22)
CALCIUM SERPL-MCNC: 8.9 MG/DL (ref 8.5–10.5)
CHLORIDE SERPL-SCNC: 105 MMOL/L (ref 96–112)
CHOLEST SERPL-MCNC: 162 MG/DL (ref 100–199)
CO2 SERPL-SCNC: 25 MMOL/L (ref 20–33)
COLOR UR: YELLOW
CREAT SERPL-MCNC: 0.56 MG/DL (ref 0.5–1.4)
EOSINOPHIL # BLD AUTO: 0.44 K/UL (ref 0–0.51)
EOSINOPHIL NFR BLD: 7.2 % (ref 0–6.9)
ERYTHROCYTE [DISTWIDTH] IN BLOOD BY AUTOMATED COUNT: 52.2 FL (ref 35.9–50)
EST. AVERAGE GLUCOSE BLD GHB EST-MCNC: 117 MG/DL
FASTING STATUS PATIENT QL REPORTED: NORMAL
GLOBULIN SER CALC-MCNC: 1.9 G/DL (ref 1.9–3.5)
GLUCOSE SERPL-MCNC: 95 MG/DL (ref 65–99)
GLUCOSE UR STRIP.AUTO-MCNC: NEGATIVE MG/DL
HBA1C MFR BLD: 5.7 % (ref 0–5.6)
HCT VFR BLD AUTO: 41.9 % (ref 37–47)
HDLC SERPL-MCNC: 62 MG/DL
HGB BLD-MCNC: 13.5 G/DL (ref 12–16)
IMM GRANULOCYTES # BLD AUTO: 0.03 K/UL (ref 0–0.11)
IMM GRANULOCYTES NFR BLD AUTO: 0.5 % (ref 0–0.9)
KETONES UR STRIP.AUTO-MCNC: NEGATIVE MG/DL
LDLC SERPL CALC-MCNC: 67 MG/DL
LEUKOCYTE ESTERASE UR QL STRIP.AUTO: NEGATIVE
LYMPHOCYTES # BLD AUTO: 2.09 K/UL (ref 1–4.8)
LYMPHOCYTES NFR BLD: 34.2 % (ref 22–41)
MCH RBC QN AUTO: 31.4 PG (ref 27–33)
MCHC RBC AUTO-ENTMCNC: 32.2 G/DL (ref 33.6–35)
MCV RBC AUTO: 97.4 FL (ref 81.4–97.8)
MICRO URNS: NORMAL
MONOCYTES # BLD AUTO: 0.58 K/UL (ref 0–0.85)
MONOCYTES NFR BLD AUTO: 9.5 % (ref 0–13.4)
NEUTROPHILS # BLD AUTO: 2.91 K/UL (ref 2–7.15)
NEUTROPHILS NFR BLD: 47.6 % (ref 44–72)
NITRITE UR QL STRIP.AUTO: NEGATIVE
NRBC # BLD AUTO: 0 K/UL
NRBC BLD-RTO: 0 /100 WBC
PH UR STRIP.AUTO: 6 [PH] (ref 5–8)
PLATELET # BLD AUTO: 185 K/UL (ref 164–446)
PMV BLD AUTO: 8.6 FL (ref 9–12.9)
POTASSIUM SERPL-SCNC: 4.2 MMOL/L (ref 3.6–5.5)
PROT SERPL-MCNC: 6.2 G/DL (ref 6–8.2)
PROT UR QL STRIP: NEGATIVE MG/DL
RBC # BLD AUTO: 4.3 M/UL (ref 4.2–5.4)
RBC UR QL AUTO: NEGATIVE
SODIUM SERPL-SCNC: 139 MMOL/L (ref 135–145)
SP GR UR STRIP.AUTO: 1.02
TRIGL SERPL-MCNC: 165 MG/DL (ref 0–149)
TSH SERPL DL<=0.005 MIU/L-ACNC: 0.7 UIU/ML (ref 0.38–5.33)
UROBILINOGEN UR STRIP.AUTO-MCNC: 0.2 MG/DL
WBC # BLD AUTO: 6.1 K/UL (ref 4.8–10.8)

## 2020-10-09 PROCEDURE — 80061 LIPID PANEL: CPT

## 2020-10-09 PROCEDURE — 85025 COMPLETE CBC W/AUTO DIFF WBC: CPT

## 2020-10-09 PROCEDURE — 36415 COLL VENOUS BLD VENIPUNCTURE: CPT

## 2020-10-09 PROCEDURE — 84443 ASSAY THYROID STIM HORMONE: CPT

## 2020-10-09 PROCEDURE — 81003 URINALYSIS AUTO W/O SCOPE: CPT

## 2020-10-09 PROCEDURE — 83036 HEMOGLOBIN GLYCOSYLATED A1C: CPT

## 2020-10-09 PROCEDURE — 80053 COMPREHEN METABOLIC PANEL: CPT

## 2021-01-12 ENCOUNTER — APPOINTMENT (OUTPATIENT)
Dept: RADIOLOGY | Facility: MEDICAL CENTER | Age: 76
End: 2021-01-12
Attending: EMERGENCY MEDICINE
Payer: MEDICARE

## 2021-01-12 ENCOUNTER — HOSPITAL ENCOUNTER (EMERGENCY)
Facility: MEDICAL CENTER | Age: 76
End: 2021-01-12
Attending: EMERGENCY MEDICINE
Payer: MEDICARE

## 2021-01-12 VITALS
RESPIRATION RATE: 15 BRPM | HEART RATE: 85 BPM | WEIGHT: 175 LBS | HEIGHT: 61 IN | BODY MASS INDEX: 33.04 KG/M2 | SYSTOLIC BLOOD PRESSURE: 124 MMHG | OXYGEN SATURATION: 97 % | TEMPERATURE: 97.6 F | DIASTOLIC BLOOD PRESSURE: 69 MMHG

## 2021-01-12 DIAGNOSIS — S73.005A DISLOCATION OF LEFT HIP, INITIAL ENCOUNTER (HCC): ICD-10-CM

## 2021-01-12 PROCEDURE — 73501 X-RAY EXAM HIP UNI 1 VIEW: CPT | Mod: LT

## 2021-01-12 PROCEDURE — 72170 X-RAY EXAM OF PELVIS: CPT

## 2021-01-12 PROCEDURE — 700111 HCHG RX REV CODE 636 W/ 250 OVERRIDE (IP): Performed by: EMERGENCY MEDICINE

## 2021-01-12 PROCEDURE — 99285 EMERGENCY DEPT VISIT HI MDM: CPT

## 2021-01-12 PROCEDURE — 94799 UNLISTED PULMONARY SVC/PX: CPT

## 2021-01-12 PROCEDURE — 99152 MOD SED SAME PHYS/QHP 5/>YRS: CPT

## 2021-01-12 PROCEDURE — 27250 TREAT HIP DISLOCATION: CPT

## 2021-01-12 RX ORDER — PROPOFOL 10 MG/ML
INJECTION, EMULSION INTRAVENOUS
Status: COMPLETED | OUTPATIENT
Start: 2021-01-12 | End: 2021-01-12

## 2021-01-12 RX ADMIN — PROPOFOL 20 MG: 10 INJECTION, EMULSION INTRAVENOUS at 20:07

## 2021-01-12 RX ADMIN — PROPOFOL 20 MG: 10 INJECTION, EMULSION INTRAVENOUS at 20:06

## 2021-01-12 RX ADMIN — PROPOFOL 40 MG: 10 INJECTION, EMULSION INTRAVENOUS at 20:05

## 2021-01-12 ASSESSMENT — FIBROSIS 4 INDEX: FIB4 SCORE: 1.4

## 2021-01-13 DIAGNOSIS — Z23 NEED FOR VACCINATION: ICD-10-CM

## 2021-01-13 NOTE — ED NOTES
Assist RN: First contact with pt. Pt discharged home, this RN called pt's neighbor Eder for ride home.Pt ambulates self but wheeled to exit for comfort. VS stable. Pt verbalized understanding discharge instructions. All belongings with pt.

## 2021-01-13 NOTE — ED NOTES
ERP Dr. Nagel, RN, respiratory, and 2 ED tech ar bedside for conscious sedation for reduction of left hip, consent signed. Vitals taken, 2004 time out called by ERP, 2005 4 propofol, pt remained awake and talking, 2006 2 propofol, 2007 2 propofol, ERP able to place hip back into place. 2015 ERP left bedside, pt A&O x4, speaking in full sentence, O2 weened off.

## 2021-01-13 NOTE — ED TRIAGE NOTES
"Chief Complaint   Patient presents with   • T-5000 GLF     Pt slipped and fell onto L hip. No head or neck pain. No LOC.   • Hip Injury     L side. Shortening and internal rotation noted. + pulses.     /64   Pulse 68   Temp 36.6 °C (97.8 °F)   Resp 16   Ht 1.549 m (5' 1\")   Wt 79.4 kg (175 lb)   SpO2 96%   Breastfeeding No   BMI 33.07 kg/m²     Pt brought in by [facility] from home, mask in place. Placed in room [address] 6. Pt on monitors, all alarms audible. Chart flagged for ERP to see.    PIV established, 200 mcg fentanyl pta.  "
pt likely with vasovagal syncope.  pt nontoxic, well appearing.  work up negative for anything acute.  pt dc with outpatient neuro follow up.  pt has history of anemia.  Patient feeling better.  Pt dc with outpatient follow up.  Pt understands importance of outpatient follow up.  Pt given strict return precautions.

## 2021-01-13 NOTE — ED PROVIDER NOTES
ED Provider Note    CHIEF COMPLAINT  Chief Complaint   Patient presents with   • T-5000 GLF     Pt slipped and fell onto L hip. No head or neck pain. No LOC.   • Hip Injury     L side. Shortening and internal rotation noted. + pulses.       HPI  Maryam Gomez is a 75 y.o. female who presents to the emergency department complaining of left hip pain.  The patient had drank 3 glasses of wine with dinner had and she slipped while trying to bend over to  a popsicle and felt her hip pop out.  She had pain in the hip since that time.  No numbness or tingling.  She did not fall to the ground.  Pain is worsened with movement.  Denies any other injuries or complaints.  Is a previous dislocation of prosthetic left hip.  Feels like is again dislocated.    REVIEW OF SYSTEMS  See HPI for further details. All other systems are negative.    PAST MEDICAL HISTORY  Past Medical History:   Diagnosis Date   • Anxiety    • Arthritis     osteoarthritis   • Cramp of limb 3/16/2012   • Diastolic dysfunction 3/16/2012   • DVT (deep venous thrombosis) (HCC)     after a fall with 6mo coumadin   • Edema 3/16/2012   • GERD (gastroesophageal reflux disease)    • Glaucoma    • Heart burn     right shoulder   • Hiatus hernia syndrome    • Hypercholesteremia    • Hyperlipidemia 3/16/2012   • Hypertension    • Hypothyroid    • Indigestion    • Osteopenia    • Palpitations 3/16/2012   • Polio        FAMILY HISTORY  Family History   Problem Relation Age of Onset   • Heart Attack Neg Hx    • Heart Disease Neg Hx    • Heart Failure Neg Hx        SOCIAL HISTORY  Social History     Socioeconomic History   • Marital status:      Spouse name: Not on file   • Number of children: Not on file   • Years of education: Not on file   • Highest education level: Not on file   Occupational History   • Not on file   Social Needs   • Financial resource strain: Not on file   • Food insecurity     Worry: Not on file     Inability: Not on file   •  Transportation needs     Medical: Not on file     Non-medical: Not on file   Tobacco Use   • Smoking status: Former Smoker     Packs/day: 1.00     Years: 27.00     Pack years: 27.00     Types: Cigarettes     Quit date: 1989     Years since quittin.0   • Smokeless tobacco: Never Used   • Tobacco comment: Quit    Substance and Sexual Activity   • Alcohol use: Yes     Comment: 3 per day   • Drug use: No   • Sexual activity: Not on file   Lifestyle   • Physical activity     Days per week: Not on file     Minutes per session: Not on file   • Stress: Not on file   Relationships   • Social connections     Talks on phone: Not on file     Gets together: Not on file     Attends [employer] service: Not on file     Active member of club or organization: Not on file     Attends meetings of clubs or organizations: Not on file     Relationship status: Not on file   • Intimate partner violence     Fear of current or ex partner: Not on file     Emotionally abused: Not on file     Physically abused: Not on file     Forced sexual activity: Not on file   Other Topics Concern   • Not on file   Social History Narrative   • Not on file       SURGICAL HISTORY  Past Surgical History:   Procedure Laterality Date   • COLONOSCOPY N/A 2018    Procedure: COLONOSCOPY;  Surgeon: Lucy Carter M.D.;  Location: [facility];  Service: Gastroenterology   • SHOULDER DECOMPRESSION ARTHROSCOPIC Right 2016    Procedure: SHOULDER DECOMPRESSION ARTHROSCOPIC - SUBACROMIAL;  Surgeon: Joseph Ricketts M.D.;  Location: [facility];  Service:    • SHOULDER ARTHROSCOPY W/ ROTATOR CUFF REPAIR Right 2016    Procedure: SHOULDER ARTHROSCOPY W/ ROTATOR CUFF REPAIR ;  Surgeon: Joseph Ricketts M.D.;  Location: [facility];  Service:    • HIP ARTHROPLASTY TOTAL  2012    Performed by Joseph Ricketts M.D. at [facility]   • FOOT SURGERY     • OTHER      nose   •  "HYSTERECTOMY LAPAROSCOPY  1984   • TONSILLECTOMY  1965       CURRENT MEDICATIONS  Home Medications     Reviewed by Mely Mendez R.N. (Registered Nurse) on 01/12/21 at 1754  Med List Status: Not Addressed   Medication Last Dose Status   acetaminophen (TYLENOL 8 HOUR) 650 MG CR tablet  Active   aspirin EC (ECOTRIN) 81 MG Tablet Delayed Response  Active   atorvastatin (LIPITOR) 20 MG Tab  Active   B Complex Vitamins (VITAMIN B COMPLEX PO)  Active   Calcium-Magnesium-Vitamin D (CITRACAL CALCIUM+D PO)  Active   carvedilol (COREG) 6.25 MG Tab  Active   Glucosamine-Chondroitin (COSAMIN DS PO)  Active   Lactase (LACTAID PO)  Active   lansoprazole (PREVACID) 30 MG CAPSULE DELAYED RELEASE  Active   latanoprost (XALATAN) 0.005 % SOLN  Active   levothyroxine (SYNTHROID) 100 MCG TABS  Active   lisinopril (PRINIVIL) 10 MG TABS  Active   LUTEIN  Active   Multiple Vitamins-Minerals (MULTI COMPLETE PO)  Active   Omega-3 Fatty Acids (FISH OIL) 1200 MG Cap  Active   raNITidine (ZANTAC) 150 MG Tab  Active                ALLERGIES  No Known Allergies    PHYSICAL EXAM  VITAL SIGNS: /58   Pulse 69   Temp 36.6 °C (97.8 °F)   Resp 13   Ht 1.549 m (5' 1\")   Wt 79.4 kg (175 lb)   SpO2 96%   Breastfeeding No   BMI 33.07 kg/m²    Constitutional: Awake alert nontoxic mild distress  HENT: Normocephalic, Atraumatic, Bilateral external ears normal,    Eyes: PERRL, EOMI, Conjunctiva normal, No discharge.   Neck: Normal range of motion,  Cardiovascular: Normal heart rate, Normal rhythm, No murmurs,  Thorax & Lungs: Normal breath sounds, No respiratory distress,  Abdomen: Bowel sounds normal, Soft, No tenderness,   Musculoskeletal: Good range of motion in all major joints.  Left hip is shortened and internally rotated.  Normal neurovascular exam good pulses and sensation.  She has immediate pain with movement of the left hip.  Neurologic: Alert, no focal deficits  Psychiatric: Affect " normal    RADIOLOGY/PROCEDURES  DX-HIP-UNILATERAL-WITH PELVIS-1 VIEW LEFT   Final Result      Dislocation of the femoral component of the left hip arthroplasty.      DX-HIP-COMPLETE - UNILATERAL 2+ LEFT    (Results Pending)         Conscious Sedation Procedure Note    Indication: hip dislocation    Consent: I have discussed with the patient and/or the patient representative the indication, alternatives, and the possible risks and/or complications of the planned procedure and the anesthesia methods. The patient and/or patient representative appear to understand and agree to proceed.    Physician Involvement: The attending physician was present and supervising this procedure.    Pre-Sedation Documentation and Exam: Vital signs have been reviewed (see flow sheet for vitals).  Airway Assessment: dentition not prohibitive  f3  Prior History of Anesthesia Complications: none    ASA Classification: Class 2 - A normal healthy patient with mild systemic disease    Sedation/ Anesthesia Plan: intravenous sedation    Medications Used: propofol intravenously    Monitoring and Safety: The patient was placed on a cardiac monitor and vital signs, pulse oximetry and level of consciousness were continuously evaluated throughout the procedure. The patient was closely monitored until recovery from the medications was complete and the patient had returned to baseline status. Respiratory therapy was on standby at all times during the procedure.      (The following sections must be completed)  Post-Sedation Vital Signs: Vital signs were reviewed and were stable after the procedure (see flow sheet for vitals)            Intraservice Time: 16 minutes    Post-Sedation Exam: Lungs: clear           Complications: none    I provided both the sedation and procedure, a nurse was present at the bedside for the entire procedure.         COURSE & MEDICAL DECISION MAKING  Pertinent Labs & Imaging studies reviewed. (See chart for details)    The  patient presents emergency department for evaluation of left hip pain after a traumatic dislocation of the left hip.  X-ray confirms a diagnosis of a dislocation.    Informed consent was obtained for conscious sedation the patient was successfully sedated and the hip was reduced without collected.  Once the patient was awake with sedation she is reexamined shows normal neurovascular she feels well.  Knee immobilizer was placed and she has a normal repeat neurovascular examination.    Subjectively she feels much better.  Repeat x-ray will be obtained.  If this shows no fracture and it shows a reduction patient will be discharged home.      She is comfortable with this plan peer we'll keep her till her sedation is appropriately metabolized and she has a stable gait.  She states she is a walker at home but she does not like to use it.    The patient has a follow-up with her doctor and orthopedic surgeon.  She return for pain numbness or other concerns.  Questions are answered she is agreeable to plan.       Baltazar Rojas M.D.  [address]  [address]  [facility] [address]  [phone]          Sina Velasco M.D.  [address] [facility] [address]  [phone]              FINAL IMPRESSION  1. Dislocation of left hip, initial encounter ([facility])     2 conscious sedation with 15 minutes of bedside care time.    2.   Postreduction x-ray is unremarkable         Electronically signed by: Galileo Nagel M.D., 1/12/2021 6:53 PM  s

## 2021-01-13 NOTE — DISCHARGE INSTRUCTIONS
Use knee immobilizer to keep you from bending your knee.  Use your walker.  Return to emerge department for any more pain, or other concerns.  Follow-up with your doctor.  Follow-up with your orthopedic doctor.

## 2021-01-13 NOTE — RESPIRATORY CARE
Conscious Sedation Respiratory Update       O2 (LPM): 2 (01/12/21 2016)  Etco2: 24-33

## 2021-01-15 ENCOUNTER — NURSE TRIAGE (OUTPATIENT)
Dept: HEALTH INFORMATION MANAGEMENT | Facility: OTHER | Age: 76
End: 2021-01-15

## 2021-01-15 NOTE — TELEPHONE ENCOUNTER
"Pt to follow up with orthoped surgeon.  She has called to determine when she will be able to take the hip brace off.  She has difficulty with getting the brace back on after taking it off to shower. Instructed pt to follow up with ortho or pcp to determine next steps .     Reason for Disposition  • Minor hip injury    Additional Information  • Negative: Major bleeding (actively dripping or spurting) that can't be stopped  • Negative: Bullet, stabbed by knife or other serious penetrating wound  • Negative: Looks like a dislocated joint (crooked or deformed)  • Negative: Can't stand (bear weight) or walk  • Negative: Sounds like a life-threatening emergency to the triager  • Negative: Wound looks infected  • Negative: Puncture wound of hip area  • Negative: SEVERE pain (e.g., excruciating)  • Negative: Skin is split open or gaping (length > 1/2 inch or 12 mm)  • Negative: Bleeding won't stop after 10 minutes of direct pressure (using correct technique)  • Negative: Dirt in the wound and not removed after 15 minutes of scrubbing  • Negative: Sounds like a serious injury to the triager  • Negative: Looks infected (e.g., spreading redness, pus, red streak)  • Negative: No prior tetanus shots (or is not fully vaccinated) and any wound (e.g., cut or scrape)  • Negative: Patient wants to be seen  • Negative: Injury interferes with work or school  • Negative: Large swelling or bruise and size > palm of person's hand  • Negative: High-risk adult (e.g., age > 60, osteoporosis, chronic steroid use)  • Negative: Suspicious history for the injury  • Negative: Last tetanus shot > 5 years ago and DIRTY cut or scrape  • Negative: Last tetanus shot >10 years ago and CLEAN cut or scrape  • Negative: Injury and pain has not improved after 3 days  • Negative: Injury is still painful or swollen after 2 weeks    Answer Assessment - Initial Assessment Questions  1. MECHANISM: \"How did the injury happen?\" (e.g., twisting injury, direct " "blow)       Pt dislocated hip and has been seen in ED  2. ONSET: \"When did the injury happen?\" (Minutes or hours ago)       01/12  3. LOCATION: \"Where is the injury located?\"     LEFT  4. APPEARANCE of INJURY: \"What does the injury look like?\"  (e.g., deformity of leg)      Wearing brace.  Seen in ed 01/12  5. SEVERITY: \"Can you put weight on that leg?\" \"Can you walk?\"       no  6. SIZE: For cuts, bruises, or swelling, ask: \"How large is it?\" (e.g., inches or centimeters;  entire joint)       no  7. PAIN: \"Is there pain?\" If so, ask: \"How bad is the pain?\"  (e.g., Scale 1-10; or mild, moderate, severe)      no  8. TETANUS: For any breaks in the skin, ask: \"When was the last tetanus booster?\"      no  9. OTHER SYMPTOMS: \"Do you have any other symptoms?\"       no  10. PREGNANCY: \"Is there any chance you are pregnant?\" \"When was your last menstrual period?\"        no    Protocols used: HIP INJURY-A-OH      "

## 2021-05-16 ENCOUNTER — APPOINTMENT (OUTPATIENT)
Dept: RADIOLOGY | Facility: MEDICAL CENTER | Age: 76
End: 2021-05-16
Attending: ORTHOPAEDIC SURGERY
Payer: MEDICARE

## 2021-05-16 ENCOUNTER — HOSPITAL ENCOUNTER (EMERGENCY)
Facility: MEDICAL CENTER | Age: 76
End: 2021-05-16
Attending: EMERGENCY MEDICINE | Admitting: ORTHOPAEDIC SURGERY
Payer: MEDICARE

## 2021-05-16 ENCOUNTER — ANESTHESIA (OUTPATIENT)
Dept: SURGERY | Facility: MEDICAL CENTER | Age: 76
End: 2021-05-16
Payer: MEDICARE

## 2021-05-16 ENCOUNTER — APPOINTMENT (OUTPATIENT)
Dept: RADIOLOGY | Facility: MEDICAL CENTER | Age: 76
End: 2021-05-16
Attending: EMERGENCY MEDICINE
Payer: MEDICARE

## 2021-05-16 ENCOUNTER — ANESTHESIA EVENT (OUTPATIENT)
Dept: SURGERY | Facility: MEDICAL CENTER | Age: 76
End: 2021-05-16
Payer: MEDICARE

## 2021-05-16 VITALS
DIASTOLIC BLOOD PRESSURE: 67 MMHG | RESPIRATION RATE: 16 BRPM | OXYGEN SATURATION: 92 % | SYSTOLIC BLOOD PRESSURE: 118 MMHG | HEIGHT: 61 IN | HEART RATE: 72 BPM | TEMPERATURE: 98 F | BODY MASS INDEX: 33.99 KG/M2 | WEIGHT: 180 LBS

## 2021-05-16 DIAGNOSIS — S73.005A DISLOCATION OF LEFT HIP, INITIAL ENCOUNTER (HCC): ICD-10-CM

## 2021-05-16 LAB
ALBUMIN SERPL BCP-MCNC: 4.1 G/DL (ref 3.2–4.9)
ALBUMIN/GLOB SERPL: 2 G/DL
ALP SERPL-CCNC: 63 U/L (ref 30–99)
ALT SERPL-CCNC: 27 U/L (ref 2–50)
ANION GAP SERPL CALC-SCNC: 12 MMOL/L (ref 7–16)
APTT PPP: 27.8 SEC (ref 24.7–36)
AST SERPL-CCNC: 24 U/L (ref 12–45)
BASOPHILS # BLD AUTO: 0.6 % (ref 0–1.8)
BASOPHILS # BLD: 0.06 K/UL (ref 0–0.12)
BILIRUB SERPL-MCNC: 0.4 MG/DL (ref 0.1–1.5)
BUN SERPL-MCNC: 22 MG/DL (ref 8–22)
CALCIUM SERPL-MCNC: 8.8 MG/DL (ref 8.5–10.5)
CHLORIDE SERPL-SCNC: 108 MMOL/L (ref 96–112)
CO2 SERPL-SCNC: 23 MMOL/L (ref 20–33)
CREAT SERPL-MCNC: 0.48 MG/DL (ref 0.5–1.4)
EKG IMPRESSION: NORMAL
EOSINOPHIL # BLD AUTO: 0.06 K/UL (ref 0–0.51)
EOSINOPHIL NFR BLD: 0.6 % (ref 0–6.9)
ERYTHROCYTE [DISTWIDTH] IN BLOOD BY AUTOMATED COUNT: 45.9 FL (ref 35.9–50)
GLOBULIN SER CALC-MCNC: 2.1 G/DL (ref 1.9–3.5)
GLUCOSE SERPL-MCNC: 66 MG/DL (ref 65–99)
HCT VFR BLD AUTO: 43.2 % (ref 37–47)
HGB BLD-MCNC: 13.9 G/DL (ref 12–16)
IMM GRANULOCYTES # BLD AUTO: 0.08 K/UL (ref 0–0.11)
IMM GRANULOCYTES NFR BLD AUTO: 0.7 % (ref 0–0.9)
INR PPP: 1.1 (ref 0.87–1.13)
LYMPHOCYTES # BLD AUTO: 2.85 K/UL (ref 1–4.8)
LYMPHOCYTES NFR BLD: 26.5 % (ref 22–41)
MCH RBC QN AUTO: 30.1 PG (ref 27–33)
MCHC RBC AUTO-ENTMCNC: 32.2 G/DL (ref 33.6–35)
MCV RBC AUTO: 93.5 FL (ref 81.4–97.8)
MONOCYTES # BLD AUTO: 0.96 K/UL (ref 0–0.85)
MONOCYTES NFR BLD AUTO: 8.9 % (ref 0–13.4)
NEUTROPHILS # BLD AUTO: 6.76 K/UL (ref 2–7.15)
NEUTROPHILS NFR BLD: 62.7 % (ref 44–72)
NRBC # BLD AUTO: 0 K/UL
NRBC BLD-RTO: 0 /100 WBC
PLATELET # BLD AUTO: 203 K/UL (ref 164–446)
PMV BLD AUTO: 8.6 FL (ref 9–12.9)
POTASSIUM SERPL-SCNC: 3.6 MMOL/L (ref 3.6–5.5)
PROT SERPL-MCNC: 6.2 G/DL (ref 6–8.2)
PROTHROMBIN TIME: 14.6 SEC (ref 12–14.6)
RBC # BLD AUTO: 4.62 M/UL (ref 4.2–5.4)
SARS-COV+SARS-COV-2 AG RESP QL IA.RAPID: NOTDETECTED
SARS-COV-2 RNA RESP QL NAA+PROBE: NOTDETECTED
SODIUM SERPL-SCNC: 143 MMOL/L (ref 135–145)
SPECIMEN SOURCE: NORMAL
SPECIMEN SOURCE: NORMAL
WBC # BLD AUTO: 10.8 K/UL (ref 4.8–10.8)

## 2021-05-16 PROCEDURE — 96376 TX/PRO/DX INJ SAME DRUG ADON: CPT | Mod: XU

## 2021-05-16 PROCEDURE — 80053 COMPREHEN METABOLIC PANEL: CPT

## 2021-05-16 PROCEDURE — 96375 TX/PRO/DX INJ NEW DRUG ADDON: CPT | Mod: XU

## 2021-05-16 PROCEDURE — 160002 HCHG RECOVERY MINUTES (STAT): Performed by: ORTHOPAEDIC SURGERY

## 2021-05-16 PROCEDURE — 160026 HCHG SURGERY MINUTES - 1ST 30 MINS LEVEL 1: Performed by: ORTHOPAEDIC SURGERY

## 2021-05-16 PROCEDURE — 73501 X-RAY EXAM HIP UNI 1 VIEW: CPT | Mod: LT

## 2021-05-16 PROCEDURE — 96374 THER/PROPH/DIAG INJ IV PUSH: CPT | Mod: XU

## 2021-05-16 PROCEDURE — 73502 X-RAY EXAM HIP UNI 2-3 VIEWS: CPT | Mod: LT

## 2021-05-16 PROCEDURE — 160048 HCHG OR STATISTICAL LEVEL 1-5: Performed by: ORTHOPAEDIC SURGERY

## 2021-05-16 PROCEDURE — 85730 THROMBOPLASTIN TIME PARTIAL: CPT

## 2021-05-16 PROCEDURE — 87426 SARSCOV CORONAVIRUS AG IA: CPT

## 2021-05-16 PROCEDURE — 85025 COMPLETE CBC W/AUTO DIFF WBC: CPT

## 2021-05-16 PROCEDURE — 160035 HCHG PACU - 1ST 60 MINS PHASE I: Performed by: ORTHOPAEDIC SURGERY

## 2021-05-16 PROCEDURE — C9803 HOPD COVID-19 SPEC COLLECT: HCPCS | Performed by: EMERGENCY MEDICINE

## 2021-05-16 PROCEDURE — 700105 HCHG RX REV CODE 258: Performed by: ANESTHESIOLOGY

## 2021-05-16 PROCEDURE — U0005 INFEC AGEN DETEC AMPLI PROBE: HCPCS

## 2021-05-16 PROCEDURE — 160046 HCHG PACU - 1ST 60 MINS PHASE II: Performed by: ORTHOPAEDIC SURGERY

## 2021-05-16 PROCEDURE — 160025 RECOVERY II MINUTES (STATS): Performed by: ORTHOPAEDIC SURGERY

## 2021-05-16 PROCEDURE — 93005 ELECTROCARDIOGRAM TRACING: CPT | Performed by: EMERGENCY MEDICINE

## 2021-05-16 PROCEDURE — 700111 HCHG RX REV CODE 636 W/ 250 OVERRIDE (IP): Performed by: ANESTHESIOLOGY

## 2021-05-16 PROCEDURE — 27265 TREAT HIP DISLOCATION: CPT

## 2021-05-16 PROCEDURE — U0003 INFECTIOUS AGENT DETECTION BY NUCLEIC ACID (DNA OR RNA); SEVERE ACUTE RESPIRATORY SYNDROME CORONAVIRUS 2 (SARS-COV-2) (CORONAVIRUS DISEASE [COVID-19]), AMPLIFIED PROBE TECHNIQUE, MAKING USE OF HIGH THROUGHPUT TECHNOLOGIES AS DESCRIBED BY CMS-2020-01-R: HCPCS

## 2021-05-16 PROCEDURE — 94799 UNLISTED PULMONARY SVC/PX: CPT

## 2021-05-16 PROCEDURE — 700111 HCHG RX REV CODE 636 W/ 250 OVERRIDE (IP): Performed by: EMERGENCY MEDICINE

## 2021-05-16 PROCEDURE — 99291 CRITICAL CARE FIRST HOUR: CPT

## 2021-05-16 PROCEDURE — 160009 HCHG ANES TIME/MIN: Performed by: ORTHOPAEDIC SURGERY

## 2021-05-16 PROCEDURE — 85610 PROTHROMBIN TIME: CPT

## 2021-05-16 RX ORDER — SUCCINYLCHOLINE/SOD CL,ISO/PF 200MG/10ML
SYRINGE (ML) INTRAVENOUS PRN
Status: DISCONTINUED | OUTPATIENT
Start: 2021-05-16 | End: 2021-05-16 | Stop reason: SURG

## 2021-05-16 RX ORDER — SODIUM CHLORIDE, SODIUM LACTATE, POTASSIUM CHLORIDE, CALCIUM CHLORIDE 600; 310; 30; 20 MG/100ML; MG/100ML; MG/100ML; MG/100ML
INJECTION, SOLUTION INTRAVENOUS CONTINUOUS
Status: DISCONTINUED | OUTPATIENT
Start: 2021-05-16 | End: 2021-05-16 | Stop reason: HOSPADM

## 2021-05-16 RX ORDER — MEPERIDINE HYDROCHLORIDE 25 MG/ML
6.25 INJECTION INTRAMUSCULAR; INTRAVENOUS; SUBCUTANEOUS
Status: DISCONTINUED | OUTPATIENT
Start: 2021-05-16 | End: 2021-05-16 | Stop reason: HOSPADM

## 2021-05-16 RX ORDER — OXYCODONE HCL 5 MG/5 ML
10 SOLUTION, ORAL ORAL
Status: DISCONTINUED | OUTPATIENT
Start: 2021-05-16 | End: 2021-05-16 | Stop reason: HOSPADM

## 2021-05-16 RX ORDER — OXYCODONE HCL 5 MG/5 ML
5 SOLUTION, ORAL ORAL
Status: DISCONTINUED | OUTPATIENT
Start: 2021-05-16 | End: 2021-05-16 | Stop reason: HOSPADM

## 2021-05-16 RX ORDER — ONDANSETRON 2 MG/ML
4 INJECTION INTRAMUSCULAR; INTRAVENOUS ONCE
Status: COMPLETED | OUTPATIENT
Start: 2021-05-16 | End: 2021-05-16

## 2021-05-16 RX ORDER — HYDROMORPHONE HYDROCHLORIDE 1 MG/ML
0.2 INJECTION, SOLUTION INTRAMUSCULAR; INTRAVENOUS; SUBCUTANEOUS
Status: DISCONTINUED | OUTPATIENT
Start: 2021-05-16 | End: 2021-05-16 | Stop reason: HOSPADM

## 2021-05-16 RX ORDER — HYDROMORPHONE HYDROCHLORIDE 1 MG/ML
0.4 INJECTION, SOLUTION INTRAMUSCULAR; INTRAVENOUS; SUBCUTANEOUS
Status: DISCONTINUED | OUTPATIENT
Start: 2021-05-16 | End: 2021-05-16 | Stop reason: HOSPADM

## 2021-05-16 RX ORDER — DIPHENHYDRAMINE HYDROCHLORIDE 50 MG/ML
12.5 INJECTION INTRAMUSCULAR; INTRAVENOUS
Status: DISCONTINUED | OUTPATIENT
Start: 2021-05-16 | End: 2021-05-16 | Stop reason: HOSPADM

## 2021-05-16 RX ORDER — SODIUM CHLORIDE, SODIUM LACTATE, POTASSIUM CHLORIDE, CALCIUM CHLORIDE 600; 310; 30; 20 MG/100ML; MG/100ML; MG/100ML; MG/100ML
INJECTION, SOLUTION INTRAVENOUS
Status: DISCONTINUED | OUTPATIENT
Start: 2021-05-16 | End: 2021-05-16 | Stop reason: SURG

## 2021-05-16 RX ORDER — MORPHINE SULFATE 4 MG/ML
4 INJECTION, SOLUTION INTRAMUSCULAR; INTRAVENOUS ONCE
Status: COMPLETED | OUTPATIENT
Start: 2021-05-16 | End: 2021-05-16

## 2021-05-16 RX ORDER — HALOPERIDOL 5 MG/ML
1 INJECTION INTRAMUSCULAR
Status: DISCONTINUED | OUTPATIENT
Start: 2021-05-16 | End: 2021-05-16 | Stop reason: HOSPADM

## 2021-05-16 RX ORDER — FAMOTIDINE 20 MG/1
20 TABLET, FILM COATED ORAL 2 TIMES DAILY
Status: ON HOLD | COMMUNITY
End: 2023-01-25

## 2021-05-16 RX ORDER — HYDROMORPHONE HYDROCHLORIDE 1 MG/ML
0.1 INJECTION, SOLUTION INTRAMUSCULAR; INTRAVENOUS; SUBCUTANEOUS
Status: DISCONTINUED | OUTPATIENT
Start: 2021-05-16 | End: 2021-05-16 | Stop reason: HOSPADM

## 2021-05-16 RX ORDER — ONDANSETRON 2 MG/ML
4 INJECTION INTRAMUSCULAR; INTRAVENOUS
Status: DISCONTINUED | OUTPATIENT
Start: 2021-05-16 | End: 2021-05-16 | Stop reason: HOSPADM

## 2021-05-16 RX ORDER — PROPOFOL 10 MG/ML
200 INJECTION, EMULSION INTRAVENOUS ONCE
Status: COMPLETED | OUTPATIENT
Start: 2021-05-16 | End: 2021-05-16

## 2021-05-16 RX ADMIN — MORPHINE SULFATE 4 MG: 4 INJECTION INTRAVENOUS at 11:54

## 2021-05-16 RX ADMIN — MORPHINE SULFATE 4 MG: 4 INJECTION INTRAVENOUS at 14:31

## 2021-05-16 RX ADMIN — PROPOFOL 80 MG: 10 INJECTION, EMULSION INTRAVENOUS at 13:17

## 2021-05-16 RX ADMIN — ONDANSETRON 4 MG: 2 INJECTION INTRAMUSCULAR; INTRAVENOUS at 11:54

## 2021-05-16 RX ADMIN — SODIUM CHLORIDE, POTASSIUM CHLORIDE, SODIUM LACTATE AND CALCIUM CHLORIDE: 600; 310; 30; 20 INJECTION, SOLUTION INTRAVENOUS at 15:45

## 2021-05-16 RX ADMIN — Medication 100 MG: at 15:48

## 2021-05-16 RX ADMIN — PROPOFOL 100 MG: 10 INJECTION, EMULSION INTRAVENOUS at 15:48

## 2021-05-16 ASSESSMENT — PAIN DESCRIPTION - PAIN TYPE
TYPE: SURGICAL PAIN
TYPE: ACUTE PAIN
TYPE: SURGICAL PAIN

## 2021-05-16 ASSESSMENT — PAIN SCALES - GENERAL: PAIN_LEVEL: 0

## 2021-05-16 ASSESSMENT — FIBROSIS 4 INDEX: FIB4 SCORE: 1.4

## 2021-05-16 NOTE — RESPIRATORY CARE
Conscious Sedation Respiratory Update       O2 (LPM): 2 (05/16/21 1116)       Events/Summary/Plan: present for conscious sedation. ETCO2 11-25 t/o procedure.  (05/16/21 8683)

## 2021-05-16 NOTE — ANESTHESIA POSTPROCEDURE EVALUATION
Patient: Maryam Gomez    Procedure Summary     Date: 05/16/21 Room / Location: [place] [address] / SURGERY [facility]    Anesthesia Start: 1545 Anesthesia Stop: 1559    Procedure: CLOSED REDUCTION (Left Hip) Diagnosis:     Surgeons: Baltazar Hamilton M.D. Responsible Provider: Vj Morillo M.D.    Anesthesia Type: general ASA Status: 2          Final Anesthesia Type: general  Last vitals  BP   Blood Pressure : 150/94    Temp   37.1 °C (98.7 °F)    Pulse   77   Resp   16    SpO2   99 %      Anesthesia Post Evaluation    Patient location during evaluation: PACU  Patient participation: complete - patient participated  Level of consciousness: awake and alert  Pain score: 0    Airway patency: patent  Anesthetic complications: no  Cardiovascular status: hemodynamically stable  Respiratory status: acceptable  Hydration status: euvolemic    PONV: none          No complications documented.     Nurse Pain Score: 6 (NPRS)

## 2021-05-16 NOTE — ED NOTES
Med rec updated  And complete. Allergies reviewed. Met with pt at bedside. Pt denies antibiotic use in last 14 days.  Pt takes both lansoprazole and famotidine. Twice daily.      Home pharmacy [address] [phone]

## 2021-05-16 NOTE — ANESTHESIA TIME REPORT
Anesthesia Start and Stop Event Times     Date Time Event    5/16/2021 1538 Ready for Procedure     1545 Anesthesia Start     1559 Anesthesia Stop        Responsible Staff  05/16/21    Name Role Begin End    Vj Morillo M.D. Anesth 1540 1553        Preop Diagnosis (Free Text):  Pre-op Diagnosis             Preop Diagnosis (Codes):    Post op Diagnosis  Closed dislocation of left hip (HCC)      Premium Reason  Non-Premium    Comments:

## 2021-05-16 NOTE — ED NOTES
1300  Informed consent signed by pt.  Pt prepped for conscious sedation. Pre-oxygenated, hooked up to the monitor, pulse ox.  Crash cart/Ambubag and suction ready at bedside.

## 2021-05-16 NOTE — ANESTHESIA PREPROCEDURE EVALUATION
"    Relevant Problems   CARDIAC   (positive) Benign essential HTN     /65   Pulse 81   Temp 37.1 °C (98.7 °F) (Temporal)   Resp 14   Ht 1.549 m (5' 1\")   Wt 81.6 kg (180 lb)   SpO2 98%   BMI 34.01 kg/m²       Physical Exam    Airway   Mallampati: II  TM distance: >3 FB  Neck ROM: full       Cardiovascular - normal exam  Rhythm: regular  Rate: normal  (-) murmur     Dental - normal exam           Pulmonary - normal exam  Breath sounds clear to auscultation     Abdominal    Neurological - normal exam                 Anesthesia Plan    ASA 2       Plan - general       Airway plan will be mask          Induction: intravenous    Postoperative Plan: Postoperative administration of opioids is intended.    Pertinent diagnostic labs and testing reviewed    Informed Consent:    Anesthetic plan and risks discussed with patient.    Use of blood products discussed with: patient whom consented to blood products.         "

## 2021-05-16 NOTE — ED TRIAGE NOTES
Chief Complaint   Patient presents with   • Hip Pain     pt bib remsa from home c/o pain in left hip after pt sat down bent over to put lotion then twisted her left leg/hip then felt a pop. has hx of hip surgery 2012. states that this is the 3rd time it happened     Has pedal pulses. Cms intact. Left hip internally rotated. Pt unable to ambulate. Was given versed 2mg/fentanyl 50 mcg iv on scene

## 2021-05-17 NOTE — ED PROVIDER NOTES
"ED Provider Note    CHIEF COMPLAINT  Chief Complaint   Patient presents with   • Hip Pain     pt bib robinson from home c/o pain in left hip after pt sat down bent over to put lotion then twisted her left leg/hip then felt a pop. has hx of hip surgery . states that this is the 3rd time it happened       HPI  Maryam Gomez is a 75 y.o. female who presents to emergency room today with complaints of pain and dislocation of her left hip. Patient has had previous dislocations. She had tried to put some lotion on her left hip when she twisted and \"popped out\" this encourages [facility] emergency room. She has pain over her left hip and cannot ambulate. Pain is rated at a 8/10. No other trauma TRA. No numbness or tingling. Denies any chest pain or shortness of breath or other complaints at this time.    REVIEW OF SYSTEMS  See HPI for further details. All other systems are negative.     PAST MEDICAL HISTORY  Past Medical History:   Diagnosis Date   • Cramp of limb 3/16/2012   • Diastolic dysfunction 3/16/2012   • Edema 3/16/2012   • Hyperlipidemia 3/16/2012   • Palpitations 3/16/2012   • Anxiety    • Arthritis     osteoarthritis   • DVT (deep venous thrombosis) (HCC)     after a fall with 6mo coumadin   • GERD (gastroesophageal reflux disease)    • Glaucoma    • Heart burn     right shoulder   • Hiatus hernia syndrome    • Hypercholesteremia    • Hypertension    • Hypothyroid    • Indigestion    • Osteopenia    • Polio        FAMILY HISTORY  [unfilled]    SOCIAL HISTORY  Social History     Socioeconomic History   • Marital status:      Spouse name: Not on file   • Number of children: Not on file   • Years of education: Not on file   • Highest education level: Not on file   Occupational History   • Not on file   Tobacco Use   • Smoking status: Former Smoker     Packs/day: 1.00     Years: 27.00     Pack years: 27.00     Types: Cigarettes     Quit date: 1989     Years since quittin.3   • Smokeless " tobacco: Never Used   • Tobacco comment: Quit 1989   Vaping Use   • Vaping Use: Never used   Substance and Sexual Activity   • Alcohol use: Yes     Comment: 3 per day   • Drug use: No   • Sexual activity: Not on file   Other Topics Concern   • Not on file   Social History Narrative   • Not on file     Social Determinants of Health     Financial Resource Strain:    • Difficulty of Paying Living Expenses:    Food Insecurity:    • Worried About Running Out of Food in the Last Year:    • Ran Out of Food in the Last Year:    Transportation Needs:    • Lack of Transportation (Medical):    • Lack of Transportation (Non-Medical):    Physical Activity:    • Days of Exercise per Week:    • Minutes of Exercise per Session:    Stress:    • Feeling of Stress :    Social Connections:    • Frequency of Communication with Friends and Family:    • Frequency of Social Gatherings with Friends and Family:    • Attends [employer] Services:    • Active Member of Clubs or Organizations:    • Attends Club or Organization Meetings:    • Marital Status:    Intimate Partner Violence:    • Fear of Current or Ex-Partner:    • Emotionally Abused:    • Physically Abused:    • Sexually Abused:        SURGICAL HISTORY  Past Surgical History:   Procedure Laterality Date   • COLONOSCOPY N/A 8/4/2018    Procedure: COLONOSCOPY;  Surgeon: Lucy Carter M.D.;  Location: [facility];  Service: Gastroenterology   • SHOULDER DECOMPRESSION ARTHROSCOPIC Right 5/18/2016    Procedure: SHOULDER DECOMPRESSION ARTHROSCOPIC - SUBACROMIAL;  Surgeon: Joseph Ricketts M.D.;  Location: [facility];  Service:    • SHOULDER ARTHROSCOPY W/ ROTATOR CUFF REPAIR Right 5/18/2016    Procedure: SHOULDER ARTHROSCOPY W/ ROTATOR CUFF REPAIR ;  Surgeon: Joseph Ricketts M.D.;  Location: [facility];  Service:    • HIP ARTHROPLASTY TOTAL  11/14/2012    Performed by Joseph Ricketts M.D. at [facility]   • FOOT SURGERY   "1993   • OTHER  1990    nose   • HYSTERECTOMY LAPAROSCOPY  1984   • TONSILLECTOMY  1965       CURRENT MEDICATIONS  Home Medications     Reviewed by Nick Fish (Pharmacy Tech) on 05/16/21 at 1433  Med List Status: Complete   Medication Last Dose Status   aspirin EC (ECOTRIN) 81 MG Tablet Delayed Response 5/15/2021 Active   atorvastatin (LIPITOR) 20 MG Tab 5/15/2021 Active   B Complex Vitamins (VITAMIN B COMPLEX PO) 5/16/2021 Active   Calcium-Magnesium-Vitamin D (CITRACAL CALCIUM+D PO) 5/16/2021 Active   carvedilol (COREG) 6.25 MG Tab 5/16/2021 Active   famotidine (PEPCID) 20 MG Tab 5/16/2021 Active   Glucosamine-Chondroitin (COSAMIN DS PO) 5/16/2021 Active   lansoprazole (PREVACID) 30 MG CAPSULE DELAYED RELEASE 5/16/2021 Active   latanoprost (XALATAN) 0.005 % SOLN 5/15/2021 Active   levothyroxine (SYNTHROID) 100 MCG TABS 5/14/2021 Active   lisinopril (PRINIVIL) 10 MG TABS 5/16/2021 Active   Multiple Vitamins-Minerals (MULTI COMPLETE PO) 5/16/2021 Active   Omega-3 Fatty Acids (FISH OIL) 1200 MG Cap 5/16/2021 Active                ALLERGIES  No Known Allergies    PHYSICAL EXAM  VITAL SIGNS: /67   Pulse 72   Temp 36.7 °C (98 °F) (Temporal)   Resp 16   Ht 1.549 m (5' 1\")   Wt 81.6 kg (180 lb)   SpO2 92%   BMI 34.01 kg/m²       Constitutional: Well developed, Well nourished,  acute distress, Non-toxic appearance.   HENT: Normocephalic, Atraumatic, Bilateral external ears normal, Oropharynx moist, No oral exudates, Nose normal.   Eyes: PERRLA, EOMI, Conjunctiva normal, No discharge.   Neck: Normal range of motion, No tenderness, Supple, No stridor.   Lymphatic: No lymphadenopathy noted.   Cardiovascular: Normal heart rate, Normal rhythm, No murmurs, No rubs, No gallops.   Thorax & Lungs: Normal breath sounds, No respiratory distress, No wheezing, No chest tenderness.   Abdomen: Bowel sounds normal, Soft, No tenderness, No masses, No pulsatile masses.   Skin: Warm, Dry, No erythema, No rash.   Back: No " tenderness, No CVA tenderness.   Genitalia: External genitalia appear normal.   Extremities: Intact distal pulses, No edema, left hip tenderness, No cyanosis, No clubbing.   Musculoskeletal: deformity to the left hip consistent with posterior dislocation pulses and station attached distally.  Neurologic: Alert & oriented x 3, Normal motor function, Normal sensory function, No focal deficits noted.   Psychiatric: Affect normal, Judgment normal, Mood normal.     EKG  normal sinus rhythm 70 bpm, no ST elevation or depression, no widening of the QRS complex, good r wave progression. [place] intervals are normal. Axis is normal. This is a 12 lead EKG there is no previous EKG available at this time for comparison.    RADIOLOGY/PROCEDURES  DX-HIP-UNILATERAL-W/O PELVIS-2/3 VIEWS LEFT   Final Result      No residual dislocation of the left total arthroplasty identified on single AP view.      DX-HIP-UNILATERAL-WITH PELVIS-1 VIEW LEFT   Final Result      1.  Acute posterior superior dislocation of the femoral component of the left total hip arthroplasty.      2.  No fracture identified.      DX-PORTABLE FLUORO > 1 HOUR    (Results Pending)         COURSE & MEDICAL DECISION MAKING  Pertinent Labs & Imaging studies reviewed. (See chart for details)  attempted with sedation and traction and manipulation to reduce the left hip dislocation without success. Discussed case with Dr. Hamilton from orthopedics who would take the patient OR for reduction of left hip dislocation. Please refer to his consultation and notes for further plan and care.    FINAL IMPRESSION  1. Acute left hip dislocation  2. Hypertension by history  3. Hypothyroidism         Electronically signed by: Shailesh Marques D.O., 5/16/2021 5:36 PM

## 2021-05-17 NOTE — DISCHARGE INSTRUCTIONS
ACTIVITY: Rest and take it easy for the first 24 hours.  A responsible adult is recommended to remain with you during that time.  It is normal to feel sleepy.  We encourage you to not do anything that requires balance, judgment or coordination.    MILD FLU-LIKE SYMPTOMS ARE NORMAL. YOU MAY EXPERIENCE GENERALIZED MUSCLE ACHES, THROAT IRRITATION, HEADACHE AND/OR SOME NAUSEA.    FOR 24 HOURS DO NOT:  Drive, operate machinery or run household appliances.  Drink beer or alcoholic beverages.   Make important decisions or sign legal documents.    SPECIAL INSTRUCTIONS: Follow instructions by Surgeon. Weight bearing as tolerated. Wear knee immobilizer.     DIET: To avoid nausea, slowly advance diet as tolerated, avoiding spicy or greasy foods for the first day.  Add more substantial food to your diet according to your physician's instructions.  Babies can be fed formula or breast milk as soon as they are hungry.  INCREASE FLUIDS AND FIBER TO AVOID CONSTIPATION.    SURGICAL DRESSING/BATHING: May shower tomorrow.    FOLLOW-UP APPOINTMENT:  A follow-up appointment should be arranged with your doctor in 1-2 weeks, call to schedule.    You should CALL YOUR PHYSICIAN if you develop:  Fever greater than 101 degrees F.  Pain not relieved by medication, or persistent nausea or vomiting.  Excessive bleeding (blood soaking through dressing) or unexpected drainage from the wound.  Extreme redness or swelling around the incision site, drainage of pus or foul smelling drainage.  Inability to urinate or empty your bladder within 8 hours.  Problems with breathing or chest pain.    You should call 911 if you develop problems with breathing or chest pain.  If you are unable to contact your doctor or surgical center, you should go to the nearest emergency room or urgent care center.  Physician's telephone #: [phone]    If any questions arise, call your doctor.  If your doctor is not available, please feel free to call the Surgical  Center at [phone]. The Contact Center is open Monday through Friday 7AM to 5PM and may speak to a nurse at [phone], or toll free at [phone].     A registered nurse may call you a few days after your surgery to see how you are doing after your procedure.    MEDICATIONS: Resume taking daily medication.  Take prescribed pain medication with food.  If no medication is prescribed, you may take non-aspirin pain medication if needed.  PAIN MEDICATION CAN BE VERY CONSTIPATING.  Take a stool softener or laxative such as senokot, pericolace, or milk of magnesia if needed.    No prescriptions.     If your physician has prescribed pain medication that includes Acetaminophen (Tylenol), do not take additional Acetaminophen (Tylenol) while taking the prescribed medication.    Depression / Suicide Risk    As you are discharged from this [facility] facility, it is important to learn how to keep safe from harming yourself.    Recognize the warning signs:  · Abrupt changes in personality, positive or negative- including increase in energy   · Giving away possessions  · Change in eating patterns- significant weight changes-  positive or negative  · Change in sleeping patterns- unable to sleep or sleeping all the time   · Unwillingness or inability to communicate  · Depression  · Unusual sadness, discouragement and loneliness  · Talk of wanting to die  · Neglect of personal appearance   · Rebelliousness- reckless behavior  · Withdrawal from people/activities they love  · Confusion- inability to concentrate     If you or a loved one observes any of these behaviors or has concerns about self-harm, here's what you can do:  · Talk about it- your feelings and reasons for harming yourself  · Remove any means that you might use to hurt yourself (examples: pills, rope, extension cords, firearm)  · Get professional help from the community (Mental Health, Substance Abuse, psychological counseling)  · Do not be alone:Call  your Safe Contact- someone whom you trust who will be there for you.  · Call your local CRISIS HOTLINE 458-6417 or 186-480-0819  · Call your local Children's Mobile Crisis Response Team [place] (211) 478-2306 or www.[employer]  · Call the toll free National Suicide Prevention Hotlines   · National Suicide Prevention Lifeline 851-775-FULH (8921)  · National [place] Line Network 800-SUICIDE (039-6865)

## 2021-05-17 NOTE — CONSULTS
DATE OF SERVICE:  05/16/2021     CHIEF COMPLAINT:  Dislocated left total hip arthroplasty.     HISTORY OF PRESENT ILLNESS:  The patient is a 75-year-old woman who had a left   total hip arthroplasty 9 years ago.  About 2 years postop, she fell and   sustained a left hip dislocation.  The hip was reduced.  Approximately 4   months ago, she just rolled over in bed and had a second dislocation of the   left hip, which was again reduced.  This morning, she was sitting on the   toilet, putting lotion on her legs when she twisted the hip and it dislocated   again.  She knew immediately what happened.  She presented to the emergency   room where x-rays revealed a superior dislocation of the left total hip   arthroplasty.  She underwent an attempted reduction with IV sedation, which   was unsuccessful.  She denies other present complaints.     PAST MEDICAL HISTORY:  Remarkable for hypertension, hypercholesterolemia,   gastroesophageal reflux disease.     PAST SURGICAL HISTORY:  As above.     MEDICATIONS:  As in the med rec in the chart.     ALLERGIES:  No known drug allergies.     SOCIAL HISTORY:  The patient does not smoke.  She drinks a couple of glasses   of wine a day.     FAMILY HISTORY:  Noncontributory.     REVIEW OF SYSTEMS:  Negative for other musculoskeletal complaint.  Multisystem   review of at least 10 other systems is negative.     PHYSICAL EXAMINATION:    GENERAL:  On examination, she is awake and alert and in no acute distress.  VITAL SIGNS:  Stable.  EXTREMITIES:  She has a well-healed scar over the left hip.  She has obvious   shortening with some internal rotation of the left lower extremity.  Distal   pulses are 1+.  NEUROLOGIC:  Motor and sensory are grossly intact.     DIAGNOSTIC DATA:  X-ray examination, two views of the left hip show superior   posterior dislocation of the left hip, components otherwise appear intact.     ASSESSMENT:  Dislocated left total hip arthroplasty.     PLAN: The patient has  already had a failed attempt at closed reduction under   IV sedation.  We will take her to the operating room for a closed reduction   under general anesthetic.  I discussed the procedure and associated risks,   benefits and alternatives.  No guarantees regarding outcome were given.    Signed informed consent will be obtained.        ______________________________  MD TREY Booth/MARTHA/PHILIPP    DD:  05/16/2021 15:22  DT:  05/16/2021 18:00    Job#:  [id number]

## 2021-05-17 NOTE — OR NURSING
Arrived to Phase II after report from Delfin MCKEON.     VSS, denies nausea, reports pain is 0/10 and tolerable. Ambulated from gurney to chair with standby assist.    Surgical site not applicable. Knee immobilizer in place to LLE. CMS intact, +2 pp    Alarms on and set to appropriate parameters. Call light within reach, rounding in place.

## 2021-05-17 NOTE — OR NURSING
Patients VSS; denies N/V; states pain is at tolerable level. Pt up and ambulated to BR, steady gait, voided adequately. Discharge instructions given as well as pain management handout; pt verbalized understanding and questions answered. Patient states ready to go home. No prescriptions given. Pt discharged via wheelchair to friend.

## 2021-05-17 NOTE — OP REPORT
DATE OF SERVICE:  05/16/2021     PREOPERATIVE DIAGNOSIS:  Dislocated left total hip arthroplasty.     POSTOPERATIVE DIAGNOSIS:  Dislocated left total hip arthroplasty.     PROCEDURE:  Closed reduction of left total hip arthroplasty under general   anesthesia.     SURGEON:  Baltazar Hamilton MD     ANESTHESIA:  General.     BLOOD LOSS:  None.     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  She dislocated this morning   while bending over while sitting on the toilet.  She was seen in the emergency   room where an attempt at closed reduction under IV sedation was unsuccessful.    She is indicated for closed reduction under general anesthesia.     DESCRIPTION OF PROCEDURE:  Following induction of general anesthesia, timeout   was performed confirming patient, site, and procedure.  A blanket was placed   around the right groin for countertraction, while countertraction was being   pulled, longitudinal traction on the left lower extremity was done from the   ankle, some internal rotation was done to accentuate the deformity and with   longitudinal traction, the hip palpably reduced and at this point showed   symmetric length and rotation with the opposite side.  Fluoroscopic view   showed concentric reduction of the left hip.  Left knee immobilizer was   placed.  The patient was awakened and transferred to recovery room in stable   condition.     DISPOSITION:  She will be discharged to home with the knee immobilizer on full   time.  She may be weightbearing as tolerated with a walker.  She will follow   up with her operating surgeon, Dr. Ricketts this week.        ______________________________  MD TREY Booth/BRANDEN/PHILIPP    DD:  05/16/2021 15:58  DT:  05/16/2021 17:24    Job#:  [id number]

## 2021-05-21 ENCOUNTER — HOSPITAL ENCOUNTER (OUTPATIENT)
Dept: LAB | Facility: MEDICAL CENTER | Age: 76
End: 2021-05-21
Attending: ORTHOPAEDIC SURGERY
Payer: MEDICARE

## 2021-05-21 LAB — CRP SERPL HS-MCNC: 2.76 MG/DL (ref 0–0.75)

## 2021-05-21 PROCEDURE — 86140 C-REACTIVE PROTEIN: CPT

## 2021-05-21 PROCEDURE — 36415 COLL VENOUS BLD VENIPUNCTURE: CPT

## 2021-05-24 ENCOUNTER — HOSPITAL ENCOUNTER (OUTPATIENT)
Dept: LAB | Facility: MEDICAL CENTER | Age: 76
End: 2021-05-24
Attending: ORTHOPAEDIC SURGERY
Payer: MEDICARE

## 2021-05-24 LAB
BASOPHILS # BLD AUTO: 0.6 % (ref 0–1.8)
BASOPHILS # BLD: 0.05 K/UL (ref 0–0.12)
EOSINOPHIL # BLD AUTO: 0.3 K/UL (ref 0–0.51)
EOSINOPHIL NFR BLD: 3.9 % (ref 0–6.9)
ERYTHROCYTE [DISTWIDTH] IN BLOOD BY AUTOMATED COUNT: 45.1 FL (ref 35.9–50)
HCT VFR BLD AUTO: 40.7 % (ref 37–47)
HGB BLD-MCNC: 13.1 G/DL (ref 12–16)
IMM GRANULOCYTES # BLD AUTO: 0.03 K/UL (ref 0–0.11)
IMM GRANULOCYTES NFR BLD AUTO: 0.4 % (ref 0–0.9)
LYMPHOCYTES # BLD AUTO: 2.36 K/UL (ref 1–4.8)
LYMPHOCYTES NFR BLD: 30.4 % (ref 22–41)
MCH RBC QN AUTO: 30.4 PG (ref 27–33)
MCHC RBC AUTO-ENTMCNC: 32.2 G/DL (ref 33.6–35)
MCV RBC AUTO: 94.4 FL (ref 81.4–97.8)
MONOCYTES # BLD AUTO: 0.69 K/UL (ref 0–0.85)
MONOCYTES NFR BLD AUTO: 8.9 % (ref 0–13.4)
NEUTROPHILS # BLD AUTO: 4.34 K/UL (ref 2–7.15)
NEUTROPHILS NFR BLD: 55.8 % (ref 44–72)
NRBC # BLD AUTO: 0 K/UL
NRBC BLD-RTO: 0 /100 WBC
PLATELET # BLD AUTO: 255 K/UL (ref 164–446)
PMV BLD AUTO: 8.7 FL (ref 9–12.9)
RBC # BLD AUTO: 4.31 M/UL (ref 4.2–5.4)
WBC # BLD AUTO: 7.8 K/UL (ref 4.8–10.8)

## 2021-05-24 PROCEDURE — 36415 COLL VENOUS BLD VENIPUNCTURE: CPT

## 2021-05-24 PROCEDURE — 85025 COMPLETE CBC W/AUTO DIFF WBC: CPT

## 2021-05-24 PROCEDURE — 85652 RBC SED RATE AUTOMATED: CPT

## 2021-05-25 LAB — ERYTHROCYTE [SEDIMENTATION RATE] IN BLOOD BY WESTERGREN METHOD: 12 MM/HOUR (ref 0–25)

## 2021-06-29 PROBLEM — M51.36 LUMBAR DEGENERATIVE DISC DISEASE: Status: ACTIVE | Noted: 2021-06-29

## 2021-06-29 PROBLEM — M79.10 MYALGIA: Status: ACTIVE | Noted: 2021-06-29

## 2021-06-29 PROBLEM — M54.16 LUMBAR RADICULITIS: Status: ACTIVE | Noted: 2021-06-29

## 2022-05-06 ENCOUNTER — HOSPITAL ENCOUNTER (OUTPATIENT)
Dept: RADIOLOGY | Facility: MEDICAL CENTER | Age: 77
End: 2022-05-06
Attending: PHYSICAL MEDICINE & REHABILITATION
Payer: MEDICARE

## 2022-05-06 DIAGNOSIS — M25.512 LEFT SHOULDER PAIN, UNSPECIFIED CHRONICITY: ICD-10-CM

## 2022-05-06 PROCEDURE — 73221 MRI JOINT UPR EXTREM W/O DYE: CPT | Mod: LT,MH

## 2022-06-16 ENCOUNTER — APPOINTMENT (RX ONLY)
Dept: URBAN - METROPOLITAN AREA CLINIC 6 | Facility: CLINIC | Age: 77
Setting detail: DERMATOLOGY
End: 2022-06-16

## 2022-06-16 DIAGNOSIS — L57.8 OTHER SKIN CHANGES DUE TO CHRONIC EXPOSURE TO NONIONIZING RADIATION: ICD-10-CM

## 2022-06-16 DIAGNOSIS — L57.0 ACTINIC KERATOSIS: ICD-10-CM

## 2022-06-16 DIAGNOSIS — L81.4 OTHER MELANIN HYPERPIGMENTATION: ICD-10-CM

## 2022-06-16 DIAGNOSIS — L82.0 INFLAMED SEBORRHEIC KERATOSIS: ICD-10-CM

## 2022-06-16 PROBLEM — D48.5 NEOPLASM OF UNCERTAIN BEHAVIOR OF SKIN: Status: ACTIVE | Noted: 2022-06-16

## 2022-06-16 PROCEDURE — 11102 TANGNTL BX SKIN SINGLE LES: CPT

## 2022-06-16 PROCEDURE — ? BIOPSY BY SHAVE METHOD

## 2022-06-16 PROCEDURE — 17000 DESTRUCT PREMALG LESION: CPT | Mod: 59

## 2022-06-16 PROCEDURE — 11103 TANGNTL BX SKIN EA SEP/ADDL: CPT

## 2022-06-16 PROCEDURE — 17003 DESTRUCT PREMALG LES 2-14: CPT

## 2022-06-16 PROCEDURE — ? LIQUID NITROGEN

## 2022-06-16 PROCEDURE — ? COUNSELING

## 2022-06-16 PROCEDURE — 99203 OFFICE O/P NEW LOW 30 MIN: CPT | Mod: 25

## 2022-06-16 ASSESSMENT — LOCATION SIMPLE DESCRIPTION DERM
LOCATION SIMPLE: LEFT HAND
LOCATION SIMPLE: LEFT CHEEK
LOCATION SIMPLE: LEFT FOREARM
LOCATION SIMPLE: RIGHT FOREARM
LOCATION SIMPLE: RIGHT HAND
LOCATION SIMPLE: LEFT LOWER BACK
LOCATION SIMPLE: RIGHT FOREARM

## 2022-06-16 ASSESSMENT — LOCATION DETAILED DESCRIPTION DERM
LOCATION DETAILED: RIGHT RADIAL DORSAL HAND
LOCATION DETAILED: RIGHT PROXIMAL DORSAL FOREARM
LOCATION DETAILED: RIGHT DISTAL DORSAL FOREARM
LOCATION DETAILED: LEFT ULNAR DORSAL HAND
LOCATION DETAILED: LEFT DISTAL DORSAL FOREARM
LOCATION DETAILED: LEFT SUPERIOR CENTRAL BUCCAL CHEEK
LOCATION DETAILED: LEFT PROXIMAL DORSAL FOREARM
LOCATION DETAILED: LEFT SUPERIOR LATERAL MIDBACK
LOCATION DETAILED: LEFT PROXIMAL ULNAR DORSAL FOREARM

## 2022-06-16 ASSESSMENT — LOCATION ZONE DERM
LOCATION ZONE: ARM
LOCATION ZONE: TRUNK
LOCATION ZONE: FACE
LOCATION ZONE: ARM
LOCATION ZONE: HAND

## 2022-06-16 NOTE — PROCEDURE: MIPS QUALITY
Quality 431: Preventive Care And Screening: Unhealthy Alcohol Use - Screening: Patient not identified as an unhealthy alcohol user when screened for unhealthy alcohol use using a systematic screening method
Quality 111:Pneumonia Vaccination Status For Older Adults: Pneumococcal vaccine administered on or after patient’s 60th birthday and before the end of the measurement period
Detail Level: Detailed
Quality 226: Preventive Care And Screening: Tobacco Use: Screening And Cessation Intervention: Patient screened for tobacco use and is an ex/non-smoker
Quality 130: Documentation Of Current Medications In The Medical Record: Current Medications Documented

## 2022-07-17 ENCOUNTER — HOSPITAL ENCOUNTER (EMERGENCY)
Facility: MEDICAL CENTER | Age: 77
End: 2022-07-17
Attending: EMERGENCY MEDICINE
Payer: MEDICARE

## 2022-07-17 ENCOUNTER — APPOINTMENT (OUTPATIENT)
Dept: RADIOLOGY | Facility: MEDICAL CENTER | Age: 77
End: 2022-07-17
Attending: EMERGENCY MEDICINE
Payer: MEDICARE

## 2022-07-17 VITALS
DIASTOLIC BLOOD PRESSURE: 79 MMHG | TEMPERATURE: 97.6 F | HEIGHT: 61 IN | WEIGHT: 170 LBS | BODY MASS INDEX: 32.1 KG/M2 | RESPIRATION RATE: 18 BRPM | SYSTOLIC BLOOD PRESSURE: 169 MMHG | HEART RATE: 77 BPM | OXYGEN SATURATION: 92 %

## 2022-07-17 DIAGNOSIS — R11.0 NAUSEA: ICD-10-CM

## 2022-07-17 DIAGNOSIS — R10.13 EPIGASTRIC PAIN: ICD-10-CM

## 2022-07-17 DIAGNOSIS — K21.9 GASTROESOPHAGEAL REFLUX DISEASE, UNSPECIFIED WHETHER ESOPHAGITIS PRESENT: ICD-10-CM

## 2022-07-17 LAB
ALBUMIN SERPL BCP-MCNC: 4.3 G/DL (ref 3.2–4.9)
ALBUMIN/GLOB SERPL: 1.7 G/DL
ALP SERPL-CCNC: 71 U/L (ref 30–99)
ALT SERPL-CCNC: 21 U/L (ref 2–50)
ANION GAP SERPL CALC-SCNC: 13 MMOL/L (ref 7–16)
AST SERPL-CCNC: 29 U/L (ref 12–45)
BASOPHILS # BLD AUTO: 0.9 % (ref 0–1.8)
BASOPHILS # BLD: 0.05 K/UL (ref 0–0.12)
BILIRUB SERPL-MCNC: 0.6 MG/DL (ref 0.1–1.5)
BUN SERPL-MCNC: 15 MG/DL (ref 8–22)
CALCIUM SERPL-MCNC: 9.6 MG/DL (ref 8.5–10.5)
CHLORIDE SERPL-SCNC: 106 MMOL/L (ref 96–112)
CO2 SERPL-SCNC: 22 MMOL/L (ref 20–33)
CREAT SERPL-MCNC: 0.46 MG/DL (ref 0.5–1.4)
EOSINOPHIL # BLD AUTO: 0.28 K/UL (ref 0–0.51)
EOSINOPHIL NFR BLD: 5.3 % (ref 0–6.9)
ERYTHROCYTE [DISTWIDTH] IN BLOOD BY AUTOMATED COUNT: 46 FL (ref 35.9–50)
GFR SERPLBLD CREATININE-BSD FMLA CKD-EPI: 97 ML/MIN/1.73 M 2
GLOBULIN SER CALC-MCNC: 2.5 G/DL (ref 1.9–3.5)
GLUCOSE SERPL-MCNC: 92 MG/DL (ref 65–99)
HCT VFR BLD AUTO: 42.4 % (ref 37–47)
HGB BLD-MCNC: 14 G/DL (ref 12–16)
IMM GRANULOCYTES # BLD AUTO: 0.01 K/UL (ref 0–0.11)
IMM GRANULOCYTES NFR BLD AUTO: 0.2 % (ref 0–0.9)
LIPASE SERPL-CCNC: 37 U/L (ref 11–82)
LYMPHOCYTES # BLD AUTO: 1.89 K/UL (ref 1–4.8)
LYMPHOCYTES NFR BLD: 35.9 % (ref 22–41)
MCH RBC QN AUTO: 30.8 PG (ref 27–33)
MCHC RBC AUTO-ENTMCNC: 33 G/DL (ref 33.6–35)
MCV RBC AUTO: 93.2 FL (ref 81.4–97.8)
MONOCYTES # BLD AUTO: 0.46 K/UL (ref 0–0.85)
MONOCYTES NFR BLD AUTO: 8.7 % (ref 0–13.4)
NEUTROPHILS # BLD AUTO: 2.58 K/UL (ref 2–7.15)
NEUTROPHILS NFR BLD: 49 % (ref 44–72)
NRBC # BLD AUTO: 0 K/UL
NRBC BLD-RTO: 0 /100 WBC
PLATELET # BLD AUTO: 212 K/UL (ref 164–446)
PMV BLD AUTO: 8 FL (ref 9–12.9)
POTASSIUM SERPL-SCNC: 4.1 MMOL/L (ref 3.6–5.5)
PROT SERPL-MCNC: 6.8 G/DL (ref 6–8.2)
RBC # BLD AUTO: 4.55 M/UL (ref 4.2–5.4)
SODIUM SERPL-SCNC: 141 MMOL/L (ref 135–145)
WBC # BLD AUTO: 5.3 K/UL (ref 4.8–10.8)

## 2022-07-17 PROCEDURE — 99285 EMERGENCY DEPT VISIT HI MDM: CPT

## 2022-07-17 PROCEDURE — A9270 NON-COVERED ITEM OR SERVICE: HCPCS | Performed by: EMERGENCY MEDICINE

## 2022-07-17 PROCEDURE — 83690 ASSAY OF LIPASE: CPT

## 2022-07-17 PROCEDURE — 700111 HCHG RX REV CODE 636 W/ 250 OVERRIDE (IP): Performed by: EMERGENCY MEDICINE

## 2022-07-17 PROCEDURE — 700117 HCHG RX CONTRAST REV CODE 255: Performed by: EMERGENCY MEDICINE

## 2022-07-17 PROCEDURE — 700102 HCHG RX REV CODE 250 W/ 637 OVERRIDE(OP): Performed by: EMERGENCY MEDICINE

## 2022-07-17 PROCEDURE — 36415 COLL VENOUS BLD VENIPUNCTURE: CPT

## 2022-07-17 PROCEDURE — 85025 COMPLETE CBC W/AUTO DIFF WBC: CPT

## 2022-07-17 PROCEDURE — 80053 COMPREHEN METABOLIC PANEL: CPT

## 2022-07-17 PROCEDURE — 74177 CT ABD & PELVIS W/CONTRAST: CPT | Mod: ME

## 2022-07-17 PROCEDURE — 96374 THER/PROPH/DIAG INJ IV PUSH: CPT | Mod: XU

## 2022-07-17 RX ORDER — ALUMINA, MAGNESIA, AND SIMETHICONE 2400; 2400; 240 MG/30ML; MG/30ML; MG/30ML
10 SUSPENSION ORAL 4 TIMES DAILY PRN
Qty: 560 ML | Refills: 0 | Status: SHIPPED | OUTPATIENT
Start: 2022-07-17 | End: 2023-01-18

## 2022-07-17 RX ORDER — ONDANSETRON 4 MG/1
4 TABLET, ORALLY DISINTEGRATING ORAL EVERY 6 HOURS PRN
Qty: 20 TABLET | Refills: 1 | Status: SHIPPED | OUTPATIENT
Start: 2022-07-17 | End: 2023-01-18

## 2022-07-17 RX ORDER — ONDANSETRON 2 MG/ML
4 INJECTION INTRAMUSCULAR; INTRAVENOUS ONCE
Status: COMPLETED | OUTPATIENT
Start: 2022-07-17 | End: 2022-07-17

## 2022-07-17 RX ADMIN — LIDOCAINE HYDROCHLORIDE 30 ML: 20 SOLUTION OROPHARYNGEAL at 10:07

## 2022-07-17 RX ADMIN — IOHEXOL 75 ML: 350 INJECTION, SOLUTION INTRAVENOUS at 11:06

## 2022-07-17 RX ADMIN — ONDANSETRON 4 MG: 2 INJECTION INTRAMUSCULAR; INTRAVENOUS at 10:07

## 2022-07-17 ASSESSMENT — ENCOUNTER SYMPTOMS
NAUSEA: 1
SHORTNESS OF BREATH: 0
CONSTIPATION: 1
FEVER: 0
ABDOMINAL PAIN: 1
VOMITING: 0

## 2022-07-17 ASSESSMENT — FIBROSIS 4 INDEX: FIB4 SCORE: 1.43

## 2022-07-17 NOTE — ED PROVIDER NOTES
ED Provider Note    Scribed for Shelbie Reynolds M.D. by Krysten King. 7/17/2022, 9:32 AM.    Primary care provider: Baltazar Rojas M.D.  Means of arrival: EMS  History obtained from: Patient  History limited by: None    CHIEF COMPLAINT  Chief Complaint   Patient presents with   • Abdominal Pain     X3wks, left lower abdomen   • Constipation       HPI  Maryam Gomez is a 77 y.o. female who presents to the Emergency Department for abdominal pain onset 3 weeks ago. Patient locates her pain to the epigastric, left upper and lower abdomen.  She describes the pain as a burning sensation that feels like her acid reflux but worse.  She was seen by her PCP on 6/23 and had a negative H Pylori test. She takes acid reflux medication and has doubled her medications without alleviation.  She also reports changing her diet and cutting out all alcohol and spicy foods but this has also not alleviated her symptoms.  Patient has associated constipation and nausea, but denies vomiting, chest pain, shortness of breath, or fevers. Patient endorses an endoscopy by [facility] many years ago however has not followed with them recently. She is currently being treated for skin cancer and expresses concerns for stomach cancer.     REVIEW OF SYSTEMS  Review of Systems   Constitutional: Negative for fever.   Respiratory: Negative for shortness of breath.    Cardiovascular: Negative for chest pain.   Gastrointestinal: Positive for abdominal pain, constipation and nausea. Negative for vomiting.   All other systems reviewed and are negative.       PAST MEDICAL HISTORY   has a past medical history of Anxiety, Arthritis, Cramp of limb (3/16/2012), Diastolic dysfunction (3/16/2012), DVT (deep venous thrombosis) ([facility]), Edema (3/16/2012), GERD (gastroesophageal reflux disease), Glaucoma, Heart burn, Hiatus hernia syndrome, Hypercholesteremia, Hyperlipidemia (3/16/2012), Hypertension, Hypothyroid, Indigestion, Osteopenia, Palpitations  (3/16/2012), and Polio.    SURGICAL HISTORY   has a past surgical history that includes hysterectomy laparoscopy (); other (); tonsillectomy (); hip arthroplasty total (2012); foot surgery (); shoulder decompression arthroscopic (Right, 2016); shoulder arthroscopy w/ rotator cuff repair (Right, 2016); colonoscopy (N/A, 2018); and closed reduction (Left, 2021).    SOCIAL HISTORY  Social History     Tobacco Use   • Smoking status: Former Smoker     Packs/day: 1.00     Years: 27.00     Pack years: 27.00     Types: Cigarettes     Quit date: 1989     Years since quittin.5   • Smokeless tobacco: Never Used   • Tobacco comment: Quit    Vaping Use   • Vaping Use: Never used   Substance Use Topics   • Alcohol use: Yes     Comment: 3 per day   • Drug use: No      Social History     Substance and Sexual Activity   Drug Use No       FAMILY HISTORY  Family History   Problem Relation Age of Onset   • Heart Attack Neg Hx    • Heart Disease Neg Hx    • Heart Failure Neg Hx        CURRENT MEDICATIONS  Current Outpatient Medications   Medication Instructions   • aspirin EC (ECOTRIN) 81 mg, Oral, EVERY EVENING   • atorvastatin (LIPITOR) 20 mg, Oral, EVERY EVENING   • B Complex Vitamins (VITAMIN B COMPLEX PO) 1 Tablet, Oral, DAILY   • Calcium-Magnesium-Vitamin D (CITRACAL CALCIUM+D PO) 1 Tablet, Oral, DAILY   • carvedilol (COREG) 6.25 mg, Oral, 2 TIMES DAILY   • famotidine (PEPCID) 20 mg, Oral, 2 TIMES DAILY   • Fish Oil 1,200 mg, Oral, DAILY   • Glucosamine-Chondroitin (COSAMIN DS PO) 1 Tablet, Oral, DAILY   • lansoprazole (PREVACID) 30 mg, Oral, 2 TIMES DAILY   • latanoprost (XALATAN) 0.005 % SOLN 1 Drop, Both Eyes, AFTER LUNCH   • levothyroxine (SYNTHROID) 100 mcg, Oral, SEE ADMIN INSTRUCTIONS, Monday, Tuesday, Wednesday, Thursday, Friday ONLY   • lisinopril (PRINIVIL) 10 mg, Oral, EVERY MORNING   • Multiple Vitamins-Minerals (MULTI COMPLETE PO) 1 Tablet, Oral, DAILY     "    ALLERGIES  No Known Allergies    PHYSICAL EXAM  VITAL SIGNS: /81   Pulse 76   Temp 36.6 °C (97.8 °F) (Temporal)   Resp 16   Ht 1.549 m (5' 1\")   Wt 77.1 kg (170 lb)   SpO2 96%   BMI 32.12 kg/m²   Vitals reviewed by myself.  Physical Exam  Nursing note and vitals reviewed.  Constitutional: Well-developed and well-nourished. No acute distress.   HENT: Head is normocephalic and atraumatic.  Eyes: extra-ocular movements intact  Cardiovascular: regular rate and regular rhythm. No murmur heard.  Pulmonary/Chest: Breath sounds normal. No wheezes or rales.   Abdominal: Soft and non-tender. No distention.  negative Reynolds's sign, negative McBurney's point tenderness  Musculoskeletal: Extremities exhibit normal range of motion without edema or tenderness.   Neurological: Awake and alert  Skin: Skin is warm and dry. No rash.       DIAGNOSTIC STUDIES  LABS  Labs Reviewed   CBC WITH DIFFERENTIAL - Abnormal; Notable for the following components:       Result Value    MCHC 33.0 (*)     MPV 8.0 (*)     All other components within normal limits   COMP METABOLIC PANEL - Abnormal; Notable for the following components:    Creatinine 0.46 (*)     All other components within normal limits   LIPASE   ESTIMATED GFR     All labs reviewed by me.    RADIOLOGY  CT-ABDOMEN-PELVIS WITH   Final Result      1.  Colonic diverticulosis without acute diverticulitis.   2.  Borderline hepatomegaly.        The radiologist's interpretation of all radiological studies have been reviewed by me.      REASSESSMENT    9:32 AM - Patient seen and examined at bedside. Discussed plan of care, including labs and imaging. Patient agrees to the plan of care. She will be treated with GI cocktail and zofran 4 mg.     11:41 AM - Patient was reevaluated at bedside. She is feeling improved after medications. Discussed lab and radiology results with the patient and informed them that there were no concerning findings. Recommended follow up with GI and " provided her with a referral. Prescribed Maalox and Zofran. Discussed return precautions. Patient will be discharged at this time. She verbalizes agreement with discharge and plan of care.     COURSE & MEDICAL DECISION MAKING  Nursing notes, Tracee ALEJANDRO reviewed in chart.    Patient is a 77-year-old female who comes in for evaluation of abdominal discomfort.  Differential diagnosis includes GERD, esophagitis, mass, obstruction.  Diagnostic work-up includes labs and CT of the abdomen.    Patient's initial vitals are within normal limits except for slight hypertension likely related to discomfort.  She is treated with GI cocktail and Zofran after which she feels greatly improved.  Labs returned unremarkable.  Imaging returns and demonstrates no acute findings.  At this time patient is reassured that no acute findings to explain her symptoms.  I did advise her she likely still needs endoscopy for further work-up and have referred her for GI for follow-up.  Patient is amenable to this plan.  She is provided with prescriptions for Maalox and Zofran for management of her discomfort.  She is given strict return precautions and discharged in stable condition.    The patient will return for new or worsening symptoms and is stable at the time of discharge.    DISPOSITION:  Patient will be discharged home in stable condition.    FOLLOW UP:  Baltazar Rojas M.D.  [address] [facility] [id number]  [id number]  [facility] [address]  [phone]            OUTPATIENT MEDICATIONS:  New Prescriptions    MAG HYDROX-AL HYDROX-SIMETH (MAALOX PLUS ES OR MYLANTA DS) 400-400-40 MG/5ML SUSPENSION    Take 10 mL by mouth 4 times a day as needed (abdominal pain/nausea).    ONDANSETRON (ZOFRAN ODT) 4 MG TABLET DISPERSIBLE    Take 1 Tablet by mouth every 6 hours as needed for Nausea.     FINAL IMPRESSION  1. Epigastric pain    2. Gastroesophageal reflux disease, unspecified whether esophagitis present    3. Nausea          IKrysten (Scribe), didi scribing for, and in  the presence of, Shelbie Reynolds M.D..    Electronically signed by: Krysten King (Scribe), 7/17/2022    I, Shelbie Reynolds M.D. personally performed the services described in this documentation, as scribed by Krysten King in my presence, and it is both accurate and complete.     The note accurately reflects work and decisions made by me.  Shelbie Reynolds M.D.  7/17/2022  3:18 PM

## 2022-07-17 NOTE — ED NOTES
Patient medicated per MAR. Discussed POC with patient. Patient agreeable. Denies any further needs at this time. Monitors in place and call light within reach.

## 2022-07-17 NOTE — ED NOTES
Patient returned from CT. Patient denies any pain at this time. Monitors in place and call light in place.

## 2022-07-17 NOTE — ED TRIAGE NOTES
Chief Complaint   Patient presents with   • Abdominal Pain     X3wks, left lower abdomen   • Constipation     Pt bib ems from home, c/o left lower abdominal pain with constipation x3wks.   fsbs 118

## 2022-07-17 NOTE — ED NOTES
In to discharge patient. Patient instructed to dress. Will wheel patient out to lobby when patient dressed.    TRANSFER - OUT REPORT:     Verbal report given to: (at bedside). Report consisted of patient's Situation, Background, Assessment and   Recommendations(SBAR). Opportunity for questions and clarification was provided. Patient transported with a Cardiac Cath Tech / Patient Care Tech. Patient transported to: recovery.

## 2022-10-14 ENCOUNTER — APPOINTMENT (RX ONLY)
Dept: URBAN - METROPOLITAN AREA CLINIC 36 | Facility: CLINIC | Age: 77
Setting detail: DERMATOLOGY
End: 2022-10-14

## 2022-10-14 PROBLEM — D04.39 CARCINOMA IN SITU OF SKIN OF OTHER PARTS OF FACE: Status: ACTIVE | Noted: 2022-10-14

## 2022-10-14 PROCEDURE — 13132 CMPLX RPR F/C/C/M/N/AX/G/H/F: CPT

## 2022-10-14 PROCEDURE — 17311 MOHS 1 STAGE H/N/HF/G: CPT

## 2022-10-14 PROCEDURE — 17312 MOHS ADDL STAGE: CPT

## 2022-10-14 PROCEDURE — ? MOHS SURGERY

## 2022-10-14 NOTE — PROCEDURE: MIPS QUALITY
Quality 265: Biopsy Follow-Up: Biopsy results reviewed, communicated, tracked, and documented
Quality 130: Documentation Of Current Medications In The Medical Record: Current Medications Documented
Quality 431: Preventive Care And Screening: Unhealthy Alcohol Use - Screening: Patient not identified as an unhealthy alcohol user when screened for unhealthy alcohol use using a systematic screening method
Quality 226: Preventive Care And Screening: Tobacco Use: Screening And Cessation Intervention: Patient screened for tobacco use and is an ex/non-smoker
Quality 111:Pneumonia Vaccination Status For Older Adults: Pneumococcal Vaccination Previously Received
Detail Level: Detailed

## 2022-10-14 NOTE — PROCEDURE: MOHS SURGERY
Otolaryngologist Procedure Text (B): After obtaining clear surgical margins the patient was sent to otolaryngology for surgical repair.  The patient understands they will receive post-surgical care and follow-up from the referring physician's office.
Did You Provide Opioid Counseling: No
Crescentic Intermediate Repair Preamble Text (Leave Blank If You Do Not Want): Undermining was performed with blunt dissection.
Bilateral Helical Rim Advancement Flap Text: The defect edges were debeveled with a #15 blade scalpel.  Given the location of the defect and the proximity to free margins (helical rim) a bilateral helical rim advancement flap was deemed most appropriate.  Using a sterile surgical marker, the appropriate advancement flaps were drawn incorporating the defect and placing the expected incisions between the helical rim and antihelix where possible.  The area thus outlined was incised through and through with a #15 scalpel blade.  With a skin hook and iris scissors, the flaps were gently and sharply undermined and freed up.
Consent (Marginal Mandibular)/Introductory Paragraph: The rationale for Mohs was explained to the patient and consent was obtained. The risks, benefits and alternatives to therapy were discussed in detail. Specifically, the risks of damage to the marginal mandibular branch of the facial nerve, infection, scarring, bleeding, prolonged wound healing, incomplete removal, allergy to anesthesia, and recurrence were addressed. Prior to the procedure, the treatment site was clearly identified and confirmed by the patient. All components of Universal Protocol/PAUSE Rule completed.
Special Stains Stage 15 - Results: Base On Clearance Noted Above
Validate Note Data (See Information Below): Yes
Surgeon Performing Repair (Optional): Jaren Caballero MD
Provider Procedure Text (B): After obtaining clear surgical margins the defect was repaired by another provider.
Quadrants Reporting?: 0
Bilobed Flap Text: The defect edges were debeveled with a #15 scalpel blade.  Given the location of the defect and the proximity to free margins a bilobe flap was deemed most appropriate.  Using a sterile surgical marker, an appropriate bilobe flap drawn around the defect.    The area thus outlined was incised deep to adipose tissue with a #15 scalpel blade.  The skin margins were undermined to an appropriate distance in all directions utilizing iris scissors.
Consent (Near Eyelid Margin)/Introductory Paragraph: The rationale for Mohs was explained to the patient and consent was obtained. The risks, benefits and alternatives to therapy were discussed in detail. Specifically, the risks of ectropion or eyelid deformity, infection, scarring, bleeding, prolonged wound healing, incomplete removal, allergy to anesthesia, nerve injury and recurrence were addressed. Prior to the procedure, the treatment site was clearly identified and confirmed by the patient. All components of Universal Protocol/PAUSE Rule completed.
Plastic Surgeon Procedure Text (C): After obtaining clear surgical margins the patient was sent to plastics for surgical repair.  The patient understands they will receive post-surgical care and follow-up from the referring physician's office.
Inflammation Suggestive Of Cancer Camouflage Histology Text: There was a dense lymphocytic infiltrate which prevented adequate histologic evaluation of adjacent structures.
Crescentic Complex Repair Preamble Text (Leave Blank If You Do Not Want): Extensive wide undermining was performed.
Graft Donor Site Bandage (Optional-Leave Blank If You Don't Want In Note): Aquaplast was fitted to the graft site and sewn into place. A pressure bandage were applied to the donor site and over the aquaplast bolster.
Oculoplastic Surgeon Procedure Text (A): After obtaining clear surgical margins the patient was sent to oculoplastics for surgical repair.  The patient understands they will receive post-surgical care and follow-up from the referring physician's office.
Secondary Intention Text (Leave Blank If You Do Not Want): The defect will heal with secondary intention.
Mohs Case Number: [id number]
Mid-Level Procedure Text (C): After obtaining clear surgical margins the patient was sent to a mid-level provider for surgical repair.  The patient understands they will receive post-surgical care and follow-up from the mid-level provider.
Ear Star Wedge Flap Text: The defect edges were debeveled with a #15 blade scalpel.  Given the location of the defect and the proximity to free margins (helical rim) an ear star wedge flap was deemed most appropriate.  Using a sterile surgical marker, the appropriate flap was drawn incorporating the defect and placing the expected incisions between the helical rim and antihelix where possible.  The area thus outlined was incised through and through with a #15 scalpel blade.
Tissue Cultured Epidermal Autograft Text: The defect edges were debeveled with a #15 scalpel blade.  Given the location of the defect, shape of the defect and the proximity to free margins a tissue cultured epidermal autograft was deemed most appropriate.  The graft was then trimmed to fit the size of the defect.  The graft was then placed in the primary defect and oriented appropriately.
Adjacent Tissue Transfer Text: The defect edges were debeveled with a #15 scalpel blade.  Given the location of the defect and the proximity to free margins an adjacent tissue transfer was deemed most appropriate.  Using a sterile surgical marker, an appropriate flap was drawn incorporating the defect and placing the expected incisions within the relaxed skin tension lines where possible.    The area thus outlined was incised deep to adipose tissue with a #15 scalpel blade.  The skin margins were undermined to an appropriate distance in all directions utilizing iris scissors.
Stage 6: Additional Anesthesia Type: 1% lidocaine with epinephrine
Number Of Stages: 2
Area L Indication Text: Tumors in this location are included in Area L (trunk and extremities).  Mohs surgery is indicated for larger tumors, or tumors with aggressive histologic features, in these anatomic locations.
Cheek-To-Nose Interpolation Flap Text: A decision was made to reconstruct the defect utilizing an interpolation axial flap and a staged reconstruction.  A telfa template was made of the defect.  This telfa template was then used to outline the Cheek-To-Nose Interpolation flap.  The donor area for the pedicle flap was then injected with anesthesia.  The flap was excised through the skin and subcutaneous tissue down to the layer of the underlying musculature.  The interpolation flap was carefully excised within this deep plane to maintain its blood supply.  The edges of the donor site were undermined.   The donor site was closed in a primary fashion.  The pedicle was then rotated into position and sutured.  Once the tube was sutured into place, adequate blood supply was confirmed with blanching and refill.  The pedicle was then wrapped with xeroform gauze and dressed appropriately with a telfa and gauze bandage to ensure continued blood supply and protect the attached pedicle.
Tarsorrhaphy Text: A tarsorrhaphy was performed using Frost sutures.
Keystone Flap Text: The defect edges were debeveled with a #15 scalpel blade.  Given the location of the defect, shape of the defect a keystone flap was deemed most appropriate.  Using a sterile surgical marker, an appropriate keystone flap was drawn incorporating the defect, outlining the appropriate donor tissue and placing the expected incisions within the relaxed skin tension lines where possible. The area thus outlined was incised deep to adipose tissue with a #15 scalpel blade.  The skin margins were undermined to an appropriate distance in all directions around the primary defect and laterally outward around the flap utilizing iris scissors.
Hemigard Postcare Instructions: The HEMIGARD strips are to remain completely dry for at least 5-7 days.
Repair Type: Complex Repair
Graft Donor Site Epidermal Sutures (Optional): 5-0 Surgipro
Wound Care (No Sutures): Petrolatum
Mucosal Advancement Flap Text: Given the location of the defect, shape of the defect and the proximity to free margins a mucosal advancement flap was deemed most appropriate. Incisions were made with a 15 blade scalpel in the appropriate fashion along the cutaneous vermilion border and the mucosal lip. The remaining actinically damaged mucosal tissue was excised.  The mucosal advancement flap was then elevated to the gingival sulcus with care taken to preserve the neurovascular structures and advanced into the primary defect. Care was taken to ensure that precise realignment of the vermilion border was achieved.
Nasalis-Muscle-Based Myocutaneous Island Pedicle Flap Text: Using a #15 blade, an incision was made around the donor flap to the level of the nasalis muscle. Wide lateral undermining was then performed in both the subcutaneous plane above the nasalis muscle, and in a submuscular plane just above periosteum. This allowed the formation of a free nasalis muscle axial pedicle (based on the angular artery) which was still attached to the actual cutaneous flap, increasing its mobility and vascular viability. Hemostasis was obtained with pinpoint electrocoagulation. The flap was mobilized into position and the pivotal anchor points positioned and stabilized with buried interrupted sutures. Subcutaneous and dermal tissues were closed in a multilayered fashion with sutures. Tissue redundancies were excised, and the epidermal edges were apposed without significant tension and sutured with sutures.
Advancement Flap (Single) Text: The defect edges were debeveled with a #15 scalpel blade.  Given the location of the defect and the proximity to free margins a single advancement flap was deemed most appropriate.  Using a sterile surgical marker, an appropriate advancement flap was drawn incorporating the defect and placing the expected incisions within the relaxed skin tension lines where possible.    The area thus outlined was incised deep to adipose tissue with a #15 scalpel blade.  The skin margins were undermined to an appropriate distance in all directions utilizing iris scissors.
Asc Procedure Text (A): After obtaining clear surgical margins the patient was sent to an ASC for surgical repair.  The patient understands they will receive post-surgical care and follow-up from the ASC physician.
Mohs Histo Method Verbiage: Each section was then chromacoded and processed in the Mohs lab using the Mohs protocol and submitted for frozen section.
Medical Necessity Statement: Based on my medical judgement, Mohs surgery is the most appropriate treatment for this cancer compared to other treatments.
Was The Patient On Physician Recommended Anticoagulation Therapy?: Please Select the Appropriate Response
Where Do You Want The Question To Include Opioid Counseling Located?: Case Summary Tab
Alternatives Discussed Intro (Do Not Add Period): I discussed alternative treatments to Mohs surgery and specifically discussed the risks and benefits of
Epidermal Closure Graft Donor Site (Optional): running
Consent 1/Introductory Paragraph: The rationale for Mohs was explained to the patient and consent was obtained. The risks, benefits and alternatives to therapy were discussed in detail. Specifically, the risks of infection, scarring, bleeding, prolonged wound healing, incomplete removal, allergy to anesthesia, nerve injury and recurrence were addressed. Prior to the procedure, the treatment site was clearly identified and confirmed by the patient. All components of Universal Protocol/PAUSE Rule completed.
Cheiloplasty (Complex) Text: A decision was made to reconstruct the defect with a  cheiloplasty.  The defect was undermined extensively.  Additional obicularis oris muscle was excised with a 15 blade scalpel.  The defect was converted into a full thickness wedge to facilite a better cosmetic result.  Small vessels were then tied off with 5-0 monocyrl. The obicularis oris, superficial fascia, adipose and dermis were then reapproximated.  After the deeper layers were approximated the epidermis was reapproximated with particular care given to realign the vermilion border.
Staged Advancement Flap Text: The defect edges were debeveled with a #15 scalpel blade.  Given the location of the defect, shape of the defect and the proximity to free margins a staged advancement flap was deemed most appropriate.  Using a sterile surgical marker, an appropriate advancement flap was drawn incorporating the defect and placing the expected incisions within the relaxed skin tension lines where possible. The area thus outlined was incised deep to adipose tissue with a #15 scalpel blade.  The skin margins were undermined to an appropriate distance in all directions utilizing iris scissors.
Previous Accession (Optional): [id number]
Banner Transposition Flap Text: The defect edges were debeveled with a #15 scalpel blade.  Given the location of the defect and the proximity to free margins a Banner transposition flap was deemed most appropriate.  Using a sterile surgical marker, an appropriate flap drawn around the defect. The area thus outlined was incised deep to adipose tissue with a #15 scalpel blade.  The skin margins were undermined to an appropriate distance in all directions utilizing iris scissors.
Chonodrocutaneous Helical Advancement Flap Text: The defect edges were debeveled with a #15 scalpel blade.  Given the location of the defect and the proximity to free margins a chondrocutaneous helical advancement flap was deemed most appropriate.  Using a sterile surgical marker, the appropriate advancement flap was drawn incorporating the defect and placing the expected incisions within the relaxed skin tension lines where possible.    The area thus outlined was incised deep to adipose tissue with a #15 scalpel blade.  The skin margins were undermined to an appropriate distance in all directions utilizing iris scissors.
Double O-Z Flap Text: The defect edges were debeveled with a #15 scalpel blade.  Given the location of the defect, shape of the defect and the proximity to free margins a Double O-Z flap was deemed most appropriate.  Using a sterile surgical marker, an appropriate transposition flap was drawn incorporating the defect and placing the expected incisions within the relaxed skin tension lines where possible. The area thus outlined was incised deep to adipose tissue with a #15 scalpel blade.  The skin margins were undermined to an appropriate distance in all directions utilizing iris scissors.
Consent (Lip)/Introductory Paragraph: The rationale for Mohs was explained to the patient and consent was obtained. The risks, benefits and alternatives to therapy were discussed in detail. Specifically, the risks of lip deformity, changes in the oral aperture, infection, scarring, bleeding, prolonged wound healing, incomplete removal, allergy to anesthesia, nerve injury and recurrence were addressed. Prior to the procedure, the treatment site was clearly identified and confirmed by the patient. All components of Universal Protocol/PAUSE Rule completed.
Orbicularis Oris Muscle Flap Text: The defect edges were debeveled with a #15 scalpel blade.  Given that the defect affected the competency of the oral sphincter an orbicularis oris muscle flap was deemed most appropriate to restore this competency and normal muscle function.  Using a sterile surgical marker, an appropriate flap was drawn incorporating the defect. The area thus outlined was incised with a #15 scalpel blade.
Spiral Flap Text: The defect edges were debeveled with a #15 scalpel blade.  Given the location of the defect, shape of the defect and the proximity to free margins a spiral flap was deemed most appropriate.  Using a sterile surgical marker, an appropriate rotation flap was drawn incorporating the defect and placing the expected incisions within the relaxed skin tension lines where possible. The area thus outlined was incised deep to adipose tissue with a #15 scalpel blade.  The skin margins were undermined to an appropriate distance in all directions utilizing iris scissors.
Manual Repair Warning Statement: We plan on removing the manually selected variable below in favor of our much easier automatic structured text blocks found in the previous tab. We decided to do this to help make the flow better and give you the full power of structured data. Manual selection is never going to be ideal in our platform and I would encourage you to avoid using manual selection from this point on, especially since I will be sunsetting this feature. It is important that you do one of two things with the customized text below. First, you can save all of the text in a word file so you can have it for future reference. Second, transfer the text to the appropriate area in the Library tab. Lastly, if there is a flap or graft type which we do not have you need to let us know right away so I can add it in before the variable is hidden. No need to panic, we plan to give you roughly 6 months to make the change.
Estimated Blood Loss (Cc): minimal
Closure 4 Information: This tab is for additional flaps and grafts above and beyond our usual structured repairs.  Please note if you enter information here it will not currently bill and you will need to add the billing information manually.
Graft Cartilage Fenestration Text: The cartilage was fenestrated with a 2mm punch biopsy to help facilitate graft survival and healing.
Bcc Histology Text: There were numerous aggregates of basaloid cells.
O-Z Plasty Text: The defect edges were debeveled with a #15 scalpel blade.  Given the location of the defect, shape of the defect and the proximity to free margins an O-Z plasty (double transposition flap) was deemed most appropriate.  Using a sterile surgical marker, the appropriate transposition flaps were drawn incorporating the defect and placing the expected incisions within the relaxed skin tension lines where possible.    The area thus outlined was incised deep to adipose tissue with a #15 scalpel blade.  The skin margins were undermined to an appropriate distance in all directions utilizing iris scissors.  Hemostasis was achieved with electrocautery.  The flaps were then transposed into place, one clockwise and the other counterclockwise, and anchored with interrupted buried subcutaneous sutures.
Dermal Autograft Text: The defect edges were debeveled with a #15 scalpel blade.  Given the location of the defect, shape of the defect and the proximity to free margins a dermal autograft was deemed most appropriate.  Using a sterile surgical marker, the primary defect shape was transferred to the donor site. The area thus outlined was incised deep to adipose tissue with a #15 scalpel blade.  The harvested graft was then trimmed of adipose and epidermal tissue until only dermis was left.  The skin graft was then placed in the primary defect and oriented appropriately.
Surgical Defect Length In Cm (Optional): 2.4
Transposition Flap Text: The defect edges were debeveled with a #15 scalpel blade.  Given the location of the defect and the proximity to free margins a transposition flap was deemed most appropriate.  Using a sterile surgical marker, an appropriate transposition flap was drawn incorporating the defect.    The area thus outlined was incised deep to adipose tissue with a #15 scalpel blade.  The skin margins were undermined to an appropriate distance in all directions utilizing iris scissors.
Hemigard Intro: Due to skin fragility and wound tension, it was decided to use HEMIGARD adhesive retention suture devices to permit a linear closure. The skin was cleaned and dried for a 6cm distance away from the wound. Excessive hair, if present, was removed to allow for adhesion.
Surgical Defect Width In Cm (Optional): 1.6
Muscle Hinge Flap Text: The defect edges were debeveled with a #15 scalpel blade.  Given the size, depth and location of the defect and the proximity to free margins a muscle hinge flap was deemed most appropriate.  Using a sterile surgical marker, an appropriate hinge flap was drawn incorporating the defect. The area thus outlined was incised with a #15 scalpel blade.  The skin margins were undermined to an appropriate distance in all directions utilizing iris scissors.
Retention Suture Text: Retention sutures were placed to support the closure and prevent dehiscence.
X Size Of Lesion In Cm (Optional): 0.7
Nostril Rim Text: The closure involved the nostril rim.
O-T Advancement Flap Text: The defect edges were debeveled with a #15 scalpel blade.  Given the location of the defect, shape of the defect and the proximity to free margins an O-T advancement flap was deemed most appropriate.  Using a sterile surgical marker, an appropriate advancement flap was drawn incorporating the defect and placing the expected incisions within the relaxed skin tension lines where possible.    The area thus outlined was incised deep to adipose tissue with a #15 scalpel blade.  The skin margins were undermined to an appropriate distance in all directions utilizing iris scissors.
Non-Graft Cartilage Fenestration Text: The cartilage was fenestrated with a 2mm punch biopsy to help facilitate healing.
H Plasty Text: Given the location of the defect, shape of the defect and the proximity to free margins a H-plasty was deemed most appropriate for repair.  Using a sterile surgical marker, the appropriate advancement arms of the H-plasty were drawn incorporating the defect and placing the expected incisions within the relaxed skin tension lines where possible. The area thus outlined was incised deep to adipose tissue with a #15 scalpel blade. The skin margins were undermined to an appropriate distance in all directions utilizing iris scissors.  The opposing advancement arms were then advanced into place in opposite direction and anchored with interrupted buried subcutaneous sutures.
Split-Thickness Skin Graft Text: The defect edges were debeveled with a #15 scalpel blade.  Given the location of the defect, shape of the defect and the proximity to free margins a split thickness skin graft was deemed most appropriate.  Using a sterile surgical marker, the primary defect shape was transferred to the donor site. The split thickness graft was then harvested.  The skin graft was then placed in the primary defect and oriented appropriately.
Subsequent Stages Histo Method Verbiage: Using a similar technique to that described above, a thin layer of tissue was removed from all areas where tumor was visible on the previous stage.  The tissue was again oriented, mapped, dyed, and processed as above.
Bi-Rhombic Flap Text: The defect edges were debeveled with a #15 scalpel blade.  Given the location of the defect and the proximity to free margins a bi-rhombic flap was deemed most appropriate.  Using a sterile surgical marker, an appropriate rhombic flap was drawn incorporating the defect. The area thus outlined was incised deep to adipose tissue with a #15 scalpel blade.  The skin margins were undermined to an appropriate distance in all directions utilizing iris scissors.
Postop Diagnosis: same
Partial Purse String (Intermediate) Text: Given the location of the defect and the characteristics of the surrounding skin an intermediate purse string closure was deemed most appropriate.  Undermining was performed circumfirentially around the surgical defect.  A purse string suture was then placed and tightened. Wound tension only allowed a partial closure of the circular defect.
Mart-1 - Negative Histology Text: MART-1 staining demonstrates a normal density and pattern of melanocytes along the dermal-epidermal junction. The surgical margins are negative for tumor cells.
Rotation Flap Text: The defect edges were debeveled with a #15 scalpel blade.  Given the location of the defect, shape of the defect and the proximity to free margins a rotation flap was deemed most appropriate.  Using a sterile surgical marker, an appropriate rotation flap was drawn incorporating the defect and placing the expected incisions within the relaxed skin tension lines where possible.    The area thus outlined was incised deep to adipose tissue with a #15 scalpel blade.  The skin margins were undermined to an appropriate distance in all directions utilizing iris scissors.
Bcc Infiltrative Histology Text: There were numerous aggregates of basaloid cells demonstrating an infiltrative pattern.
Double Island Pedicle Flap Text: The defect edges were debeveled with a #15 scalpel blade.  Given the location of the defect, shape of the defect and the proximity to free margins a double island pedicle advancement flap was deemed most appropriate.  Using a sterile surgical marker, an appropriate advancement flap was drawn incorporating the defect, outlining the appropriate donor tissue and placing the expected incisions within the relaxed skin tension lines where possible.    The area thus outlined was incised deep to adipose tissue with a #15 scalpel blade.  The skin margins were undermined to an appropriate distance in all directions around the primary defect and laterally outward around the island pedicle utilizing iris scissors.  There was minimal undermining beneath the pedicle flap.
Number Of Hemigard Strips Per Side: 1
Unna Boot Text: An Unna boot was placed to help immobilize the limb and facilitate more rapid healing.
Consent (Spinal Accessory)/Introductory Paragraph: The rationale for Mohs was explained to the patient and consent was obtained. The risks, benefits and alternatives to therapy were discussed in detail. Specifically, the risks of damage to the spinal accessory nerve, infection, scarring, bleeding, prolonged wound healing, incomplete removal, allergy to anesthesia, and recurrence were addressed. Prior to the procedure, the treatment site was clearly identified and confirmed by the patient. All components of Universal Protocol/PAUSE Rule completed.
Cartilage Graft Text: The defect edges were debeveled with a #15 scalpel blade.  Given the location of the defect, shape of the defect, the fact the defect involved a full thickness cartilage defect a cartilage graft was deemed most appropriate.  An appropriate donor site was identified, cleansed, and anesthetized. The cartilage graft was then harvested and transferred to the recipient site, oriented appropriately and then sutured into place.  The secondary defect was then repaired using a primary closure.
Mauc Instructions: By selecting yes to the question below the MAUC number will be added into the note.  This will be calculated automatically based on the diagnosis chosen, the size entered, the body zone selected (H,M,L) and the specific indications you chose. You will also have the option to override the Mohs AUC if you disagree with the automatically calculated number and this option is found in the Case Summary tab.
Skin Substitute Text: The defect edges were debeveled with a #15 scalpel blade.  Given the location of the defect, shape of the defect and the proximity to free margins a skin substitute graft was deemed most appropriate.  The graft material was trimmed to fit the size of the defect. The graft was then placed in the primary defect and oriented appropriately.
Repair Anesthesia Method: local infiltration
Consent 3/Introductory Paragraph: I gave the patient a chance to ask questions they had about the procedure.  Following this I explained the Mohs procedure and consent was obtained. The risks, benefits and alternatives to therapy were discussed in detail. Specifically, the risks of infection, scarring, bleeding, prolonged wound healing, incomplete removal, allergy to anesthesia, nerve injury and recurrence were addressed. Prior to the procedure, the treatment site was clearly identified and confirmed by the patient. All components of Universal Protocol/PAUSE Rule completed.
Eye Protection Verbiage: Before proceeding with the stage, a plastic scleral shield was inserted. The globe was anesthetized with a few drops of 1% lidocaine with 1:100,000 epinephrine. Then, an appropriate sized scleral shield was chosen and coated with lacrilube ointment. The shield was gently inserted and left in place for the duration of each stage. After the stage was completed, the shield was gently removed.
O-Z Flap Text: The defect edges were debeveled with a #15 scalpel blade.  Given the location of the defect, shape of the defect and the proximity to free margins an O-Z flap was deemed most appropriate.  Using a sterile surgical marker, an appropriate transposition flap was drawn incorporating the defect and placing the expected incisions within the relaxed skin tension lines where possible. The area thus outlined was incised deep to adipose tissue with a #15 scalpel blade.  The skin margins were undermined to an appropriate distance in all directions utilizing iris scissors.
Donor Site Anesthesia Type: same as repair anesthesia
Surgeon/Pathologist Verbiage (Will Incorporate Name Of Surgeon From Intro If Not Blank): operated in two distinct and integrated capacities as the surgeon and pathologist.
Full Thickness Lip Wedge Repair (Flap) Text: Given the location of the defect and the proximity to free margins a full thickness wedge repair was deemed most appropriate.  Using a sterile surgical marker, the appropriate repair was drawn incorporating the defect and placing the expected incisions perpendicular to the vermilion border.  The vermilion border was also meticulously outlined to ensure appropriate reapproximation during the repair.  The area thus outlined was incised through and through with a #15 scalpel blade.  The muscularis and dermis were reaproximated with deep sutures following hemostasis. Care was taken to realign the vermilion border before proceeding with the superficial closure.  Once the vermilion was realigned the superfical and mucosal closure was finished.
Rhombic Flap Text: The defect edges were debeveled with a #15 scalpel blade.  Given the location of the defect and the proximity to free margins a rhombic flap was deemed most appropriate.  Using a sterile surgical marker, an appropriate rhombic flap was drawn incorporating the defect.    The area thus outlined was incised deep to adipose tissue with a #15 scalpel blade.  The skin margins were undermined to an appropriate distance in all directions utilizing iris scissors.
Crescentic Advancement Flap Text: The defect edges were debeveled with a #15 scalpel blade.  Given the location of the defect and the proximity to free margins a crescentic advancement flap was deemed most appropriate.  Using a sterile surgical marker, the appropriate advancement flap was drawn incorporating the defect and placing the expected incisions within the relaxed skin tension lines where possible.    The area thus outlined was incised deep to adipose tissue with a #15 scalpel blade.  The skin margins were undermined to an appropriate distance in all directions utilizing iris scissors.
Suturegard Retention Suture: 0-0 Nylon
Anesthesia Type: 1% lidocaine with epinephrine and 0.25% bupivacaine in a 2:3 ration buffered with 8.4% sodium bicarbonate
Localized Dermabrasion With Wire Brush Text: The patient was draped in routine manner.  Localized dermabrasion using 3 x 17 mm wire brush was performed in routine manner to papillary dermis. This spot dermabrasion is being performed to complete skin cancer reconstruction. It also will eliminate the other sun damaged precancerous cells that are known to be part of the regional effect of a lifetime's worth of sun exposure. This localized dermabrasion is therapeutic and should not be considered cosmetic in any regard.
Rhomboid Transposition Flap Text: The defect edges were debeveled with a #15 scalpel blade.  Given the location of the defect and the proximity to free margins a rhomboid transposition flap was deemed most appropriate.  Using a sterile surgical marker, an appropriate rhomboid flap was drawn incorporating the defect.    The area thus outlined was incised deep to adipose tissue with a #15 scalpel blade.  The skin margins were undermined to an appropriate distance in all directions utilizing iris scissors.
Consent (Temporal Branch)/Introductory Paragraph: The rationale for Mohs was explained to the patient and consent was obtained. The risks, benefits and alternatives to therapy were discussed in detail. Specifically, the risks of damage to the temporal branch of the facial nerve, infection, scarring, bleeding, prolonged wound healing, incomplete removal, allergy to anesthesia, and recurrence were addressed. Prior to the procedure, the treatment site was clearly identified and confirmed by the patient. All components of Universal Protocol/PAUSE Rule completed.
Star Wedge Flap Text: The defect edges were debeveled with a #15 scalpel blade.  Given the location of the defect, shape of the defect and the proximity to free margins a star wedge flap was deemed most appropriate.  Using a sterile surgical marker, an appropriate rotation flap was drawn incorporating the defect and placing the expected incisions within the relaxed skin tension lines where possible. The area thus outlined was incised deep to adipose tissue with a #15 scalpel blade.  The skin margins were undermined to an appropriate distance in all directions utilizing iris scissors.
Consent 2/Introductory Paragraph: Mohs surgery was explained to the patient and consent was obtained. The risks, benefits and alternatives to therapy were discussed in detail. Specifically, the risks of infection, scarring, bleeding, prolonged wound healing, incomplete removal, allergy to anesthesia, nerve injury and recurrence were addressed. Prior to the procedure, the treatment site was clearly identified and confirmed by the patient. All components of Universal Protocol/PAUSE Rule completed.
Undermining Type: Entire Wound
Cheek Interpolation Flap Text: A decision was made to reconstruct the defect utilizing an interpolation axial flap and a staged reconstruction.  A telfa template was made of the defect.  This telfa template was then used to outline the Cheek Interpolation flap.  The donor area for the pedicle flap was then injected with anesthesia.  The flap was excised through the skin and subcutaneous tissue down to the layer of the underlying musculature.  The interpolation flap was carefully excised within this deep plane to maintain its blood supply.  The edges of the donor site were undermined.   The donor site was closed in a primary fashion.  The pedicle was then rotated into position and sutured.  Once the tube was sutured into place, adequate blood supply was confirmed with blanching and refill.  The pedicle was then wrapped with xeroform gauze and dressed appropriately with a telfa and gauze bandage to ensure continued blood supply and protect the attached pedicle.
A-T Advancement Flap Text: The defect edges were debeveled with a #15 scalpel blade.  Given the location of the defect, shape of the defect and the proximity to free margins an A-T advancement flap was deemed most appropriate.  Using a sterile surgical marker, an appropriate advancement flap was drawn incorporating the defect and placing the expected incisions within the relaxed skin tension lines where possible.    The area thus outlined was incised deep to adipose tissue with a #15 scalpel blade.  The skin margins were undermined to an appropriate distance in all directions utilizing iris scissors.
Wound Care: Vaseline
Area M Indication Text: Tumors in this location are included in Area M (cheek, forehead, scalp, neck, jawline and pretibial skin).  Mohs surgery is indicated for tumors in these anatomic locations.
Mohs Method Verbiage: An incision at a 45 degree angle following the standard Mohs approach was done and the specimen was harvested as a microscopic controlled layer.
Paramedian Forehead Flap Text: A decision was made to reconstruct the defect utilizing an interpolation axial flap and a staged reconstruction.  A telfa template was made of the defect.  This telfa template was then used to outline the paramedian forehead pedicle flap.  The donor area for the pedicle flap was then injected with anesthesia.  The flap was excised through the skin and subcutaneous tissue down to the layer of the underlying musculature.  The pedicle flap was carefully excised within this deep plane to maintain its blood supply.  The edges of the donor site were undermined.   The donor site was closed in a primary fashion.  The pedicle was then rotated into position and sutured.  Once the tube was sutured into place, adequate blood supply was confirmed with blanching and refill.  The pedicle was then wrapped with xeroform gauze and dressed appropriately with a telfa and gauze bandage to ensure continued blood supply and protect the attached pedicle.
Dorsal Nasal Flap Text: The defect edges were debeveled with a #15 scalpel blade.  Given the location of the defect and the proximity to free margins a dorsal nasal flap was deemed most appropriate.  Using a sterile surgical marker, an appropriate dorsal nasal flap was drawn around the defect.    The area thus outlined was incised deep to adipose tissue with a #15 scalpel blade.  The skin margins were undermined to an appropriate distance in all directions utilizing iris scissors.
Location Indication Override (Is Already Calculated Based On Selected Body Location): Area M
Purse String (Intermediate) Text: Given the location of the defect and the characteristics of the surrounding skin a purse string intermediate closure was deemed most appropriate.  Undermining was performed circumfirentially around the surgical defect.  A purse string suture was then placed and tightened.
Cheiloplasty (Less Than 50%) Text: A decision was made to reconstruct the defect with a  cheiloplasty.  The defect was undermined extensively.  Additional obicularis oris muscle was excised with a 15 blade scalpel.  The defect was converted into a full thickness wedge, of less than 50% of the vertical height of the lip, to facilite a better cosmetic result.  Small vessels were then tied off with 5-0 monocyrl. The obicularis oris, superficial fascia, adipose and dermis were then reapproximated.  After the deeper layers were approximated the epidermis was reapproximated with particular care given to realign the vermilion border.
Information: Selecting Yes will display possible errors in your note based on the variables you have selected. This validation is only offered as a suggestion for you. PLEASE NOTE THAT THE VALIDATION TEXT WILL BE REMOVED WHEN YOU FINALIZE YOUR NOTE. IF YOU WANT TO FAX A PRELIMINARY NOTE YOU WILL NEED TO TOGGLE THIS TO 'NO' IF YOU DO NOT WANT IT IN YOUR FAXED NOTE.
V-Y Plasty Text: The defect edges were debeveled with a #15 scalpel blade.  Given the location of the defect, shape of the defect and the proximity to free margins an V-Y advancement flap was deemed most appropriate.  Using a sterile surgical marker, an appropriate advancement flap was drawn incorporating the defect and placing the expected incisions within the relaxed skin tension lines where possible.    The area thus outlined was incised deep to adipose tissue with a #15 scalpel blade.  The skin margins were undermined to an appropriate distance in all directions utilizing iris scissors.
Area H Indication Text: Tumors in this location are included in Area H (eyelids, eyebrows, nose, lips, chin, ear, pre-auricular, post-auricular, temple, genitalia, hands, feet, ankles and areola).  Tissue conservation is critical in these anatomic locations.
Helical Rim Text: The closure involved the helical rim.
Nasal Turnover Hinge Flap Text: The defect edges were debeveled with a #15 scalpel blade.  Given the size, depth, location of the defect and the defect being full thickness a nasal turnover hinge flap was deemed most appropriate.  Using a sterile surgical marker, an appropriate hinge flap was drawn incorporating the defect. The area thus outlined was incised with a #15 scalpel blade. The flap was designed to recreate the nasal mucosal lining and the alar rim. The skin margins were undermined to an appropriate distance in all directions utilizing iris scissors.
Interpolation Flap Text: A decision was made to reconstruct the defect utilizing an interpolation axial flap and a staged reconstruction.  A telfa template was made of the defect.  This telfa template was then used to outline the interpolation flap.  The donor area for the pedicle flap was then injected with anesthesia.  The flap was excised through the skin and subcutaneous tissue down to the layer of the underlying musculature.  The interpolation flap was carefully excised within this deep plane to maintain its blood supply.  The edges of the donor site were undermined.   The donor site was closed in a primary fashion.  The pedicle was then rotated into position and sutured.  Once the tube was sutured into place, adequate blood supply was confirmed with blanching and refill.  The pedicle was then wrapped with xeroform gauze and dressed appropriately with a telfa and gauze bandage to ensure continued blood supply and protect the attached pedicle.
No Repair - Repaired With Adjacent Surgical Defect Text (Leave Blank If You Do Not Want): After obtaining clear surgical margins the defect was repaired concurrently with another surgical defect which was in close approximation.
Suturegard Body: The suture ends were repeatedly re-tightened and re-clamped to achieve the desired tissue expansion.
Peng Advancement Flap Text: The defect edges were debeveled with a #15 scalpel blade.  Given the location of the defect, shape of the defect and the proximity to free margins a Peng advancement flap was deemed most appropriate.  Using a sterile surgical marker, an appropriate advancement flap was drawn incorporating the defect and placing the expected incisions within the relaxed skin tension lines where possible. The area thus outlined was incised deep to adipose tissue with a #15 scalpel blade.  The skin margins were undermined to an appropriate distance in all directions utilizing iris scissors.
Dressing: pressure dressing with telfa
Island Pedicle Flap-Requiring Vessel Identification Text: The defect edges were debeveled with a #15 scalpel blade.  Given the location of the defect, shape of the defect and the proximity to free margins an island pedicle advancement flap was deemed most appropriate.  Using a sterile surgical marker, an appropriate advancement flap was drawn, based on the axial vessel mentioned above, incorporating the defect, outlining the appropriate donor tissue and placing the expected incisions within the relaxed skin tension lines where possible.    The area thus outlined was incised deep to adipose tissue with a #15 scalpel blade.  The skin margins were undermined to an appropriate distance in all directions around the primary defect and laterally outward around the island pedicle utilizing iris scissors.  There was minimal undermining beneath the pedicle flap.
Consent Type: Consent 1 (Standard)
Composite Graft Text: The defect edges were debeveled with a #15 scalpel blade.  Given the location of the defect, shape of the defect, the proximity to free margins and the fact the defect was full thickness a composite graft was deemed most appropriate.  The defect was outline and then transferred to the donor site.  A full thickness graft was then excised from the donor site. The graft was then placed in the primary defect, oriented appropriately and then sutured into place.  The secondary defect was then repaired using a primary closure.
O-L Flap Text: The defect edges were debeveled with a #15 scalpel blade.  Given the location of the defect, shape of the defect and the proximity to free margins an O-L flap was deemed most appropriate.  Using a sterile surgical marker, an appropriate advancement flap was drawn incorporating the defect and placing the expected incisions within the relaxed skin tension lines where possible.    The area thus outlined was incised deep to adipose tissue with a #15 scalpel blade.  The skin margins were undermined to an appropriate distance in all directions utilizing iris scissors.
Date Of Previous Biopsy (Optional): 6/16/22
Debridement Text: The wound edges were debrided prior to proceeding with the closure to facilitate wound healing.
W Plasty Text: The lesion was extirpated to the level of the fat with a #15 scalpel blade.  Given the location of the defect, shape of the defect and the proximity to free margins a W-plasty was deemed most appropriate for repair.  Using a sterile surgical marker, the appropriate transposition arms of the W-plasty were drawn incorporating the defect and placing the expected incisions within the relaxed skin tension lines where possible.    The area thus outlined was incised deep to adipose tissue with a #15 scalpel blade.  The skin margins were undermined to an appropriate distance in all directions utilizing iris scissors.  The opposing transposition arms were then transposed into place in opposite direction and anchored with interrupted buried subcutaneous sutures.
Ear Wedge Repair Text: A wedge excision was completed by carrying down an excision through the full thickness of the ear and cartilage with an inward facing Burow's triangle. The wound was then closed in a layered fashion.
Z Plasty Text: The lesion was extirpated to the level of the fat with a #15 scalpel blade.  Given the location of the defect, shape of the defect and the proximity to free margins a Z-plasty was deemed most appropriate for repair.  Using a sterile surgical marker, the appropriate transposition arms of the Z-plasty were drawn incorporating the defect and placing the expected incisions within the relaxed skin tension lines where possible.    The area thus outlined was incised deep to adipose tissue with a #15 scalpel blade.  The skin margins were undermined to an appropriate distance in all directions utilizing iris scissors.  The opposing transposition arms were then transposed into place in opposite direction and anchored with interrupted buried subcutaneous sutures.
Graft Donor Site Dermal Sutures (Optional): 5-0 Polysorb
Complex Repair And Flap Additional Text (Will Appearing After The Standard Complex Repair Text): The complex repair was not sufficient to completely close the primary defect. The remaining additional defect was repaired with the flap mentioned below.
Closure 2 Information: This tab is for additional flaps and grafts, including complex repair and grafts and complex repair and flaps. You can also specify a different location for the additional defect, if the location is the same you do not need to select a new one. We will insert the automated text for the repair you select below just as we do for solitary flaps and grafts. Please note that at this time if you select a location with a different insurance zone you will need to override the ICD10 and CPT if appropriate.
Hatchet Flap Text: The defect edges were debeveled with a #15 scalpel blade.  Given the location of the defect, shape of the defect and the proximity to free margins a hatchet flap was deemed most appropriate.  Using a sterile surgical marker, an appropriate hatchet flap was drawn incorporating the defect and placing the expected incisions within the relaxed skin tension lines where possible.    The area thus outlined was incised deep to adipose tissue with a #15 scalpel blade.  The skin margins were undermined to an appropriate distance in all directions utilizing iris scissors.
Vermilion Border Text: The closure involved the vermilion border.
Burow's Graft Text: The defect edges were debeveled with a #15 scalpel blade.  Given the location of the defect, shape of the defect, the proximity to free margins and the presence of a standing cone deformity a Burow's skin graft was deemed most appropriate. The standing cone was removed and this tissue was then trimmed to the shape of the primary defect. The adipose tissue was also removed until only dermis and epidermis were left.  The skin margins of the secondary defect were undermined to an appropriate distance in all directions utilizing iris scissors.  The secondary defect was closed with interrupted buried subcutaneous sutures.  The skin edges were then re-apposed with running  sutures.  The skin graft was then placed in the primary defect and oriented appropriately.
Distance Of Undermining In Cm (Required): 2.5
Island Pedicle Flap With Canthal Suspension Text: The defect edges were debeveled with a #15 scalpel blade.  Given the location of the defect, shape of the defect and the proximity to free margins an island pedicle advancement flap was deemed most appropriate.  Using a sterile surgical marker, an appropriate advancement flap was drawn incorporating the defect, outlining the appropriate donor tissue and placing the expected incisions within the relaxed skin tension lines where possible. The area thus outlined was incised deep to adipose tissue with a #15 scalpel blade.  The skin margins were undermined to an appropriate distance in all directions around the primary defect and laterally outward around the island pedicle utilizing iris scissors.  There was minimal undermining beneath the pedicle flap. A suspension suture was placed in the canthal tendon to prevent tension and prevent ectropion.
Suture Removal: 10 days
Mart-1 - Positive Histology Text: MART-1 staining demonstrates areas of higher density and clustering of melanocytes with Pagetoid spread upwards within the epidermis. The surgical margins are positive for tumor cells.
Depth Of Tumor Invasion (For Histology): dermis
Epidermal Closure: running and interrupted
Consent (Nose)/Introductory Paragraph: The rationale for Mohs was explained to the patient and consent was obtained. The risks, benefits and alternatives to therapy were discussed in detail. Specifically, the risks of nasal deformity, changes in the flow of air through the nose, infection, scarring, bleeding, prolonged wound healing, incomplete removal, allergy to anesthesia, nerve injury and recurrence were addressed. Prior to the procedure, the treatment site was clearly identified and confirmed by the patient. All components of Universal Protocol/PAUSE Rule completed.
Tumor Depth: Less than 6mm from granular layer and no invasion beyond the subcutaneous fat
Repair Hemostasis (Optional): Pinpoint electrocautery
S Plasty Text: Given the location and shape of the defect, and the orientation of relaxed skin tension lines, an S-plasty was deemed most appropriate for repair.  Using a sterile surgical marker, the appropriate outline of the S-plasty was drawn, incorporating the defect and placing the expected incisions within the relaxed skin tension lines where possible.  The area thus outlined was incised deep to adipose tissue with a #15 scalpel blade.  The skin margins were undermined to an appropriate distance in all directions utilizing iris scissors. The skin flaps were advanced over the defect.  The opposing margins were then approximated with interrupted buried subcutaneous sutures.
Mustarde Flap Text: The defect edges were debeveled with a #15 scalpel blade.  Given the size, depth and location of the defect and the proximity to free margins a Mustarde flap was deemed most appropriate.  Using a sterile surgical marker, an appropriate flap was drawn incorporating the defect. The area thus outlined was incised with a #15 scalpel blade.  The skin margins were undermined to an appropriate distance in all directions utilizing iris scissors.
Home Suture Removal Text: Patient was provided instructions on removing sutures and will remove their sutures at home.  If they have any questions or difficulties they will call the office.
Undermining Location (Optional): in the superficial subcutaneous fat
Alar Island Pedicle Flap Text: The defect edges were debeveled with a #15 scalpel blade.  Given the location of the defect, shape of the defect and the proximity to the alar rim an island pedicle advancement flap was deemed most appropriate.  Using a sterile surgical marker, an appropriate advancement flap was drawn incorporating the defect, outlining the appropriate donor tissue and placing the expected incisions within the nasal ala running parallel to the alar rim. The area thus outlined was incised with a #15 scalpel blade.  The skin margins were undermined minimally to an appropriate distance in all directions around the primary defect and laterally outward around the island pedicle utilizing iris scissors.  There was minimal undermining beneath the pedicle flap.
Brow Lift Text: A midfrontal incision was made medially to the defect to allow access to the tissues just superior to the left eyebrow. Following careful dissection inferiorly in a supraperiosteal plane to the level of the left eyebrow, several 3-0 monocryl sutures were used to resuspend the eyebrow orbicularis oculi muscular unit to the superior frontal bone periosteum. This resulted in an appropriate reapproximation of static eyebrow symmetry and correction of the left brow ptosis.
Ftsg Text: The defect edges were debeveled with a #15 scalpel blade.  Given the location of the defect, shape of the defect and the proximity to free margins a full thickness skin graft was deemed most appropriate.  Using a sterile surgical marker, the primary defect shape was transferred to the donor site. The area thus outlined was incised deep to adipose tissue with a #15 scalpel blade.  The harvested graft was then trimmed of adipose tissue until only dermis and epidermis was left.  The skin margins of the secondary defect were undermined to an appropriate distance in all directions utilizing iris scissors.  The secondary defect was closed with interrupted buried subcutaneous sutures.  The skin edges were then re-apposed with running  sutures.  The skin graft was then placed in the primary defect and oriented appropriately.
Burow's Advancement Flap Text: The defect edges were debeveled with a #15 scalpel blade.  Given the location of the defect and the proximity to free margins a Burow's advancement flap was deemed most appropriate.  Using a sterile surgical marker, the appropriate advancement flap was drawn incorporating the defect and placing the expected incisions within the relaxed skin tension lines where possible.    The area thus outlined was incised deep to adipose tissue with a #15 scalpel blade.  The skin margins were undermined to an appropriate distance in all directions utilizing iris scissors.
Trilobed Flap Text: The defect edges were debeveled with a #15 scalpel blade.  Given the location of the defect and the proximity to free margins a trilobed flap was deemed most appropriate.  Using a sterile surgical marker, an appropriate trilobed flap drawn around the defect.    The area thus outlined was incised deep to adipose tissue with a #15 scalpel blade.  The skin margins were undermined to an appropriate distance in all directions utilizing iris scissors.
Epidermal Autograft Text: The defect edges were debeveled with a #15 scalpel blade.  Given the location of the defect, shape of the defect and the proximity to free margins an epidermal autograft was deemed most appropriate.  Using a sterile surgical marker, the primary defect shape was transferred to the donor site. The epidermal graft was then harvested.  The skin graft was then placed in the primary defect and oriented appropriately.
Simple / Intermediate / Complex Repair - Final Wound Length In Cm: 6.2
Posterior Auricular Interpolation Flap Text: A decision was made to reconstruct the defect utilizing an interpolation axial flap and a staged reconstruction.  A telfa template was made of the defect.  This telfa template was then used to outline the posterior auricular interpolation flap.  The donor area for the pedicle flap was then injected with anesthesia.  The flap was excised through the skin and subcutaneous tissue down to the layer of the underlying musculature.  The pedicle flap was carefully excised within this deep plane to maintain its blood supply.  The edges of the donor site were undermined.   The donor site was closed in a primary fashion.  The pedicle was then rotated into position and sutured.  Once the tube was sutured into place, adequate blood supply was confirmed with blanching and refill.  The pedicle was then wrapped with xeroform gauze and dressed appropriately with a telfa and gauze bandage to ensure continued blood supply and protect the attached pedicle.
Xenograft Text: The defect edges were debeveled with a #15 scalpel blade.  Given the location of the defect, shape of the defect and the proximity to free margins a xenograft was deemed most appropriate.  The graft was then trimmed to fit the size of the defect.  The graft was then placed in the primary defect and oriented appropriately.
Pain Refusal Text: I offered to prescribe pain medication but the patient refused to take this medication.
Zygomaticofacial Flap Text: Given the location of the defect, shape of the defect and the proximity to free margins a zygomaticofacial flap was deemed most appropriate for repair.  Using a sterile surgical marker, the appropriate flap was drawn incorporating the defect and placing the expected incisions within the relaxed skin tension lines where possible. The area thus outlined was incised deep to adipose tissue with a #15 scalpel blade with preservation of a vascular pedicle.  The skin margins were undermined to an appropriate distance in all directions utilizing iris scissors.  The flap was then placed into the defect and anchored with interrupted buried subcutaneous sutures.
Helical Rim Advancement Flap Text: The defect edges were debeveled with a #15 blade scalpel.  Given the location of the defect and the proximity to free margins (helical rim) a double helical rim advancement flap was deemed most appropriate.  Using a sterile surgical marker, the appropriate advancement flaps were drawn incorporating the defect and placing the expected incisions between the helical rim and antihelix where possible.  The area thus outlined was incised through and through with a #15 scalpel blade.  With a skin hook and iris scissors, the flaps were gently and sharply undermined and freed up.
No Residual Tumor Seen Histology Text: There were no malignant cells seen in the sections examined.
Partial Purse String (Simple) Text: Given the location of the defect and the characteristics of the surrounding skin a simple purse string closure was deemed most appropriate.  Undermining was performed circumfirentially around the surgical defect.  A purse string suture was then placed and tightened. Wound tension only allowed a partial closure of the circular defect.
Double O-Z Plasty Text: The defect edges were debeveled with a #15 scalpel blade.  Given the location of the defect, shape of the defect and the proximity to free margins a Double O-Z plasty (double transposition flap) was deemed most appropriate.  Using a sterile surgical marker, the appropriate transposition flaps were drawn incorporating the defect and placing the expected incisions within the relaxed skin tension lines where possible. The area thus outlined was incised deep to adipose tissue with a #15 scalpel blade.  The skin margins were undermined to an appropriate distance in all directions utilizing iris scissors.  Hemostasis was achieved with electrocautery.  The flaps were then transposed into place, one clockwise and the other counterclockwise, and anchored with interrupted buried subcutaneous sutures.
Island Pedicle Flap Text: The defect edges were debeveled with a #15 scalpel blade.  Given the location of the defect, shape of the defect and the proximity to free margins an island pedicle advancement flap was deemed most appropriate.  Using a sterile surgical marker, an appropriate advancement flap was drawn incorporating the defect, outlining the appropriate donor tissue and placing the expected incisions within the relaxed skin tension lines where possible.    The area thus outlined was incised deep to adipose tissue with a #15 scalpel blade.  The skin margins were undermined to an appropriate distance in all directions around the primary defect and laterally outward around the island pedicle utilizing iris scissors.  There was minimal undermining beneath the pedicle flap.
Detail Level: Detailed
Mohs Rapid Report Verbiage: The area of clinically evident tumor was marked with skin marking ink and appropriately hatched.  The initial incision was made following the Mohs approach through the skin.  The specimen was taken to the lab, divided into the necessary number of pieces, chromacoded and processed according to the Mohs protocol.  This was repeated in successive stages until a tumor free defect was achieved.
Purse String (Simple) Text: Given the location of the defect and the characteristics of the surrounding skin a purse string closure was deemed most appropriate.  Undermining was performed circumfirentially around the surgical defect.  A purse string suture was then placed and tightened.
Consent (Ear)/Introductory Paragraph: The rationale for Mohs was explained to the patient and consent was obtained. The risks, benefits and alternatives to therapy were discussed in detail. Specifically, the risks of ear deformity, infection, scarring, bleeding, prolonged wound healing, incomplete removal, allergy to anesthesia, nerve injury and recurrence were addressed. Prior to the procedure, the treatment site was clearly identified and confirmed by the patient. All components of Universal Protocol/PAUSE Rule completed.
Mastoid Interpolation Flap Text: A decision was made to reconstruct the defect utilizing an interpolation axial flap and a staged reconstruction.  A telfa template was made of the defect.  This telfa template was then used to outline the mastoid interpolation flap.  The donor area for the pedicle flap was then injected with anesthesia.  The flap was excised through the skin and subcutaneous tissue down to the layer of the underlying musculature.  The pedicle flap was carefully excised within this deep plane to maintain its blood supply.  The edges of the donor site were undermined.   The donor site was closed in a primary fashion.  The pedicle was then rotated into position and sutured.  Once the tube was sutured into place, adequate blood supply was confirmed with blanching and refill.  The pedicle was then wrapped with xeroform gauze and dressed appropriately with a telfa and gauze bandage to ensure continued blood supply and protect the attached pedicle.
Advancement Flap (Double) Text: The defect edges were debeveled with a #15 scalpel blade.  Given the location of the defect and the proximity to free margins a double advancement flap was deemed most appropriate.  Using a sterile surgical marker, the appropriate advancement flaps were drawn incorporating the defect and placing the expected incisions within the relaxed skin tension lines where possible.    The area thus outlined was incised deep to adipose tissue with a #15 scalpel blade.  The skin margins were undermined to an appropriate distance in all directions utilizing iris scissors.
Staging Info: By selecting yes to the question above you will include information on AJCC 8 tumor staging in your Mohs note. Information on tumor staging will be automatically added for SCCs on the head and neck. AJCC 8 includes tumor size, tumor depth, perineural involvement and bone invasion.
Complex Repair And Graft Additional Text (Will Appearing After The Standard Complex Repair Text): The complex repair was not sufficient to completely close the primary defect. The remaining additional defect was repaired with the graft mentioned below.
Hemostasis: Electrocautery
Melolabial Transposition Flap Text: The defect edges were debeveled with a #15 scalpel blade.  Given the location of the defect and the proximity to free margins a melolabial flap was deemed most appropriate.  Using a sterile surgical marker, an appropriate melolabial transposition flap was drawn incorporating the defect.    The area thus outlined was incised deep to adipose tissue with a #15 scalpel blade.  The skin margins were undermined to an appropriate distance in all directions utilizing iris scissors.
Suturegard Intro: Intraoperative tissue expansion was performed, utilizing the SUTUREGARD device, in order to reduce wound tension.
Post-Care Instructions: I reviewed with the patient in detail post-care instructions. Patient is not to engage in any heavy lifting, exercise, or swimming for the next 14 days. Should the patient develop any fevers, chills, bleeding, severe pain patient will contact the office immediately.
Modified Advancement Flap Text: The defect edges were debeveled with a #15 scalpel blade.  Given the location of the defect, shape of the defect and the proximity to free margins a modified advancement flap was deemed most appropriate.  Using a sterile surgical marker, an appropriate advancement flap was drawn incorporating the defect and placing the expected incisions within the relaxed skin tension lines where possible.    The area thus outlined was incised deep to adipose tissue with a #15 scalpel blade.  The skin margins were undermined to an appropriate distance in all directions utilizing iris scissors.
Advancement-Rotation Flap Text: The defect edges were debeveled with a #15 scalpel blade.  Given the location of the defect, shape of the defect and the proximity to free margins an advancement-rotation flap was deemed most appropriate.  Using a sterile surgical marker, an appropriate flap was drawn incorporating the defect and placing the expected incisions within the relaxed skin tension lines where possible. The area thus outlined was incised deep to adipose tissue with a #15 scalpel blade.  The skin margins were undermined to an appropriate distance in all directions utilizing iris scissors.
O-T Plasty Text: The defect edges were debeveled with a #15 scalpel blade.  Given the location of the defect, shape of the defect and the proximity to free margins an O-T plasty was deemed most appropriate.  Using a sterile surgical marker, an appropriate O-T plasty was drawn incorporating the defect and placing the expected incisions within the relaxed skin tension lines where possible.    The area thus outlined was incised deep to adipose tissue with a #15 scalpel blade.  The skin margins were undermined to an appropriate distance in all directions utilizing iris scissors.
Melolabial Interpolation Flap Text: A decision was made to reconstruct the defect utilizing an interpolation axial flap and a staged reconstruction.  A telfa template was made of the defect.  This telfa template was then used to outline the melolabial interpolation flap.  The donor area for the pedicle flap was then injected with anesthesia.  The flap was excised through the skin and subcutaneous tissue down to the layer of the underlying musculature.  The pedicle flap was carefully excised within this deep plane to maintain its blood supply.  The edges of the donor site were undermined.   The donor site was closed in a primary fashion.  The pedicle was then rotated into position and sutured.  Once the tube was sutured into place, adequate blood supply was confirmed with blanching and refill.  The pedicle was then wrapped with xeroform gauze and dressed appropriately with a telfa and gauze bandage to ensure continued blood supply and protect the attached pedicle.
Mercedes Flap Text: The defect edges were debeveled with a #15 scalpel blade.  Given the location of the defect, shape of the defect and the proximity to free margins a Mercedes flap was deemed most appropriate.  Using a sterile surgical marker, an appropriate advancement flap was drawn incorporating the defect and placing the expected incisions within the relaxed skin tension lines where possible. The area thus outlined was incised deep to adipose tissue with a #15 scalpel blade.  The skin margins were undermined to an appropriate distance in all directions utilizing iris scissors.
Retention Suture Bite Size: 1 mm
Same Histology In Subsequent Stages Text: The pattern and morphology of the tumor is as described in the first stage.
Bilobed Transposition Flap Text: The defect edges were debeveled with a #15 scalpel blade.  Given the location of the defect and the proximity to free margins a bilobed transposition flap was deemed most appropriate.  Using a sterile surgical marker, an appropriate bilobe flap drawn around the defect.    The area thus outlined was incised deep to adipose tissue with a #15 scalpel blade.  The skin margins were undermined to an appropriate distance in all directions utilizing iris scissors.
V-Y Flap Text: The defect edges were debeveled with a #15 scalpel blade.  Given the location of the defect, shape of the defect and the proximity to free margins a V-Y flap was deemed most appropriate.  Using a sterile surgical marker, an appropriate advancement flap was drawn incorporating the defect and placing the expected incisions within the relaxed skin tension lines where possible.    The area thus outlined was incised deep to adipose tissue with a #15 scalpel blade.  The skin margins were undermined to an appropriate distance in all directions utilizing iris scissors.
Consent (Scalp)/Introductory Paragraph: The rationale for Mohs was explained to the patient and consent was obtained. The risks, benefits and alternatives to therapy were discussed in detail. Specifically, the risks of changes in hair growth pattern secondary to repair, infection, scarring, bleeding, prolonged wound healing, incomplete removal, allergy to anesthesia, nerve injury and recurrence were addressed. Prior to the procedure, the treatment site was clearly identified and confirmed by the patient. All components of Universal Protocol/PAUSE Rule completed.
Abbe Flap (Upper To Lower Lip) Text: The defect of the lower lip was assessed and measured.  Given the location and size of the defect, an Abbe flap was deemed most appropriate.  Using a sterile surgical marker, an appropriate Abbe flap was measured and drawn on the upper lip. Local anesthesia was then infiltrated.  A scalpel was then used to incise the upper lip through and through the skin, vermilion, muscle and mucosa, leaving the flap pedicled on the opposite side.  The flap was then rotated and transferred to the lower lip defect.  The flap was then sutured into place with a three layer technique, closing the orbicularis oris muscle layer with subcutaneous buried sutures, followed by a mucosal layer and an epidermal layer.
Abbe Flap (Lower To Upper Lip) Text: The defect of the upper lip was assessed and measured.  Given the location and size of the defect, an Abbe flap was deemed most appropriate.  Using a sterile surgical marker, an appropriate Abbe flap was measured and drawn on the lower lip. Local anesthesia was then infiltrated. A scalpel was then used to incise the upper lip through and through the skin, vermilion, muscle and mucosa, leaving the flap pedicled on the opposite side.  The flap was then rotated and transferred to the lower lip defect.  The flap was then sutured into place with a three layer technique, closing the orbicularis oris muscle layer with subcutaneous buried sutures, followed by a mucosal layer and an epidermal layer.
Estlander Flap (Upper To Lower Lip) Text: The defect of the lower lip was assessed and measured.  Given the location and size of the defect, an Estlander flap was deemed most appropriate.  Using a sterile surgical marker, an appropriate Estlander flap was measured and drawn on the upper lip. Local anesthesia was then infiltrated. A scalpel was then used to incise the lateral aspect of the flap, through skin, muscle and mucosa, leaving the flap pedicled medially.  The flap was then rotated and positioned to fill the lower lip defect.  The flap was then sutured into place with a three layer technique, closing the orbicularis oris muscle layer with subcutaneous buried sutures, followed by a mucosal layer and an epidermal layer.

## 2022-10-24 ENCOUNTER — APPOINTMENT (RX ONLY)
Dept: URBAN - METROPOLITAN AREA CLINIC 36 | Facility: CLINIC | Age: 77
Setting detail: DERMATOLOGY
End: 2022-10-24

## 2022-10-24 DIAGNOSIS — Z48.02 ENCOUNTER FOR REMOVAL OF SUTURES: ICD-10-CM

## 2022-10-24 PROCEDURE — ? SUTURE REMOVAL (GLOBAL PERIOD)

## 2022-10-24 ASSESSMENT — LOCATION DETAILED DESCRIPTION DERM: LOCATION DETAILED: LEFT CENTRAL MALAR CHEEK

## 2022-10-24 ASSESSMENT — LOCATION SIMPLE DESCRIPTION DERM: LOCATION SIMPLE: LEFT CHEEK

## 2022-10-24 ASSESSMENT — LOCATION ZONE DERM: LOCATION ZONE: FACE

## 2022-10-24 NOTE — PROCEDURE: SUTURE REMOVAL (GLOBAL PERIOD)
Detail Level: Detailed
Body Location Override (Optional - Billing Will Still Be Based On Selected Body Map Location If Applicable): left superior central buccal cheek.
Add 69844 Cpt? (Important Note: In 2017 The Use Of 10387 Is Being Tracked By Cms To Determine Future Global Period Reimbursement For Global Periods): no

## 2022-10-24 NOTE — PROCEDURE: MIPS QUALITY
Detail Level: Detailed
Quality 111:Pneumonia Vaccination Status For Older Adults: Pneumococcal vaccine (PPSV23) administered on or after patient’s 60th birthday and before the end of the measurement period
Quality 402: Tobacco Use And Help With Quitting Among Adolescents: Patient screened for tobacco and is an ex-smoker
Quality 130: Documentation Of Current Medications In The Medical Record: Current Medications Documented
Quality 431: Preventive Care And Screening: Unhealthy Alcohol Use - Screening: Patient not identified as an unhealthy alcohol user when screened for unhealthy alcohol use using a systematic screening method

## 2022-12-15 ENCOUNTER — APPOINTMENT (RX ONLY)
Dept: URBAN - METROPOLITAN AREA CLINIC 36 | Facility: CLINIC | Age: 77
Setting detail: DERMATOLOGY
End: 2022-12-15

## 2022-12-15 DIAGNOSIS — L57.0 ACTINIC KERATOSIS: ICD-10-CM

## 2022-12-15 PROBLEM — D04.5 CARCINOMA IN SITU OF SKIN OF TRUNK: Status: ACTIVE | Noted: 2022-12-15

## 2022-12-15 PROCEDURE — ? LIQUID NITROGEN

## 2022-12-15 PROCEDURE — ? CURETTAGE AND DESTRUCTION

## 2022-12-15 PROCEDURE — 17262 DSTRJ MAL LES T/A/L 1.1-2.0: CPT

## 2022-12-15 PROCEDURE — ? DIAGNOSIS COMMENT

## 2022-12-15 PROCEDURE — 17000 DESTRUCT PREMALG LESION: CPT | Mod: 59

## 2022-12-15 ASSESSMENT — LOCATION ZONE DERM: LOCATION ZONE: ARM

## 2022-12-15 ASSESSMENT — LOCATION SIMPLE DESCRIPTION DERM: LOCATION SIMPLE: RIGHT FOREARM

## 2022-12-15 ASSESSMENT — LOCATION DETAILED DESCRIPTION DERM: LOCATION DETAILED: RIGHT DISTAL RADIAL DORSAL FOREARM

## 2022-12-15 NOTE — PROCEDURE: CURETTAGE AND DESTRUCTION
Detail Level: Detailed
Biopsy Photograph Reviewed: Yes
Accession # (Optional): [id number]
Number Of Curettages: 3
Size Of Lesion In Cm: 1
Size Of Lesion After Curettage: 1.5
Add Intralesional Injection: No
Concentration (Mg/Ml Or Millions Of Plaque Forming Units/Cc): 0.01
Anesthesia Type: 1% lidocaine with epinephrine
Cautery Type: electrodesiccation
What Was Performed First?: Curettage
Final Size Statement: The size of the lesion after curettage was
Additional Information: (Optional): The wound was cleaned, and a pressure dressing was applied.  The patient received detailed post-op instructions.
Consent was obtained from the patient. The risks, benefits and alternatives to therapy were discussed in detail. Specifically, the risks of infection, scarring, bleeding, prolonged wound healing, nerve injury, incomplete removal, allergy to anesthesia and recurrence were addressed. Alternatives to ED&C, such as: surgical removal and XRT were also discussed.  Prior to the procedure, the treatment site was clearly identified and confirmed by the patient. All components of Universal Protocol/PAUSE Rule completed.
Post-Care Instructions: I reviewed with the patient in detail post-care instructions. Patient is to keep the area dry for 48 hours, and not to engage in any swimming until the area is healed. Should the patient develop any fevers, chills, bleeding, severe pain patient will contact the office immediately.
Bill As A Line Item Or As Units: Line Item

## 2023-01-18 ENCOUNTER — APPOINTMENT (OUTPATIENT)
Dept: RADIOLOGY | Facility: MEDICAL CENTER | Age: 78
DRG: 391 | End: 2023-01-18
Payer: MEDICARE

## 2023-01-18 ENCOUNTER — APPOINTMENT (OUTPATIENT)
Dept: RADIOLOGY | Facility: MEDICAL CENTER | Age: 78
DRG: 391 | End: 2023-01-18
Attending: EMERGENCY MEDICINE
Payer: MEDICARE

## 2023-01-18 ENCOUNTER — HOSPITAL ENCOUNTER (INPATIENT)
Facility: MEDICAL CENTER | Age: 78
LOS: 7 days | DRG: 391 | End: 2023-01-25
Attending: EMERGENCY MEDICINE | Admitting: HOSPITALIST
Payer: MEDICARE

## 2023-01-18 DIAGNOSIS — K57.92 DIVERTICULITIS: ICD-10-CM

## 2023-01-18 DIAGNOSIS — R10.32 LEFT LOWER QUADRANT ABDOMINAL PAIN: ICD-10-CM

## 2023-01-18 DIAGNOSIS — R11.2 VOMITING WITH NAUSEA, NOT INTRACTABLE: ICD-10-CM

## 2023-01-18 PROBLEM — K57.90 DIVERTICULOSIS: Status: ACTIVE | Noted: 2023-01-18

## 2023-01-18 PROBLEM — K21.9 GASTROESOPHAGEAL REFLUX DISEASE: Status: ACTIVE | Noted: 2023-01-18

## 2023-01-18 PROBLEM — R11.0 NAUSEA: Status: ACTIVE | Noted: 2023-01-18

## 2023-01-18 PROBLEM — E03.9 HYPOTHYROIDISM: Status: ACTIVE | Noted: 2023-01-18

## 2023-01-18 PROBLEM — H40.89 OTHER SPECIFIED GLAUCOMA: Status: ACTIVE | Noted: 2023-01-18

## 2023-01-18 LAB
ALBUMIN SERPL BCP-MCNC: 3.8 G/DL (ref 3.2–4.9)
ALBUMIN/GLOB SERPL: 1.4 G/DL
ALP SERPL-CCNC: 79 U/L (ref 30–99)
ALT SERPL-CCNC: 5 U/L (ref 2–50)
ANION GAP SERPL CALC-SCNC: 11 MMOL/L (ref 7–16)
APPEARANCE UR: CLEAR
AST SERPL-CCNC: 10 U/L (ref 12–45)
BASOPHILS # BLD AUTO: 1 % (ref 0–1.8)
BASOPHILS # BLD: 0.06 K/UL (ref 0–0.12)
BILIRUB SERPL-MCNC: 0.4 MG/DL (ref 0.1–1.5)
BILIRUB UR QL STRIP.AUTO: NEGATIVE
BUN SERPL-MCNC: 23 MG/DL (ref 8–22)
CALCIUM ALBUM COR SERPL-MCNC: 9.5 MG/DL (ref 8.5–10.5)
CALCIUM SERPL-MCNC: 9.3 MG/DL (ref 8.5–10.5)
CHLORIDE SERPL-SCNC: 108 MMOL/L (ref 96–112)
CO2 SERPL-SCNC: 22 MMOL/L (ref 20–33)
COLOR UR: YELLOW
CREAT SERPL-MCNC: 0.48 MG/DL (ref 0.5–1.4)
EOSINOPHIL # BLD AUTO: 0.26 K/UL (ref 0–0.51)
EOSINOPHIL NFR BLD: 4.2 % (ref 0–6.9)
ERYTHROCYTE [DISTWIDTH] IN BLOOD BY AUTOMATED COUNT: 44.1 FL (ref 35.9–50)
GFR SERPLBLD CREATININE-BSD FMLA CKD-EPI: 96 ML/MIN/1.73 M 2
GLOBULIN SER CALC-MCNC: 2.8 G/DL (ref 1.9–3.5)
GLUCOSE SERPL-MCNC: 94 MG/DL (ref 65–99)
GLUCOSE UR STRIP.AUTO-MCNC: NEGATIVE MG/DL
HCT VFR BLD AUTO: 37.7 % (ref 37–47)
HGB BLD-MCNC: 12.3 G/DL (ref 12–16)
IMM GRANULOCYTES # BLD AUTO: 0.02 K/UL (ref 0–0.11)
IMM GRANULOCYTES NFR BLD AUTO: 0.3 % (ref 0–0.9)
KETONES UR STRIP.AUTO-MCNC: ABNORMAL MG/DL
LACTATE SERPL-SCNC: 1 MMOL/L (ref 0.5–2)
LEUKOCYTE ESTERASE UR QL STRIP.AUTO: NEGATIVE
LIPASE SERPL-CCNC: 32 U/L (ref 11–82)
LYMPHOCYTES # BLD AUTO: 1.39 K/UL (ref 1–4.8)
LYMPHOCYTES NFR BLD: 22.6 % (ref 22–41)
MCH RBC QN AUTO: 30.4 PG (ref 27–33)
MCHC RBC AUTO-ENTMCNC: 32.6 G/DL (ref 33.6–35)
MCV RBC AUTO: 93.3 FL (ref 81.4–97.8)
MICRO URNS: ABNORMAL
MONOCYTES # BLD AUTO: 0.51 K/UL (ref 0–0.85)
MONOCYTES NFR BLD AUTO: 8.3 % (ref 0–13.4)
NEUTROPHILS # BLD AUTO: 3.91 K/UL (ref 2–7.15)
NEUTROPHILS NFR BLD: 63.6 % (ref 44–72)
NITRITE UR QL STRIP.AUTO: NEGATIVE
NRBC # BLD AUTO: 0 K/UL
NRBC BLD-RTO: 0 /100 WBC
PH UR STRIP.AUTO: 5.5 [PH] (ref 5–8)
PLATELET # BLD AUTO: 238 K/UL (ref 164–446)
PMV BLD AUTO: 8.1 FL (ref 9–12.9)
POTASSIUM SERPL-SCNC: 3.9 MMOL/L (ref 3.6–5.5)
PROT SERPL-MCNC: 6.6 G/DL (ref 6–8.2)
PROT UR QL STRIP: NEGATIVE MG/DL
RBC # BLD AUTO: 4.04 M/UL (ref 4.2–5.4)
RBC UR QL AUTO: NEGATIVE
SODIUM SERPL-SCNC: 141 MMOL/L (ref 135–145)
SP GR UR STRIP.AUTO: 1.04
UROBILINOGEN UR STRIP.AUTO-MCNC: 0.2 MG/DL
WBC # BLD AUTO: 6.2 K/UL (ref 4.8–10.8)

## 2023-01-18 PROCEDURE — 700101 HCHG RX REV CODE 250: Performed by: EMERGENCY MEDICINE

## 2023-01-18 PROCEDURE — 36415 COLL VENOUS BLD VENIPUNCTURE: CPT

## 2023-01-18 PROCEDURE — 96375 TX/PRO/DX INJ NEW DRUG ADDON: CPT

## 2023-01-18 PROCEDURE — 99285 EMERGENCY DEPT VISIT HI MDM: CPT

## 2023-01-18 PROCEDURE — 81003 URINALYSIS AUTO W/O SCOPE: CPT

## 2023-01-18 PROCEDURE — 99223 1ST HOSP IP/OBS HIGH 75: CPT | Mod: AI,GC | Performed by: HOSPITALIST

## 2023-01-18 PROCEDURE — 96365 THER/PROPH/DIAG IV INF INIT: CPT

## 2023-01-18 PROCEDURE — 700105 HCHG RX REV CODE 258

## 2023-01-18 PROCEDURE — 74177 CT ABD & PELVIS W/CONTRAST: CPT

## 2023-01-18 PROCEDURE — 83605 ASSAY OF LACTIC ACID: CPT

## 2023-01-18 PROCEDURE — 770006 HCHG ROOM/CARE - MED/SURG/GYN SEMI*

## 2023-01-18 PROCEDURE — A9270 NON-COVERED ITEM OR SERVICE: HCPCS

## 2023-01-18 PROCEDURE — 83690 ASSAY OF LIPASE: CPT

## 2023-01-18 PROCEDURE — 700111 HCHG RX REV CODE 636 W/ 250 OVERRIDE (IP): Performed by: EMERGENCY MEDICINE

## 2023-01-18 PROCEDURE — 80053 COMPREHEN METABOLIC PANEL: CPT

## 2023-01-18 PROCEDURE — 85025 COMPLETE CBC W/AUTO DIFF WBC: CPT

## 2023-01-18 PROCEDURE — 700117 HCHG RX CONTRAST REV CODE 255: Performed by: EMERGENCY MEDICINE

## 2023-01-18 PROCEDURE — 700102 HCHG RX REV CODE 250 W/ 637 OVERRIDE(OP)

## 2023-01-18 RX ORDER — PROCHLORPERAZINE EDISYLATE 5 MG/ML
10 INJECTION INTRAMUSCULAR; INTRAVENOUS EVERY 6 HOURS PRN
Status: DISCONTINUED | OUTPATIENT
Start: 2023-01-18 | End: 2023-01-20

## 2023-01-18 RX ORDER — LANSOPRAZOLE 30 MG/1
30 CAPSULE, DELAYED RELEASE ORAL DAILY
COMMUNITY

## 2023-01-18 RX ORDER — LISINOPRIL 10 MG/1
10 TABLET ORAL EVERY MORNING
Status: DISCONTINUED | OUTPATIENT
Start: 2023-01-19 | End: 2023-01-22

## 2023-01-18 RX ORDER — ACETAMINOPHEN 325 MG/1
650 TABLET ORAL EVERY 6 HOURS PRN
Status: DISCONTINUED | OUTPATIENT
Start: 2023-01-18 | End: 2023-01-18

## 2023-01-18 RX ORDER — CIPROFLOXACIN 500 MG/1
500 TABLET, FILM COATED ORAL EVERY 12 HOURS
Status: DISCONTINUED | OUTPATIENT
Start: 2023-01-18 | End: 2023-01-25 | Stop reason: HOSPADM

## 2023-01-18 RX ORDER — CARVEDILOL 6.25 MG/1
6.25 TABLET ORAL 2 TIMES DAILY
Status: DISCONTINUED | OUTPATIENT
Start: 2023-01-18 | End: 2023-01-25 | Stop reason: HOSPADM

## 2023-01-18 RX ORDER — LEVOTHYROXINE SODIUM 0.1 MG/1
100 TABLET ORAL
Status: DISCONTINUED | OUTPATIENT
Start: 2023-01-19 | End: 2023-01-25 | Stop reason: HOSPADM

## 2023-01-18 RX ORDER — OMEPRAZOLE 20 MG/1
20 CAPSULE, DELAYED RELEASE ORAL DAILY
Status: DISCONTINUED | OUTPATIENT
Start: 2023-01-18 | End: 2023-01-19

## 2023-01-18 RX ORDER — POLYETHYLENE GLYCOL 3350 17 G/17G
1 POWDER, FOR SOLUTION ORAL
Status: DISCONTINUED | OUTPATIENT
Start: 2023-01-18 | End: 2023-01-20

## 2023-01-18 RX ORDER — ACETAMINOPHEN 500 MG
1000 TABLET ORAL EVERY 8 HOURS PRN
Status: DISCONTINUED | OUTPATIENT
Start: 2023-01-18 | End: 2023-01-25 | Stop reason: HOSPADM

## 2023-01-18 RX ORDER — SODIUM CHLORIDE 9 MG/ML
INJECTION, SOLUTION INTRAVENOUS CONTINUOUS
Status: DISCONTINUED | OUTPATIENT
Start: 2023-01-18 | End: 2023-01-19

## 2023-01-18 RX ORDER — AMOXICILLIN 250 MG
2 CAPSULE ORAL 2 TIMES DAILY
Status: DISCONTINUED | OUTPATIENT
Start: 2023-01-18 | End: 2023-01-20

## 2023-01-18 RX ORDER — CHOLECALCIFEROL (VITAMIN D3) 125 MCG
2000 CAPSULE ORAL EVERY MORNING
COMMUNITY

## 2023-01-18 RX ORDER — ALUMINA, MAGNESIA, AND SIMETHICONE 2400; 2400; 240 MG/30ML; MG/30ML; MG/30ML
10 SUSPENSION ORAL EVERY EVENING
Status: ON HOLD | COMMUNITY
End: 2023-01-25

## 2023-01-18 RX ORDER — METRONIDAZOLE 500 MG/1
500 TABLET ORAL EVERY 8 HOURS
Status: DISCONTINUED | OUTPATIENT
Start: 2023-01-18 | End: 2023-01-25 | Stop reason: HOSPADM

## 2023-01-18 RX ORDER — METRONIDAZOLE 500 MG/100ML
500 INJECTION, SOLUTION INTRAVENOUS EVERY 6 HOURS
Status: COMPLETED | OUTPATIENT
Start: 2023-01-18 | End: 2023-01-18

## 2023-01-18 RX ORDER — FAMOTIDINE 20 MG/1
20 TABLET, FILM COATED ORAL 2 TIMES DAILY
Status: DISCONTINUED | OUTPATIENT
Start: 2023-01-18 | End: 2023-01-19

## 2023-01-18 RX ORDER — CEFTRIAXONE 2 G/1
2000 INJECTION, POWDER, FOR SOLUTION INTRAMUSCULAR; INTRAVENOUS ONCE
Status: COMPLETED | OUTPATIENT
Start: 2023-01-18 | End: 2023-01-18

## 2023-01-18 RX ORDER — ALUMINA, MAGNESIA, AND SIMETHICONE 2400; 2400; 240 MG/30ML; MG/30ML; MG/30ML
10 SUSPENSION ORAL EVERY EVENING
Status: DISCONTINUED | OUTPATIENT
Start: 2023-01-18 | End: 2023-01-25 | Stop reason: HOSPADM

## 2023-01-18 RX ORDER — LATANOPROST 50 UG/ML
1 SOLUTION/ DROPS OPHTHALMIC
Status: DISCONTINUED | OUTPATIENT
Start: 2023-01-18 | End: 2023-01-25 | Stop reason: HOSPADM

## 2023-01-18 RX ORDER — BISACODYL 10 MG
10 SUPPOSITORY, RECTAL RECTAL
Status: DISCONTINUED | OUTPATIENT
Start: 2023-01-18 | End: 2023-01-20

## 2023-01-18 RX ORDER — ATORVASTATIN CALCIUM 20 MG/1
20 TABLET, FILM COATED ORAL EVERY EVENING
Status: DISCONTINUED | OUTPATIENT
Start: 2023-01-18 | End: 2023-01-25 | Stop reason: HOSPADM

## 2023-01-18 RX ADMIN — ATORVASTATIN CALCIUM 20 MG: 20 TABLET, FILM COATED ORAL at 17:07

## 2023-01-18 RX ADMIN — CIPROFLOXACIN 500 MG: 500 TABLET, FILM COATED ORAL at 17:56

## 2023-01-18 RX ADMIN — CEFTRIAXONE SODIUM 2000 MG: 2 INJECTION, POWDER, FOR SOLUTION INTRAMUSCULAR; INTRAVENOUS at 13:17

## 2023-01-18 RX ADMIN — SODIUM CHLORIDE: 9 INJECTION, SOLUTION INTRAVENOUS at 15:48

## 2023-01-18 RX ADMIN — RIVAROXABAN 10 MG: 10 TABLET, FILM COATED ORAL at 17:07

## 2023-01-18 RX ADMIN — IOHEXOL 100 ML: 350 INJECTION, SOLUTION INTRAVENOUS at 11:44

## 2023-01-18 RX ADMIN — METRONIDAZOLE 500 MG: 5 INJECTION, SOLUTION INTRAVENOUS at 13:29

## 2023-01-18 RX ADMIN — OMEPRAZOLE 20 MG: 20 CAPSULE, DELAYED RELEASE ORAL at 17:07

## 2023-01-18 RX ADMIN — LATANOPROST 1 DROP: 50 SOLUTION OPHTHALMIC at 17:08

## 2023-01-18 RX ADMIN — METRONIDAZOLE 500 MG: 500 TABLET ORAL at 17:56

## 2023-01-18 RX ADMIN — CARVEDILOL 6.25 MG: 6.25 TABLET, FILM COATED ORAL at 17:07

## 2023-01-18 RX ADMIN — ALUMINUM HYDROXIDE, MAGNESIUM HYDROXIDE, AND DIMETHICONE 10 ML: 400; 400; 40 SUSPENSION ORAL at 17:07

## 2023-01-18 RX ADMIN — FAMOTIDINE 20 MG: 20 TABLET, FILM COATED ORAL at 17:07

## 2023-01-18 RX ADMIN — METRONIDAZOLE 500 MG: 500 TABLET ORAL at 22:27

## 2023-01-18 RX ADMIN — ACETAMINOPHEN 1000 MG: 500 TABLET ORAL at 17:08

## 2023-01-18 ASSESSMENT — LIFESTYLE VARIABLES
HOW MANY TIMES IN THE PAST YEAR HAVE YOU HAD 5 OR MORE DRINKS IN A DAY: 0
TOTAL SCORE: 0
HAVE PEOPLE ANNOYED YOU BY CRITICIZING YOUR DRINKING: NO
TOTAL SCORE: 0
EVER FELT BAD OR GUILTY ABOUT YOUR DRINKING: NO
CONSUMPTION TOTAL: NEGATIVE
ON A TYPICAL DAY WHEN YOU DRINK ALCOHOL HOW MANY DRINKS DO YOU HAVE: 0
TOTAL SCORE: 0
EVER HAD A DRINK FIRST THING IN THE MORNING TO STEADY YOUR NERVES TO GET RID OF A HANGOVER: NO
ALCOHOL_USE: NO
DOES PATIENT WANT TO STOP DRINKING: NO
HAVE YOU EVER FELT YOU SHOULD CUT DOWN ON YOUR DRINKING: NO
AVERAGE NUMBER OF DAYS PER WEEK YOU HAVE A DRINK CONTAINING ALCOHOL: 0

## 2023-01-18 ASSESSMENT — ENCOUNTER SYMPTOMS
DIZZINESS: 0
BLOOD IN STOOL: 0
HEADACHES: 0
CONSTIPATION: 1
HEARTBURN: 1
FEVER: 0
NAUSEA: 1
COUGH: 0
VOMITING: 0
SORE THROAT: 0
PALPITATIONS: 0
WHEEZING: 0
ABDOMINAL PAIN: 1
WEAKNESS: 0
SHORTNESS OF BREATH: 0
DIARRHEA: 0
WEIGHT LOSS: 1
SPUTUM PRODUCTION: 0
CHILLS: 0
MYALGIAS: 0

## 2023-01-18 ASSESSMENT — COGNITIVE AND FUNCTIONAL STATUS - GENERAL
DAILY ACTIVITIY SCORE: 20
MOVING FROM LYING ON BACK TO SITTING ON SIDE OF FLAT BED: A LITTLE
HELP NEEDED FOR BATHING: A LITTLE
DRESSING REGULAR UPPER BODY CLOTHING: A LITTLE
SUGGESTED CMS G CODE MODIFIER MOBILITY: CK
SUGGESTED CMS G CODE MODIFIER DAILY ACTIVITY: CJ
STANDING UP FROM CHAIR USING ARMS: A LITTLE
TURNING FROM BACK TO SIDE WHILE IN FLAT BAD: A LITTLE
MOBILITY SCORE: 18
WALKING IN HOSPITAL ROOM: A LITTLE
DRESSING REGULAR LOWER BODY CLOTHING: A LITTLE
MOVING TO AND FROM BED TO CHAIR: A LITTLE
TOILETING: A LITTLE
CLIMB 3 TO 5 STEPS WITH RAILING: A LITTLE

## 2023-01-18 ASSESSMENT — PATIENT HEALTH QUESTIONNAIRE - PHQ9
2. FEELING DOWN, DEPRESSED, IRRITABLE, OR HOPELESS: NOT AT ALL
SUM OF ALL RESPONSES TO PHQ9 QUESTIONS 1 AND 2: 0
1. LITTLE INTEREST OR PLEASURE IN DOING THINGS: NOT AT ALL

## 2023-01-18 ASSESSMENT — FIBROSIS 4 INDEX
FIB4 SCORE: 1.48
FIB4 SCORE: 2.36

## 2023-01-18 ASSESSMENT — PAIN DESCRIPTION - PAIN TYPE: TYPE: ACUTE PAIN

## 2023-01-18 NOTE — H&P
[facility] Internal Medicine History & Physical Note    Date of Service  1/18/2023    [facility] Team: [facility] IM White Team   Attending: Jorge L Mancuso M.d.  Senior Resident: Dr. Taylor  Intern:  Dr. Vieyra  Contact Number: [phone]    Primary Care Physician  Baltazar Rojas M.D.    Consultants  None    Code Status  Full Code    Chief Complaint  Chief Complaint   Patient presents with    Abdominal Pain     Pt presents with persistent nausea since June and LLQ pain intermittently without relief from pepcid, zofran and prevacid. Pt states she is nauseous before and after eating. Pt has had CT's done in the past. Pt states she needs an endoscopy and has a GI appointment in three weeks but does not think she can wait that long.        History of Presenting Illness (HPI):   Maryam Gomez is a 77 y.o. female who presented 1/18/2023 with cc LLQ pain and nausea.    She said the LLQ pain and nausea initially started June 2022 at which time she had a CT A/P, hida scan and H pylori test all of which were negative. She established with a GI physician and in November had an appointment where they decided to not pursue having an EGD or Colonoscopy since her sx were somewhat improving at that time. However, in the middle of December her sx returned and she has had constant nausea (without vomiting), lightheadedness, and LLQ pain. She described the pain as sharp (not tearing, dull, or crampy). Her pain is alleviated with fasting and exacerbated with eating (immediately after eating). Her pain has not been alleviated with Tylenol. She said it also sometimes comes on randomly. Her nausea is associated with the LLQ pain and her nausea has only been alleviated with Mylanta. She has tried zofran without relief and takes famotidine and lansoprazole without any sx relief. She also noted Hx of BM every day but also constipation with straining and weight loss of about 15-20 lbs since onset of her sx. Most recent colonoscopy was done in 2018 and  noted that colon showed erythema, edema, and superficial ulcers in sigmoid colon with mild sigmoid diverticulosis.     On presenting to the ED labs were fairly unremarkable. However, CT A/p was notable for Sigmoid diverticulosis with hypodensities in the wall of the sigmoid colon with colonic stranding. Could represent diverticulitis vs mass. While in the ED she received one dose of ceftriaxone and metronidazole. She was then admitted for further treatment and workup.     Reviewed code status with patient, she would like to remain full code at this time.     I discussed the plan of care with  patient and internal medicine team .    Review of Systems  Review of Systems   Constitutional:  Positive for malaise/fatigue and weight loss. Negative for chills and fever.   HENT:  Negative for congestion and sore throat.    Respiratory:  Negative for cough, sputum production, shortness of breath and wheezing.    Cardiovascular:  Negative for chest pain, palpitations and leg swelling.        Ankle swelling   Gastrointestinal:  Positive for abdominal pain, constipation, heartburn and nausea. Negative for blood in stool, diarrhea, melena and vomiting.   Genitourinary:  Negative for dysuria, frequency and urgency.   Musculoskeletal:  Positive for joint pain. Negative for myalgias.   Neurological:  Negative for dizziness, weakness and headaches.        Lightheadedness      Past Medical History   has a past medical history of Anxiety, Arthritis, Cramp of limb (3/16/2012), Diastolic dysfunction (3/16/2012), DVT (deep venous thrombosis) ([facility]), Edema (3/16/2012), GERD (gastroesophageal reflux disease), Glaucoma, Heart burn, Hiatus hernia syndrome, Hypercholesteremia, Hyperlipidemia (3/16/2012), Hypertension, Hypothyroid, Indigestion, Osteopenia, Palpitations (3/16/2012), and Polio.    Surgical History   has a past surgical history that includes hysterectomy laparoscopy (1984); other (1990); tonsillectomy (1965); hip arthroplasty total  (11/14/2012); foot surgery (1993); shoulder decompression arthroscopic (Right, 5/18/2016); shoulder arthroscopy w/ rotator cuff repair (Right, 5/18/2016); colonoscopy (N/A, 8/4/2018); and closed reduction (Left, 5/16/2021).     Family History  family history is not on file.   Family history reviewed with patient.     Social History  Tobacco: Patient smoked 1 pk/day from [id number] (27 pk/yr hx)   Alcohol: used to drink one glass of wine daily but hasn't for a while due to abd pain.   Recreational drugs (illegal or prescription): marijuana in the 60s only.   Employment: retired   Living Situation: Lives at home with two cats.   Recent Travel: None  Primary Care Provider: Reviewed Baltazar Rojas M.D.  Other (stressors, spirituality, exposures): none    Allergies  No Known Allergies    Medications  Prior to Admission Medications   Prescriptions Last Dose Informant Patient Reported? Taking?   Cholecalciferol (VITAMIN D3) 50 MCG (2000 UT) Tab 1/17/2023 at AM Patient Yes Yes   Sig: Take 2,000 Units by mouth every morning.   aspirin EC (ECOTRIN) 81 MG Tablet Delayed Response 1/17/2023 at PM Patient Yes No   Sig: Take 81 mg by mouth every evening.   atorvastatin (LIPITOR) 20 MG Tab 1/17/2023 at AM Patient No No   Sig: Take 1 Tab by mouth every evening.   carvedilol (COREG) 6.25 MG Tab 1/18/2023 at AM Patient Yes No   Sig: Take 6.25 mg by mouth 2 times a day.   famotidine (PEPCID) 20 MG Tab 1/17/2023 at AM Patient Yes No   Sig: Take 20 mg by mouth 2 times a day.   lansoprazole (PREVACID) 30 MG CAPSULE DELAYED RELEASE 1/17/2023 at AM Patient Yes Yes   Sig: Take 30 mg by mouth every day.   latanoprost (XALATAN) 0.005 % SOLN 1/17/2023 at PM Patient Yes No   Sig: Administer 1 Drop into both eyes 1/2 hour after lunch.   levothyroxine (SYNTHROID) 100 MCG TABS 1/17/2023 at PM Patient Yes No   Sig: Take 100 mcg by mouth see administration instructions. Monday, Tuesday, Wednesday, Thursday, Friday ONLY   lisinopril (PRINIVIL) 10 MG  TABS 1/18/2023 at AM Patient Yes No   Sig: Take 10 mg by mouth every morning.   mag hydrox-al hydrox-simeth (MAALOX PLUS ES OR MYLANTA DS) 400-400-40 MG/5ML Suspension 1/17/2023 at PM Patient Yes Yes   Sig: Take 10 mL by mouth every evening.      Facility-Administered Medications: None       Physical Exam  Temp:  [36.7 °C (98 °F)] 36.7 °C (98 °F)  Pulse:  [64-76] 71  Resp:  [16-18] 16  BP: (135-174)/(63-82) 174/82  SpO2:  [96 %-98 %] 98 %  Blood Pressure : (!) 174/82   Temperature: 36.7 °C (98 °F)   Pulse: 71   Respiration: 16   Pulse Oximetry: 98 %       Physical Exam  Vitals and nursing note reviewed.   Constitutional:       General: She is not in acute distress.     Appearance: Normal appearance. She is not ill-appearing or toxic-appearing.   HENT:      Head: Normocephalic and atraumatic.   Cardiovascular:      Rate and Rhythm: Normal rate and regular rhythm.      Heart sounds: Normal heart sounds. No murmur heard.    No friction rub. No gallop.   Pulmonary:      Effort: Pulmonary effort is normal. No respiratory distress.      Breath sounds: Normal breath sounds. No stridor. No wheezing, rhonchi or rales.   Abdominal:      General: Abdomen is flat. There is no distension.      Palpations: There is no mass.      Tenderness: There is abdominal tenderness. There is no right CVA tenderness, left CVA tenderness, guarding or rebound.      Comments: Hypoactive bowel sounds. RLQ pain with palpation only. LLQ pain at rest and with palpation.    Musculoskeletal:      Right lower leg: No edema.      Left lower leg: No edema.   Skin:     General: Skin is warm and dry.      Coloration: Skin is not pale.      Findings: Lesion present. No bruising.      Comments: Left cheek lesion from previous mohs surgery.    Neurological:      Mental Status: She is alert and oriented to person, place, and time.       Laboratory:  Recent Labs     01/18/23  0952   WBC 6.2   RBC 4.04*   HEMOGLOBIN 12.3   HEMATOCRIT 37.7   MCV 93.3   MCH 30.4    MCHC 32.6*   RDW 44.1   PLATELETCT 238   MPV 8.1*     Recent Labs     01/18/23  0952   SODIUM 141   POTASSIUM 3.9   CHLORIDE 108   CO2 22   GLUCOSE 94   BUN 23*   CREATININE 0.48*   CALCIUM 9.3     Recent Labs     01/18/23  0952   ALTSGPT 5   ASTSGOT 10*   ALKPHOSPHAT 79   TBILIRUBIN 0.4   LIPASE 32   GLUCOSE 94         No results for input(s): NTPROBNP in the last 72 hours.      No results for input(s): TROPONINT in the last 72 hours.    Imaging:  CT-ABDOMEN-PELVIS WITH   Final Result      1.  There is sigmoid diverticulosis with heterogeneous hypodensities in the wall of the sigmoid colon with colonic stranding. While this could represent changes of diverticulitis, underlying mass is also a possibility based on this appearance.   2.  No other interval changes.      CTA ABDOMEN PELVIS W & W/O POST PROCESS    (Results Pending)       CT-ABDOMEN-PELVIS WITH    Result Date: 1/18/2023  1.  There is sigmoid diverticulosis with heterogeneous hypodensities in the wall of the sigmoid colon with colonic stranding. While this could represent changes of diverticulitis, underlying mass is also a possibility based on this appearance. 2.  No other interval changes.       Assessment/Plan:  Problem Representation:     * Left Lower Quadrant Pain - (present on admission)  Assessment & Plan  Ddx diverticulitis vs mesenteric ischemia (pain is located in watershed area) vs colon cancer. Perforation or abscess less likely as pt   -Mesenteric ischemia possible as pt has hx smoking and chronic pain exacerbated immediately after food ingestion. Pain also located in watershed area. CT 1/18 also noted atherosclerosis.   Plan:   -CTA to r/o mesenteric ischemia.   -NPO   -Pain meds- tylenol. Will consider adding oxycodone 5mg if not controlled with tylenol (aware that oxycodone can cause constipation however, pain medication options are limited).   -Cipro and Metronidazole for 10 days.   -Consulted GI, appreciate recommendations.      Nausea  Assessment & Plan  Ddx mesenteric ischemia, GERD, vs hiatal hernia.   -nausea and light-headed feeling with food/water intake. No relief with ondansetron. Lightheaded   -She reported that she has not eaten since 4pm yesterday. UA notable for ketonuria (consistent with fasting)   Plan:   -NPO   -Continue Mylanta   -Will try prochlorperazine 10Q6 PRN.      Gastroesophageal reflux disease  Assessment & Plan  -Pt reported hx hiatal hernia however on further chart review found CT scan from 2004 that noted small Bochdalek hernia. No hiatal hernia noted.   -Continue home famotidine and lansoprazole.     Benign essential HTN- (present on admission)  Assessment & Plan  -BP elevated on admission   -Continue home carvedilol and lisinopril.     Hypothyroidism  Assessment & Plan  -continue home levothyroxine     Mixed hyperlipidemia- (present on admission)  Assessment & Plan  -continue home atorvastatin     Other specified glaucoma  Assessment & Plan  -continue home latanoprost     I anticipate this patient will require at least two midnights for appropriate medical management, necessitating inpatient admission because her suspected condition typically requires 2-3 days before showing improvement.     Patient will need a Med/Surg bed on MEDICAL service .  The need is secondary to need for treatment and observation for at least next 2-3 days.    VTE prophylaxis: Xarelto 10 mg daily as prophylaxis

## 2023-01-18 NOTE — ASSESSMENT & PLAN NOTE
-nausea with food/water intake. No relief with ondansetron.   -She reported that she has not eaten since 4pm yesterday. UA notable for ketonuria (consistent with fasting)   Plan:   -NPO   -Continue Mylanta   -Will try prochlorperazine 10Q6 PRN.

## 2023-01-18 NOTE — ED TRIAGE NOTES
"Chief Complaint   Patient presents with    Abdominal Pain     Pt presents with persistent nausea since June and LLQ pain intermittently without relief from pepcid, zofran and prevacid. Pt states she is nauseous before and after eating. Pt has had CT's done in the past. Pt states she needs an endoscopy and has a GI appointment in three weeks but does not think she can wait that long.    /68   Pulse 74   Temp 36.7 °C (98 °F) (Temporal)   Resp 18   Ht 1.549 m (5' 1\")   Wt 77.1 kg (170 lb)   SpO2 97%   BMI 32.12 kg/m²     BIB EMS from home.   "

## 2023-01-18 NOTE — ED NOTES
Med Rec Complete per patient  Allergies Reviewed with patient  No antibiotics within the last 30 days  Patient's Preferred Pharmacy:  [address]

## 2023-01-18 NOTE — ASSESSMENT & PLAN NOTE
-Pt reported hx hiatal hernia however on further chart review found CT scan from 2004 that noted small Bochdalek hernia. No hiatal hernia noted.   -Continue home famotidine and lansoprazole.

## 2023-01-18 NOTE — ASSESSMENT & PLAN NOTE
Ddx diverticulitis vs mesenteric ischemia (pain is located in watershed area) vs colon cancer.   -Mesenteric ischemia possible as pt has hx smoking and chronic pain exacerbated immediately after food ingestion. Pain also located in watershed area.   Plan:   -CTA to r/o mesenteric ischemia.   -NPO   -Pain meds- tylenol,   -Ceftriaxone 2g daily. Afterwards will switch to Augmentin PO for 10-14 days.   -Consulted GI, appreciate recommendations.

## 2023-01-18 NOTE — ED PROVIDER NOTES
ER Provider Note    Scribed for Galileo Nagel M.d. by Byron Davis. 1/18/2023  9:49 AM    Primary Care Provider: Baltazar Rojas M.D.    CHIEF COMPLAINT  Chief Complaint   Patient presents with    Abdominal Pain     Pt presents with persistent nausea since June and LLQ pain intermittently without relief from pepcid, zofran and prevacid. Pt states she is nauseous before and after eating. Pt has had CT's done in the past. Pt states she needs an endoscopy and has a GI appointment in three weeks but does not think she can wait that long.      EXTERNAL RECORDS REVIEWED  Other Reviewed previous imaging.    HPI/ROS  LIMITATION TO HISTORY   Select: : None    Maryam Gomez is a 77 y.o. female who presents to the ED complaining of nausea onset two weeks ago. Patient reports that she had constant nausea beginning seven months ago. She had tests done to evaluate her symptoms, including a CT scan, and they all came back negative. She began to feel better and saw her GI (Dr. Carter) in November of 2022, where they decided not to perform a colonoscopy or endoscopy. She has since developed her constant nausea again. She is experiencing associated waxing and waning sharp lower abdominal pains, decreased fluid intake, light headedness, and constipation.  She comes in today because of worsening left lower quadrant pain does not radiate.  Nothing makes it better or worse.  She is not eating or drinking because of the pain.  Her last bowel movement was this morning, but she reports that it was very small. Patient denies any chest pain, dysuria, diarrhea, or numbness or tingling in any extremities. Patient reports a history of dependant edema, carpal tunnel syndrome of the hands, hypertension, Glaucoma, high cholesterol, and sever Acid reflux. She denies any history of diabetes. Patient treats her pain with tylenol and takes calcium and vitamin D. She states that Zofran does nothing for her nausea.    PAST MEDICAL  HISTORY  Past Medical History:   Diagnosis Date    Anxiety     Arthritis     osteoarthritis    Cramp of limb 3/16/2012    Diastolic dysfunction 3/16/2012    DVT (deep venous thrombosis) (HCC)     after a fall with 6mo coumadin    Edema 3/16/2012    GERD (gastroesophageal reflux disease)     Glaucoma     Heart burn     right shoulder    Hiatus hernia syndrome     Hypercholesteremia     Hyperlipidemia 3/16/2012    Hypertension     Hypothyroid     Indigestion     Osteopenia     Palpitations 3/16/2012    [place]        SURGICAL HISTORY  Past Surgical History:   Procedure Laterality Date    CLOSED REDUCTION Left 5/16/2021    Procedure: CLOSED REDUCTION;  Surgeon: Baltazar Hamilton M.D.;  Location: SURGERY [facility];  Service: Orthopedics    COLONOSCOPY N/A 8/4/2018    Procedure: COLONOSCOPY;  Surgeon: Lucy Carter M.D.;  Location: SURGERY [facility];  Service: Gastroenterology    SHOULDER DECOMPRESSION ARTHROSCOPIC Right 5/18/2016    Procedure: SHOULDER DECOMPRESSION ARTHROSCOPIC - SUBACROMIAL;  Surgeon: Joseph Ricketts M.D.;  Location: SURGERY [facility];  Service:     SHOULDER ARTHROSCOPY W/ ROTATOR CUFF REPAIR Right 5/18/2016    Procedure: SHOULDER ARTHROSCOPY W/ ROTATOR CUFF REPAIR ;  Surgeon: Joseph Ricketts M.D.;  Location: SURGERY [facility];  Service:     HIP ARTHROPLASTY TOTAL  11/14/2012    Performed by Joseph Ricketts M.D. at [facility]    FOOT SURGERY  1993    OTHER  1990    nose    HYSTERECTOMY LAPAROSCOPY  1984    TONSILLECTOMY  1965       FAMILY HISTORY  Family History   Problem Relation Age of Onset    Heart Attack Neg Hx     Heart Disease Neg Hx     Heart Failure Neg Hx        SOCIAL HISTORY   reports that she quit smoking about 34 years ago. Her smoking use included cigarettes. She has a 27.00 pack-year smoking history. She has never used smokeless tobacco. She reports current alcohol use. She reports that she does not use drugs.    CURRENT  "MEDICATIONS  Previous Medications    ASPIRIN EC (ECOTRIN) 81 MG TABLET DELAYED RESPONSE    Take 81 mg by mouth every evening.    ATORVASTATIN (LIPITOR) 20 MG TAB    Take 1 Tab by mouth every evening.    B COMPLEX VITAMINS (VITAMIN B COMPLEX PO)    Take 1 Tab by mouth every day.    CALCIUM-MAGNESIUM-VITAMIN D (CITRACAL CALCIUM+D PO)    Take 1 Tab by mouth every day.    CARVEDILOL (COREG) 6.25 MG TAB    Take 6.25 mg by mouth 2 times a day.    FAMOTIDINE (PEPCID) 20 MG TAB    Take 20 mg by mouth 2 times a day.    GLUCOSAMINE-CHONDROITIN (COSAMIN DS PO)    Take 1 Tab by mouth every day.    LANSOPRAZOLE (PREVACID) 30 MG CAPSULE DELAYED RELEASE    Take 30 mg by mouth 2 times a day.    LATANOPROST (XALATAN) 0.005 % SOLN    Administer 1 Drop into both eyes 1/2 hour after lunch.    LEVOTHYROXINE (SYNTHROID) 100 MCG TABS    Take 100 mcg by mouth see administration instructions. Monday, Tuesday, Wednesday, Thursday, Friday ONLY    LISINOPRIL (PRINIVIL) 10 MG TABS    Take 10 mg by mouth every morning.    MAG HYDROX-AL HYDROX-SIMETH (MAALOX PLUS ES OR MYLANTA DS) 400-400-40 MG/5ML SUSPENSION    Take 10 mL by mouth 4 times a day as needed (abdominal pain/nausea).    MULTIPLE VITAMINS-MINERALS (MULTI COMPLETE PO)    Take 1 Tab by mouth every day.    OMEGA-3 FATTY ACIDS (FISH OIL) 1200 MG CAP    Take 1,200 mg by mouth every day.    ONDANSETRON (ZOFRAN ODT) 4 MG TABLET DISPERSIBLE    Take 1 Tablet by mouth every 6 hours as needed for Nausea.       ALLERGIES  Patient has no known allergies.    PHYSICAL EXAM  /68   Pulse 74   Temp 36.7 °C (98 °F) (Temporal)   Resp 18   Ht 1.549 m (5' 1\")   Wt 77.1 kg (170 lb)   SpO2 97%   BMI 32.12 kg/m²   Constitutional: Well developed, Well nourished, No acute distress, Non-toxic appearance.   HENT: Normocephalic, Atraumatic, Bilateral external ears normal, Oropharynx moist, No oral exudates, Nose normal.   Eyes: PERRL, EOMI, Conjunctiva normal, No discharge.   Neck: Normal range of " motion, No tenderness, Supple, No stridor.   Cardiovascular: Normal heart rate, Normal rhythm, No murmurs, No rubs, No gallops.   Thorax & Lungs: Normal breath sounds, No respiratory distress, No wheezing, No chest tenderness.   Abdomen: Bowel sounds normal, Soft, Left Lower Quadrant tenderness.   Skin: Warm, Dry, No erythema, No rash.   Back: No tenderness, No CVA tenderness.   Musculoskeletal: Good range of motion in all major joints.  Neurologic: Alert, Normal motor function, Normal sensory function, No focal deficits noted.   Psychiatric: Affect normal     DIAGNOSTIC STUDIES    Labs:   Results for orders placed or performed during the hospital encounter of 01/18/23   CBC WITH DIFFERENTIAL   Result Value Ref Range    WBC 6.2 4.8 - 10.8 K/uL    RBC 4.04 (L) 4.20 - 5.40 M/uL    Hemoglobin 12.3 12.0 - 16.0 g/dL    Hematocrit 37.7 37.0 - 47.0 %    MCV 93.3 81.4 - 97.8 fL    MCH 30.4 27.0 - 33.0 pg    MCHC 32.6 (L) 33.6 - 35.0 g/dL    RDW 44.1 35.9 - 50.0 fL    Platelet Count 238 164 - 446 K/uL    MPV 8.1 (L) 9.0 - 12.9 fL    Neutrophils-Polys 63.60 44.00 - 72.00 %    Lymphocytes 22.60 22.00 - 41.00 %    Monocytes 8.30 0.00 - 13.40 %    Eosinophils 4.20 0.00 - 6.90 %    Basophils 1.00 0.00 - 1.80 %    Immature Granulocytes 0.30 0.00 - 0.90 %    Nucleated RBC 0.00 /100 WBC    Neutrophils (Absolute) 3.91 2.00 - 7.15 K/uL    Lymphs (Absolute) 1.39 1.00 - 4.80 K/uL    Monos (Absolute) 0.51 0.00 - 0.85 K/uL    Eos (Absolute) 0.26 0.00 - 0.51 K/uL    Baso (Absolute) 0.06 0.00 - 0.12 K/uL    Immature Granulocytes (abs) 0.02 0.00 - 0.11 K/uL    NRBC (Absolute) 0.00 K/uL   COMP METABOLIC PANEL   Result Value Ref Range    Sodium 141 135 - 145 mmol/L    Potassium 3.9 3.6 - 5.5 mmol/L    Chloride 108 96 - 112 mmol/L    Co2 22 20 - 33 mmol/L    Anion Gap 11.0 7.0 - 16.0    Glucose 94 65 - 99 mg/dL    Bun 23 (H) 8 - 22 mg/dL    Creatinine 0.48 (L) 0.50 - 1.40 mg/dL    Calcium 9.3 8.5 - 10.5 mg/dL    AST(SGOT) 10 (L) 12 - 45 U/L     ALT(SGPT) 5 2 - 50 U/L    Alkaline Phosphatase 79 30 - 99 U/L    Total Bilirubin 0.4 0.1 - 1.5 mg/dL    Albumin 3.8 3.2 - 4.9 g/dL    Total Protein 6.6 6.0 - 8.2 g/dL    Globulin 2.8 1.9 - 3.5 g/dL    A-G Ratio 1.4 g/dL   LIPASE   Result Value Ref Range    Lipase 32 11 - 82 U/L   LACTIC ACID   Result Value Ref Range    Lactic Acid 1.0 0.5 - 2.0 mmol/L   URINALYSIS CULTURE, IF INDICATED    Specimen: Urine, Cath   Result Value Ref Range    Color Yellow     Character Clear     Specific Gravity 1.045 <1.035    Ph 5.5 5.0 - 8.0    Glucose Negative Negative mg/dL    Ketones Trace (A) Negative mg/dL    Protein Negative Negative mg/dL    Bilirubin Negative Negative    Urobilinogen, Urine 0.2 Negative    Nitrite Negative Negative    Leukocyte Esterase Negative Negative    Occult Blood Negative Negative    Micro Urine Req see below    CORRECTED CALCIUM   Result Value Ref Range    Correct Calcium 9.5 8.5 - 10.5 mg/dL   ESTIMATED GFR   Result Value Ref Range    GFR (CKD-EPI) 96 >60 mL/min/1.73 m 2      Radiology:   The attending emergency physician has independently interpreted the diagnostic imaging associated with this visit and am waiting the final reading from the radiologist.     CT-ABDOMEN-PELVIS WITH   Final Result      1.  There is sigmoid diverticulosis with heterogeneous hypodensities in the wall of the sigmoid colon with colonic stranding. While this could represent changes of diverticulitis, underlying mass is also a possibility based on this appearance.   2.  No other interval changes.         interpreted by the radiologist and reviewed by me.      COURSE & MEDICAL DECISION MAKING     ED Observation Status? Yes; I am placing the patient in to an observation status due to a diagnostic uncertainty as well as therapeutic intensity. Patient placed in observation status at 9:50 AM, 1/18/2023.     Observation plan is as follows: The patient will be admitted to observation while she is undergoing work-up to determine if  she has a diagnosis that requires hospitalization or discharge.        9:50 AM - Patient seen and examined at bedside. Discussed plan of care, including ordering a CT scan. Patient agrees to the plan of care. Ordered for UA culture if indicated, lactic acid, Lipase, CMP, CBC with differential, and CT-Abdomen pelvis with to evaluate her symptoms.      Differential diagnosis includes but is not limited to diverticulitis, hernia, colitis, UTI, pyelonephritis, renal colic, bowel ischemia, volvulus, CNS process causing chronic nausea.        1:00 PM - Upon reevaluation patient is complaining of discomfort. I spoke to her about the possibility of admission.     1:06 PM - I discussed the patient's case and the above findings with Dr. Taylor ([facility]) who agrees to admit the patient.      INITIAL ASSESSMENT AND PLAN  Care Narrative: Patient was reviewed by physical diagnosis.  Imaging shows acute diverticulitis.  This is consistent with her exam.  Her labs are fairly reassuring but she is having a difficult time tolerating food and fluids at home and overall she is not doing well.  Because of her pain and inability to tolerate fluids we will treat her with IV antibiotics and fluids.  Monitor she will require hospitalist for further work-up and treatment.      Discussed with the admitting team.        Upon Reevaluation, the patient's condition has: not improved; and will be escalated to hospitalization.    Patient discharged from ED Observation status at today at 1310.      ADDITIONAL PROBLEM LIST AND DISPOSITION        I have discussed management of the patient with the following physicians and FELIPE's: Hospitalist team    Discussion of management with other \[facility] [place] [facility]\"" or appropriate source(s): None       Patient will be hospitalized by Dr. Taylor.     FINAL DIANGOSIS  1. Left lower quadrant abdominal pain    2. Diverticulitis    3. Vomiting with nausea, not intractable          The note accurately reflects work and  decisions made by me.  Galileo Nagel M.D.  1/18/2023  2:54 PM     IByron (Scribe), am scribing for, and in the presence of, Galileo Nagel M.D..    Electronically signed by: Byron Davis ([email or web address]), 1/18/2023    Galileo GAMINO M.D. personally performed the services described in this documentation, as scribed by Byron Davis in my presence, and it is both accurate and complete.

## 2023-01-18 NOTE — ED NOTES
Pt to CT   Discussed with Fellow Lillie  Called patients daughter in order to get consent for possible tesio placement -left message  If family is agreeable will possibly take to OR monday for tesio placement    normal...

## 2023-01-18 NOTE — PROGRESS NOTES
A/P    Maryam Gomez is a 77 y.o. female with h/o of , H pylori gastritis, diverticulosis, haitul hernia, BRBPR 2018 due sigmoid colon ulcer thought ischemic (had colonscopy with biopsy-- indeterminate) , ex 30 packyear tobacco p/w 6 months of nause, weight loss and LLQ pain      # LLQ pain due to diverticulitis vs colon ischemia vs colonic mass  - patient has post prandial nausea and abdominal discomfert for 6 months, also ahs had continous LLQ discomfert that she assocaites as same problem. Denies heart burn, dysphagia, odynopahgia, dhirhea, hematochezia or melena  - denies h/o of diverticulitis  - CT scan noted LLQ mesentric stranding, and possible mass  - she has h/o of sigmoid ulcers/ischemia  - given watershed region and history post prandial abdominal pain/nausea will get CTA angiography  to asses for chronic colonic ischemia  - will treat as acute diverticulitis with cipor/flagyl x 10 days   - will need colonscopy in 6 weeks r/o colon cancer  - GI consulted as the chronicity of her symptoms more consistent with subacute post prandial ischemia vs smoldering diverticulitis

## 2023-01-19 ENCOUNTER — APPOINTMENT (OUTPATIENT)
Dept: RADIOLOGY | Facility: MEDICAL CENTER | Age: 78
DRG: 391 | End: 2023-01-19
Payer: MEDICARE

## 2023-01-19 LAB
ALBUMIN SERPL BCP-MCNC: 3.3 G/DL (ref 3.2–4.9)
ALBUMIN/GLOB SERPL: 1.2 G/DL
ALP SERPL-CCNC: 74 U/L (ref 30–99)
ALT SERPL-CCNC: 6 U/L (ref 2–50)
ANION GAP SERPL CALC-SCNC: 13 MMOL/L (ref 7–16)
AST SERPL-CCNC: 10 U/L (ref 12–45)
BILIRUB SERPL-MCNC: 0.4 MG/DL (ref 0.1–1.5)
BUN SERPL-MCNC: 15 MG/DL (ref 8–22)
CALCIUM ALBUM COR SERPL-MCNC: 9.1 MG/DL (ref 8.5–10.5)
CALCIUM SERPL-MCNC: 8.5 MG/DL (ref 8.5–10.5)
CHLORIDE SERPL-SCNC: 108 MMOL/L (ref 96–112)
CO2 SERPL-SCNC: 17 MMOL/L (ref 20–33)
CREAT SERPL-MCNC: 0.35 MG/DL (ref 0.5–1.4)
ERYTHROCYTE [DISTWIDTH] IN BLOOD BY AUTOMATED COUNT: 44.2 FL (ref 35.9–50)
GFR SERPLBLD CREATININE-BSD FMLA CKD-EPI: 104 ML/MIN/1.73 M 2
GLOBULIN SER CALC-MCNC: 2.8 G/DL (ref 1.9–3.5)
GLUCOSE SERPL-MCNC: 72 MG/DL (ref 65–99)
HCT VFR BLD AUTO: 35.6 % (ref 37–47)
HGB BLD-MCNC: 11.5 G/DL (ref 12–16)
MCH RBC QN AUTO: 30 PG (ref 27–33)
MCHC RBC AUTO-ENTMCNC: 32.3 G/DL (ref 33.6–35)
MCV RBC AUTO: 93 FL (ref 81.4–97.8)
PLATELET # BLD AUTO: 223 K/UL (ref 164–446)
PMV BLD AUTO: 8.2 FL (ref 9–12.9)
POTASSIUM SERPL-SCNC: 3.7 MMOL/L (ref 3.6–5.5)
PROT SERPL-MCNC: 6.1 G/DL (ref 6–8.2)
RBC # BLD AUTO: 3.83 M/UL (ref 4.2–5.4)
SODIUM SERPL-SCNC: 138 MMOL/L (ref 135–145)
WBC # BLD AUTO: 5.2 K/UL (ref 4.8–10.8)

## 2023-01-19 PROCEDURE — 85027 COMPLETE CBC AUTOMATED: CPT

## 2023-01-19 PROCEDURE — 700102 HCHG RX REV CODE 250 W/ 637 OVERRIDE(OP)

## 2023-01-19 PROCEDURE — 80053 COMPREHEN METABOLIC PANEL: CPT

## 2023-01-19 PROCEDURE — 36415 COLL VENOUS BLD VENIPUNCTURE: CPT

## 2023-01-19 PROCEDURE — 700111 HCHG RX REV CODE 636 W/ 250 OVERRIDE (IP)

## 2023-01-19 PROCEDURE — 99233 SBSQ HOSP IP/OBS HIGH 50: CPT | Mod: GC | Performed by: HOSPITALIST

## 2023-01-19 PROCEDURE — A9270 NON-COVERED ITEM OR SERVICE: HCPCS

## 2023-01-19 PROCEDURE — 770006 HCHG ROOM/CARE - MED/SURG/GYN SEMI*

## 2023-01-19 RX ORDER — OMEPRAZOLE 20 MG/1
20 CAPSULE, DELAYED RELEASE ORAL DAILY
Status: DISCONTINUED | OUTPATIENT
Start: 2023-01-20 | End: 2023-01-20

## 2023-01-19 RX ADMIN — ACETAMINOPHEN 1000 MG: 500 TABLET ORAL at 01:10

## 2023-01-19 RX ADMIN — CARVEDILOL 6.25 MG: 6.25 TABLET, FILM COATED ORAL at 05:23

## 2023-01-19 RX ADMIN — ACETAMINOPHEN 1000 MG: 500 TABLET ORAL at 22:14

## 2023-01-19 RX ADMIN — CIPROFLOXACIN 500 MG: 500 TABLET, FILM COATED ORAL at 18:17

## 2023-01-19 RX ADMIN — RIVAROXABAN 10 MG: 10 TABLET, FILM COATED ORAL at 18:17

## 2023-01-19 RX ADMIN — LEVOTHYROXINE SODIUM 100 MCG: 0.1 TABLET ORAL at 05:15

## 2023-01-19 RX ADMIN — FAMOTIDINE 20 MG: 20 TABLET, FILM COATED ORAL at 05:14

## 2023-01-19 RX ADMIN — LATANOPROST 1 DROP: 50 SOLUTION OPHTHALMIC at 13:00

## 2023-01-19 RX ADMIN — METRONIDAZOLE 500 MG: 500 TABLET ORAL at 14:14

## 2023-01-19 RX ADMIN — METRONIDAZOLE 500 MG: 500 TABLET ORAL at 20:45

## 2023-01-19 RX ADMIN — CARVEDILOL 6.25 MG: 6.25 TABLET, FILM COATED ORAL at 20:45

## 2023-01-19 RX ADMIN — ACETAMINOPHEN 1000 MG: 500 TABLET ORAL at 14:14

## 2023-01-19 RX ADMIN — ALUMINUM HYDROXIDE, MAGNESIUM HYDROXIDE, AND DIMETHICONE 10 ML: 400; 400; 40 SUSPENSION ORAL at 20:43

## 2023-01-19 RX ADMIN — LISINOPRIL 10 MG: 10 TABLET ORAL at 05:15

## 2023-01-19 RX ADMIN — PROCHLORPERAZINE EDISYLATE 10 MG: 5 INJECTION INTRAMUSCULAR; INTRAVENOUS at 05:23

## 2023-01-19 RX ADMIN — OMEPRAZOLE 20 MG: 20 CAPSULE, DELAYED RELEASE ORAL at 05:15

## 2023-01-19 RX ADMIN — CIPROFLOXACIN 500 MG: 500 TABLET, FILM COATED ORAL at 05:15

## 2023-01-19 RX ADMIN — ATORVASTATIN CALCIUM 20 MG: 20 TABLET, FILM COATED ORAL at 18:17

## 2023-01-19 RX ADMIN — METRONIDAZOLE 500 MG: 500 TABLET ORAL at 05:15

## 2023-01-19 ASSESSMENT — PAIN DESCRIPTION - PAIN TYPE
TYPE: ACUTE PAIN

## 2023-01-19 NOTE — CARE PLAN
The patient is Stable - Low risk of patient condition declining or worsening    Shift Goals  Clinical Goals: NPO  Patient Goals: rest    Progress made toward(s) clinical / shift goals:    Problem: Fall Risk  Goal: Patient will remain free from falls  Outcome: Progressing     Problem: Knowledge Deficit - Standard  Goal: Patient and family/care givers will demonstrate understanding of plan of care, disease process/condition, diagnostic tests and medications  Outcome: Progressing     Problem: Pain - Standard  Goal: Alleviation of pain or a reduction in pain to the patient’s comfort goal  Outcome: Progressing

## 2023-01-19 NOTE — PROGRESS NOTES
4 Eyes Skin Assessment Completed by Breana Ruby, LINDA and Louisa Lee, LINDA.    Head WDL  Ears WDL  Nose WDL  Mouth WDL  Neck WDL  Breast/Chest WDL  Shoulder Blades WDL  Spine WDL  (R) Arm/Elbow/Hand WDL  (L) Arm/Elbow/Hand WDL  Abdomen WDL  Groin WDL  Scrotum/Coccyx/Buttocks WDL  (R) Leg flaky and dry  (L) Leg WDL flaky and dry  (R) Heel/Foot/Toe callus on toes heel intact  (L) Heel/Foot/Toe callus on toes heel intact          Devices In Places n/a      Interventions In Place Pillows, Dri-Henrique Pads, and Heels Loaded W/Pillows    Possible Skin Injury No    Pictures Uploaded Into Epic N/A  Wound Consult Placed N/A  RN Wound Prevention Protocol Ordered No       Small open wound on left flank area, with dressing,

## 2023-01-19 NOTE — DIETARY
"Nutrition services: Day 1 of admit.  Maryam Gomez is a 77 y.o. female with admitting DX of Diverticulosis [K57.90]    Consult received for MST 3: 14-23 lb x 3 months, poor PO intake PTA. Per chart notes, sx onset in June of 2022. Pt reports wt 175 lb from June until December; states 15 lb wt loss to 160 lb since then. Pt also reports recent falls. Reports PO intake ~50% usual x 1 month. Reports she feels hungry but is \"tense\" d/t nervous to eat; states hx of nausea w/o emesis, some constipation. RD reviewed stress-management techniques. RD reviewed importance of adequate fluid intake, as well as recommended foods w/ diverticulitis.    Assessment:  Height: 154.9 cm (5' 1\")  Weight: 72.6 kg (160 lb)  Body mass index is 30.23 kg/m²., BMI classification: Obesity class I  Diet/Intake: Low fiber/GI soft; diet just advanced for lunch from NPO    Evaluation:   Dx list: diverticulitis, HTN, GERD, HLD, nausea, glaucoma, hypothyroidism.  Labs: crea 0.35, AST 10   MAR: synthroid, NS, prilosec, PRN compazine  Skin: no documented wounds/edema  +BM: PTA    Malnutrition Risk: Per pt report, meets criteria for severe chronic malnutrition r/t chronic nausea and diverticulitis AEB pt report of PO intake 50% of normal x 1 month and reported wt loss 8.6% in 1 month.    Recommendations/Plan:  TID [employer] yogurts w/ meals per pt.   Encourage intake of meals >50%  Document intake of all PO as % taken in ADL's to provide interdisciplinary communication across all shifts.   Monitor weight.  Nutrition rep will continue to see patient for ongoing meal and snack preferences.     RD following.  "

## 2023-01-20 ENCOUNTER — APPOINTMENT (OUTPATIENT)
Dept: RADIOLOGY | Facility: MEDICAL CENTER | Age: 78
DRG: 391 | End: 2023-01-20
Payer: MEDICARE

## 2023-01-20 PROCEDURE — 700102 HCHG RX REV CODE 250 W/ 637 OVERRIDE(OP)

## 2023-01-20 PROCEDURE — 770006 HCHG ROOM/CARE - MED/SURG/GYN SEMI*

## 2023-01-20 PROCEDURE — 99233 SBSQ HOSP IP/OBS HIGH 50: CPT | Mod: GC | Performed by: HOSPITALIST

## 2023-01-20 PROCEDURE — A9270 NON-COVERED ITEM OR SERVICE: HCPCS

## 2023-01-20 PROCEDURE — 74174 CTA ABD&PLVS W/CONTRAST: CPT

## 2023-01-20 PROCEDURE — 700111 HCHG RX REV CODE 636 W/ 250 OVERRIDE (IP)

## 2023-01-20 PROCEDURE — 700117 HCHG RX CONTRAST REV CODE 255

## 2023-01-20 RX ORDER — OMEPRAZOLE 20 MG/1
40 CAPSULE, DELAYED RELEASE ORAL 2 TIMES DAILY
Status: COMPLETED | OUTPATIENT
Start: 2023-01-20 | End: 2023-01-20

## 2023-01-20 RX ORDER — METOCLOPRAMIDE HYDROCHLORIDE 5 MG/ML
10 INJECTION INTRAMUSCULAR; INTRAVENOUS EVERY 6 HOURS PRN
Status: ACTIVE | OUTPATIENT
Start: 2023-01-20 | End: 2023-01-21

## 2023-01-20 RX ADMIN — CIPROFLOXACIN 500 MG: 500 TABLET, FILM COATED ORAL at 18:00

## 2023-01-20 RX ADMIN — LISINOPRIL 10 MG: 10 TABLET ORAL at 04:56

## 2023-01-20 RX ADMIN — ACETAMINOPHEN 1000 MG: 500 TABLET ORAL at 04:55

## 2023-01-20 RX ADMIN — SENNOSIDES AND DOCUSATE SODIUM 2 TABLET: 50; 8.6 TABLET ORAL at 04:56

## 2023-01-20 RX ADMIN — CIPROFLOXACIN 500 MG: 500 TABLET, FILM COATED ORAL at 04:56

## 2023-01-20 RX ADMIN — METRONIDAZOLE 500 MG: 500 TABLET ORAL at 22:25

## 2023-01-20 RX ADMIN — METRONIDAZOLE 500 MG: 500 TABLET ORAL at 04:56

## 2023-01-20 RX ADMIN — LIDOCAINE HYDROCHLORIDE 15 ML: 20 SOLUTION OROPHARYNGEAL at 12:29

## 2023-01-20 RX ADMIN — METRONIDAZOLE 500 MG: 500 TABLET ORAL at 14:37

## 2023-01-20 RX ADMIN — OMEPRAZOLE 20 MG: 20 CAPSULE, DELAYED RELEASE ORAL at 05:14

## 2023-01-20 RX ADMIN — PROCHLORPERAZINE EDISYLATE 10 MG: 5 INJECTION INTRAMUSCULAR; INTRAVENOUS at 04:53

## 2023-01-20 RX ADMIN — OMEPRAZOLE 40 MG: 20 CAPSULE, DELAYED RELEASE ORAL at 18:00

## 2023-01-20 RX ADMIN — LEVOTHYROXINE SODIUM 100 MCG: 0.1 TABLET ORAL at 04:56

## 2023-01-20 RX ADMIN — LIDOCAINE HYDROCHLORIDE 15 ML: 20 SOLUTION OROPHARYNGEAL at 23:38

## 2023-01-20 RX ADMIN — CARVEDILOL 6.25 MG: 6.25 TABLET, FILM COATED ORAL at 19:30

## 2023-01-20 RX ADMIN — RIVAROXABAN 10 MG: 10 TABLET, FILM COATED ORAL at 18:00

## 2023-01-20 RX ADMIN — ATORVASTATIN CALCIUM 20 MG: 20 TABLET, FILM COATED ORAL at 18:00

## 2023-01-20 RX ADMIN — IOHEXOL 85 ML: 350 INJECTION, SOLUTION INTRAVENOUS at 00:20

## 2023-01-20 RX ADMIN — CARVEDILOL 6.25 MG: 6.25 TABLET, FILM COATED ORAL at 07:30

## 2023-01-20 RX ADMIN — LATANOPROST 1 DROP: 50 SOLUTION OPHTHALMIC at 12:32

## 2023-01-20 ASSESSMENT — PATIENT HEALTH QUESTIONNAIRE - PHQ9
2. FEELING DOWN, DEPRESSED, IRRITABLE, OR HOPELESS: NOT AT ALL
1. LITTLE INTEREST OR PLEASURE IN DOING THINGS: NOT AT ALL
SUM OF ALL RESPONSES TO PHQ9 QUESTIONS 1 AND 2: 0

## 2023-01-20 ASSESSMENT — PAIN DESCRIPTION - PAIN TYPE
TYPE: ACUTE PAIN

## 2023-01-20 NOTE — CARE PLAN
The patient is Stable - Low risk of patient condition declining or worsening    Shift Goals  Clinical Goals: tolerare diet,pain control  Patient Goals: rest    Progress made toward(s) clinical / shift goals:    Problem: Fall Risk  Goal: Patient will remain free from falls  Outcome: Progressing     Problem: Knowledge Deficit - Standard  Goal: Patient and family/care givers will demonstrate understanding of plan of care, disease process/condition, diagnostic tests and medications  Outcome: Progressing     Problem: Pain - Standard  Goal: Alleviation of pain or a reduction in pain to the patient’s comfort goal  Outcome: Progressing

## 2023-01-20 NOTE — PROGRESS NOTES
[facility] Internal Medicine Daily Progress Note    Date of Service  1/20/2023    [facility] Team: R IM White Team   Attending: Jorge L Mancuso M.d.  Senior Resident: Dr. Taylor  Intern:  Dr. Vieyra  Contact Number: [phone]    Chief Complaint  Maryam Gomez is a 77 y.o. female admitted 1/18/2023 with LLQ pain and nausea.     Hospital Course  Maryam Gomez is a 77 y.o. female with h/o of , H pylori gastritis, diverticulosis, haitul hernia, BRBPR 2018 due sigmoid colon ulcer thought ischemic (had colonscopy with biopsy-- indeterminate) , ex 30 packyear tobacco p/w 6 months of nause, weight loss and LLQ pain who presented with 8 month hx of LLQ pain and nausea. Her sx initially started June 2022 at which time she had a CT A/P, hida scan and H pylori test all of which were negative. She established with a GI physician and in November had an appointment where they decided to not pursue having an EGD or Colonoscopy since her sx were somewhat improving at that time. However, in the middle of December her sx returned and she has had constant nausea (without vomiting), lightheadedness, and LLQ pain. Her sx become intolerable so she presented to the ED 1/18.     Interval Problem Update  This morning Mrs Gomez said she still did not feel well. She said she had 6 episodes of diarrhea yesterday and said that she wanted to throw up but felt like she couldn't because she hadn't eaten anything. She said in the past a GI cocktail helped and that she would like to try that again. Later in the day she was seen again, her abdominal pain had resolved with GI cocktail but her nausea was still present.   -Ordered PRN GI Cocktail.   -Dced compazine and started pt on Reglan   -Increased Omeprazole to 40mg BID   -Decreased diet to clear fluids only. Will advance as tolerated   -Ordered gastric emptying study in AM.   -Hold all GI medications and NPO at midnight.     I have discussed this patient's plan of care and discharge plan  at IDT rounds today with Case Management, Nursing, Nursing leadership, and other members of the IDT team.    Consultants/Specialty  GI    Code Status  Full Code    Disposition  Patient is not medically cleared for discharge.   Anticipate discharge to to home with close outpatient follow-up.  I have placed the appropriate orders for post-discharge needs.    Review of Systems  Review of Systems   Constitutional:  Positive for malaise/fatigue and weight loss. Negative for chills and fever.   Respiratory:  Negative for cough, shortness of breath.    Cardiovascular:  Negative for chest pain, palpitations and leg swelling.        Ankle swelling   Gastrointestinal:  Positive for abdominal pain, diarrhea, heartburn and nausea. Negative for blood in stool, constipation, melena and vomiting.   Genitourinary:  Negative for dysuria, frequency and urgency.   Musculoskeletal:  Positive for joint pain. Negative for myalgias.   Neurological:  Negative for dizziness, weakness and headaches.        Lightheadedness      Physical Exam  Temp:  [36.3 °C (97.3 °F)-36.8 °C (98.2 °F)] 36.3 °C (97.3 °F)  Pulse:  [66-86] 74  Resp:  [16-19] 17  BP: (126-167)/(78-94) 150/89  SpO2:  [95 %-96 %] 96 %    Physical Exam  Vitals and nursing note reviewed.   Constitutional:       General: She is not in acute distress.     Appearance: She is ill-appearing. She is not toxic-appearing.   Cardiovascular:      Rate and Rhythm: Normal rate and regular rhythm.      Heart sounds: Normal heart sounds. No murmur heard.    No friction rub. No gallop.   Pulmonary:      Effort: Pulmonary effort is normal. No respiratory distress.      Breath sounds: Normal breath sounds. No stridor. No wheezing, rhonchi or rales.   Abdominal:      General: Abdomen is flat. There is no distension.      Palpations: There is no mass.      Tenderness: There is abdominal tenderness. There is no right CVA tenderness, left CVA tenderness, guarding or rebound.      Comments: Hypoactive  bowel sounds. RLQ pain with palpation only. LLQ pain at rest and with palpation.    Skin:     General: Skin is warm and dry.      Comments: Left cheek lesion from previous mohs surgery.    Neurological:      Mental Status: She is alert and oriented to person, place, and time    Fluids    Intake/Output Summary (Last 24 hours) at 1/20/2023 0747  Last data filed at 1/19/2023 1351  Gross per 24 hour   Intake 240 ml   Output --   Net 240 ml       Laboratory  Recent Labs     01/18/23  0952 01/19/23  0204   WBC 6.2 5.2   RBC 4.04* 3.83*   HEMOGLOBIN 12.3 11.5*   HEMATOCRIT 37.7 35.6*   MCV 93.3 93.0   MCH 30.4 30.0   MCHC 32.6* 32.3*   RDW 44.1 44.2   PLATELETCT 238 223   MPV 8.1* 8.2*     Recent Labs     01/18/23  0952 01/19/23  0204   SODIUM 141 138   POTASSIUM 3.9 3.7   CHLORIDE 108 108   CO2 22 17*   GLUCOSE 94 72   BUN 23* 15   CREATININE 0.48* 0.35*   CALCIUM 9.3 8.5                   Imaging  CT-ABDOMEN-PELVIS WITH    Result Date: 1/18/2023  1.  There is sigmoid diverticulosis with heterogeneous hypodensities in the wall of the sigmoid colon with colonic stranding. While this could represent changes of diverticulitis, underlying mass is also a possibility based on this appearance. 2.  No other interval changes.    CTA ABDOMEN PELVIS W & W/O POST PROCESS    Result Date: 1/20/2023  1.  Diverticulosis with area of distal sigmoid and rectal colonic wall thickening with adjacent hazy fat stranding likely corresponding with component of diverticulitis. Recommend follow-up CT or colonoscopy following treatment to exclude inflammatory carcinoma which can have similar radiographic appearance. 2.  Subtle enhancing lesion in the right hepatic lobe, recommend follow-up MRI liver for further characterization. 3.  Irregular hepatic contour favoring changes of cirrhosis 4.  Fat-containing right Bochdalek hernia 5.  Fat-containing left inguinal hernia        Assessment/Plan  Problem Representation:    * Left Lower Quadrant Pain -  (present on admission)  Assessment & Plan  Ddx diverticulitis vs mesenteric ischemia (pain is located in watershed area) vs colon cancer. Perforation or abscess less likely as pt   -Mesenteric ischemia possible as pt has hx smoking and chronic pain exacerbated immediately after food ingestion. Pain also located in watershed area. CT 1/18 also noted atherosclerosis.   -CTA A/P noted diverticulosis with area of distal sigmoid and rectal colonic wall thickening with adjacent hazy fat stranding likely corresponding with component of diverticulitis. Subtle enhancing lesion in the right hepatic lobe, irregular hepatic contour favoring changes of cirrhosis, Fat-containing right Bochdalek hernia, and Fat-containing left inguinal hernia  -LLQ sx resolving.   Plan:   -clear liquid diet. Will advance as tolerated.   -Pain meds- tylenol. Will consider adding oxycodone 5mg if not controlled with tylenol (aware that oxycodone can cause constipation however, pain medication options are limited).   -Cipro and Metronidazole for 10 days.   -Consulted GI, appreciate recommendations.      Nausea  Assessment & Plan  Ddx mesenteric ischemia, GERD, gastroparesis vs hiatal hernia.   -nausea and light-headed feeling with food/water intake. No relief with ondansetron or prochlorperazine.   -She reported that she has not been eating. UA notable for ketonuria (consistent with fasting)   -Sx somewhat relieved with GI cocktail indicating upper GI pathology.   Plan:   -clear liquid diet. Will advance as tolerated.   -Continue Mylanta   -PRN GI cocktail   -PRN Reglan   -Hold GI medications and NPO at midnight for gastric emptying study.      Gastroesophageal reflux disease  Assessment & Plan  -Pt reported hx hiatal hernia however on further chart review found CT scan from 2004 that noted small Bochdalek hernia. No hiatal hernia noted.   -Increased Omeprazoled to 40mg BID.      Benign essential HTN- (present on admission)  Assessment & Plan  -BP  elevated on admission   -Continue home carvedilol and lisinopril.      Hypothyroidism  Assessment & Plan  -continue home levothyroxine      Mixed hyperlipidemia- (present on admission)  Assessment & Plan  -continue home atorvastatin      Other specified glaucoma  Assessment & Plan  -continue home latanoprost      VTE prophylaxis: Xarelto 10 mg daily as prophylaxis    I have performed a physical exam and reviewed and updated ROS and Plan today (1/20/2023). In review of yesterday's note (1/19/2023), there are no changes except as documented above.

## 2023-01-20 NOTE — DOCUMENTATION QUERY
[facility]                                                                       Query Response Note      PATIENT:               GAYATHRI ORDONEZ  ACCT #:                  [id number]  MRN:                     [id number]  :                      1945  ADMIT DATE:       2023 9:27 AM  DISCH DATE:          RESPONDING  PROVIDER #:        [id number]           QUERY TEXT:    Based on your medical judgment of the clinical indicators outlined above, please clarify if you are treating this patient for a known or suspected:    NOTE: If an appropriate response is not listed below, please respond with a new note.    Thank you.      The patient's Clinical Indicators include:   RD Note:  Meets criteria for severe chronic malnutrition. PO intake 50% of normal x 1 month and reported weight loss 8.6% in 1 month.   Hospitalist Note: Diverticulitis.    Risk factors: weight loss, decreased PO intake, diverticulitis    Treatment: RD consult and treatment    Thank you,  Luisa Alarcon  Clinical   Connect via [employer]  Options provided:   -- Malnutrition, Severe Protein Calorie   -- Malnutrition, Moderate Protein Calorie   -- Malnutrition, Mild Protein Calorie   -- Other explanation, Please specify   -- Unable to determine      Query created by: Luisa Alarcon on 2023 10:40 AM    RESPONSE TEXT:    Malnutrition, Severe Protein Calorie          Electronically signed by:  STEVEN NANCE MD 2023 2:09 PM

## 2023-01-20 NOTE — CARE PLAN
Problem: Fall Risk  Goal: Patient will remain free from falls  Outcome: Progressing     Problem: Pain - Standard  Goal: Alleviation of pain or a reduction in pain to the patient’s comfort goal  Outcome: Progressing       The patient is Stable - Low risk of patient condition declining or worsening    Shift Goals  Clinical Goals: NPO  Patient Goals: rest    Progress made toward(s) clinical / shift goals:  Patient fall risk interventions in place and pain medications per EMAR.    Patient is not progressing towards the following goals:

## 2023-01-20 NOTE — CARE PLAN
The patient is Stable - Low risk of patient condition declining or worsening    Shift Goals  Clinical Goals: Clear liquids, Patient to remain free from pain  Patient Goals: rest    Progress made toward(s) clinical / shift goals:  Patient Has tolerated clear liquids, patient states she has pain 0/10     Patient is not progressing towards the following goals:

## 2023-01-20 NOTE — PROGRESS NOTES
Daily Progress Note:     Date of Service: 1/19/2023  Primary Team: [facility] IM White Team   Attending: Jorge L Mancuso M.D.   Senior Resident: Dr. Uziel Taylor  Intern: Dr. Mark Vieyra  Contact:  [phone]    Chief Complaint:  LLQ Abdominal pain with nausea    ID:  Maryam Gomez is a 77 y.o. female who presented 1/18/2023 with cc LLQ pain and nausea.    Subjective:  C/o 3 episodes of diarrhea.      Interval History:  GI soft diet.  Will d/c fluids if demonstrates good PO intake.    Consultants/Specialty:      Objective Data:   Physical Exam:   Vitals:   Temp:  [36.2 °C (97.2 °F)-36.8 °C (98.2 °F)] 36.8 °C (98.2 °F)  Pulse:  [64-84] 84  Resp:  [16-18] 16  BP: (118-149)/(66-78) 126/78  SpO2:  [95 %-96 %] 95 %    Physical Exam  Vitals and nursing note reviewed.   Constitutional:       General: She is not in acute distress.     Appearance: Normal appearance. She is not ill-appearing or toxic-appearing.   HENT:      Head: Normocephalic and atraumatic.   Cardiovascular:      Rate and Rhythm: Normal rate and regular rhythm.      Heart sounds: Normal heart sounds. No murmur heard.    No friction rub. No gallop.   Pulmonary:      Effort: Pulmonary effort is normal. No respiratory distress.      Breath sounds: Normal breath sounds. No stridor. No wheezing, rhonchi or rales.   Abdominal:      General: Abdomen is flat. There is no distension.      Palpations: There is no mass.      Tenderness: There is abdominal tenderness. There is no right CVA tenderness, left CVA tenderness, guarding or rebound.      Comments: Hypoactive bowel sounds. RLQ pain with palpation only. LLQ pain at rest and with palpation.    Musculoskeletal:      Right lower leg: No edema.      Left lower leg: No edema.   Skin:     General: Skin is warm and dry.      Coloration: Skin is not pale.      Findings: Lesion present. No bruising.      Comments: Left cheek lesion from previous mohs surgery.    Neurological:      Mental Status: She is alert and  oriented to person, place, and time.       Labs:   Recent Results (from the past 24 hour(s))   CBC without Differential    Collection Time: 01/19/23  2:04 AM   Result Value Ref Range    WBC 5.2 4.8 - 10.8 K/uL    RBC 3.83 (L) 4.20 - 5.40 M/uL    Hemoglobin 11.5 (L) 12.0 - 16.0 g/dL    Hematocrit 35.6 (L) 37.0 - 47.0 %    MCV 93.0 81.4 - 97.8 fL    MCH 30.0 27.0 - 33.0 pg    MCHC 32.3 (L) 33.6 - 35.0 g/dL    RDW 44.2 35.9 - 50.0 fL    Platelet Count 223 164 - 446 K/uL    MPV 8.2 (L) 9.0 - 12.9 fL   Comp Metabolic Panel (CMP)    Collection Time: 01/19/23  2:04 AM   Result Value Ref Range    Sodium 138 135 - 145 mmol/L    Potassium 3.7 3.6 - 5.5 mmol/L    Chloride 108 96 - 112 mmol/L    Co2 17 (L) 20 - 33 mmol/L    Anion Gap 13.0 7.0 - 16.0    Glucose 72 65 - 99 mg/dL    Bun 15 8 - 22 mg/dL    Creatinine 0.35 (L) 0.50 - 1.40 mg/dL    Calcium 8.5 8.5 - 10.5 mg/dL    AST(SGOT) 10 (L) 12 - 45 U/L    ALT(SGPT) 6 2 - 50 U/L    Alkaline Phosphatase 74 30 - 99 U/L    Total Bilirubin 0.4 0.1 - 1.5 mg/dL    Albumin 3.3 3.2 - 4.9 g/dL    Total Protein 6.1 6.0 - 8.2 g/dL    Globulin 2.8 1.9 - 3.5 g/dL    A-G Ratio 1.2 g/dL   CORRECTED CALCIUM    Collection Time: 01/19/23  2:04 AM   Result Value Ref Range    Correct Calcium 9.1 8.5 - 10.5 mg/dL   ESTIMATED GFR    Collection Time: 01/19/23  2:04 AM   Result Value Ref Range    GFR (CKD-EPI) 104 >60 mL/min/1.73 m 2       Imaging:   Independant Imaging Review: Completed  CT-ABDOMEN-PELVIS WITH   Final Result      1.  There is sigmoid diverticulosis with heterogeneous hypodensities in the wall of the sigmoid colon with colonic stranding. While this could represent changes of diverticulitis, underlying mass is also a possibility based on this appearance.   2.  No other interval changes.      CTA ABDOMEN PELVIS W & W/O POST PROCESS    (Results Pending)       Problem Representation:    * Left Lower Quadrant Pain - (present on admission)  Assessment & Plan  Ddx diverticulitis vs mesenteric  ischemia (pain is located in watershed area) vs colon cancer. Perforation or abscess less likely as pt   -Mesenteric ischemia possible as pt has hx smoking and chronic pain exacerbated immediately after food ingestion. Pain also located in watershed area. CT 1/18 also noted atherosclerosis.   Plan:   -CTA to r/o mesenteric ischemia.   -trial of GI soft diet  -Pain meds- tylenol. Will consider adding oxycodone 5mg if not controlled with tylenol (aware that oxycodone can cause constipation however, pain medication options are limited).   -Cipro and Metronidazole for 10 days.   -Consulted GI, appreciate recommendations.      Nausea  Assessment & Plan  Ddx mesenteric ischemia, GERD, vs hiatal hernia.   -nausea and light-headed feeling with food/water intake. No relief with ondansetron. Lightheaded   -She reported that she has not eaten since 4pm yesterday. UA notable for ketonuria (consistent with fasting)   Plan:   -NPO   -Continue Mylanta   -Will try prochlorperazine 10Q6 PRN.       Gastroesophageal reflux disease  Assessment & Plan  -Pt reported hx hiatal hernia however on further chart review found CT scan from 2004 that noted small Bochdalek hernia. No hiatal hernia noted.   -Continue home famotidine and lansoprazole.      Benign essential HTN- (present on admission)  Assessment & Plan  -BP elevated on admission   -Continue home carvedilol and lisinopril.      Hypothyroidism  Assessment & Plan  -continue home levothyroxine      Mixed hyperlipidemia- (present on admission)  Assessment & Plan  -continue home atorvastatin      Other specified glaucoma  Assessment & Plan  -continue home latanoprost       Lines: pIV  Fluids: NS 75 cc/hr  Diet: Soft mechanical  DVT prophylaxis: Rivaroxaban  GI prophylaxis: Omeprazole  Antibiotics: Cipro/flagyl  Code Status: FULL  Disposition: Inpatient

## 2023-01-20 NOTE — PROGRESS NOTES
Pt bP elevated S167 at time of Morning vitals. C/o not feeling well, nausea, and feels SOB. 95% on RA, no signs of distress. Nausea PRN given, vitals rechecked. . 96% RA. [facility] white MD on call paged to report patients complaints.Pt reports she does have anxiety.  No new orders at this time, awaiting CB  from Dr. Hunt.    This RN spoke with Dr Hunt on phone at 0530 , reported all patient symptoms. Confirmed pt VS & that spo2 is 95 RA. BP trending down. Able to take AM meds. Showing no signs of Visible distress. Pt showing no signs of  allergic reaction from  CT with IV contrast from 0023 at this time. No new orders at this time.MD reported to be en route to different call with round on pt next or relay message to day team MD if needed.

## 2023-01-21 ENCOUNTER — APPOINTMENT (OUTPATIENT)
Dept: RADIOLOGY | Facility: MEDICAL CENTER | Age: 78
DRG: 391 | End: 2023-01-21
Payer: MEDICARE

## 2023-01-21 PROCEDURE — 770006 HCHG ROOM/CARE - MED/SURG/GYN SEMI*

## 2023-01-21 PROCEDURE — A9270 NON-COVERED ITEM OR SERVICE: HCPCS

## 2023-01-21 PROCEDURE — 700102 HCHG RX REV CODE 250 W/ 637 OVERRIDE(OP)

## 2023-01-21 PROCEDURE — A9541 TC99M SULFUR COLLOID: HCPCS

## 2023-01-21 PROCEDURE — 99233 SBSQ HOSP IP/OBS HIGH 50: CPT | Mod: GC | Performed by: HOSPITALIST

## 2023-01-21 RX ORDER — OMEPRAZOLE 20 MG/1
20 CAPSULE, DELAYED RELEASE ORAL DAILY
Status: DISCONTINUED | OUTPATIENT
Start: 2023-01-21 | End: 2023-01-22

## 2023-01-21 RX ORDER — OMEPRAZOLE 20 MG/1
40 CAPSULE, DELAYED RELEASE ORAL EVERY 12 HOURS
Status: DISCONTINUED | OUTPATIENT
Start: 2023-01-21 | End: 2023-01-21

## 2023-01-21 RX ORDER — METOCLOPRAMIDE 10 MG/1
10 TABLET ORAL
Status: DISCONTINUED | OUTPATIENT
Start: 2023-01-21 | End: 2023-01-21

## 2023-01-21 RX ORDER — METOCLOPRAMIDE 10 MG/1
10 TABLET ORAL 3 TIMES DAILY
Status: DISCONTINUED | OUTPATIENT
Start: 2023-01-21 | End: 2023-01-22

## 2023-01-21 RX ADMIN — METRONIDAZOLE 500 MG: 500 TABLET ORAL at 15:13

## 2023-01-21 RX ADMIN — CIPROFLOXACIN 500 MG: 500 TABLET, FILM COATED ORAL at 17:21

## 2023-01-21 RX ADMIN — ATORVASTATIN CALCIUM 20 MG: 20 TABLET, FILM COATED ORAL at 17:21

## 2023-01-21 RX ADMIN — RIVAROXABAN 10 MG: 10 TABLET, FILM COATED ORAL at 17:22

## 2023-01-21 RX ADMIN — METRONIDAZOLE 500 MG: 500 TABLET ORAL at 20:36

## 2023-01-21 RX ADMIN — ACETAMINOPHEN 1000 MG: 500 TABLET ORAL at 15:13

## 2023-01-21 RX ADMIN — METOCLOPRAMIDE 10 MG: 10 TABLET ORAL at 17:21

## 2023-01-21 RX ADMIN — LATANOPROST 1 DROP: 50 SOLUTION OPHTHALMIC at 13:00

## 2023-01-21 RX ADMIN — OMEPRAZOLE 20 MG: 20 CAPSULE, DELAYED RELEASE ORAL at 17:21

## 2023-01-21 ASSESSMENT — PAIN DESCRIPTION - PAIN TYPE
TYPE: ACUTE PAIN

## 2023-01-21 NOTE — CARE PLAN
The patient is Stable - Low risk of patient condition declining or worsening    Shift Goals  Clinical Goals: Patient will be free of emesis, pain level <4/10.  Patient Goals: rest/comfort    Progress made toward(s) clinical / shift goals:  progressing  No pain reported at this time.  Instructed on purpose of GI cocktail meds.    Patient is not progressing towards the following goals:

## 2023-01-21 NOTE — PROGRESS NOTES
[facility] Internal Medicine Daily Progress Note    Date of Service  1/21/2023    [facility] Team: R IM White Team   Attending: Jorge L Mancuso M.d.  Senior Resident: Dr. Taylor  Intern:  Dr. Vieyra  Contact Number: [phone]    Chief Complaint  Maryam Gomez is a 77 y.o. female admitted 1/18/2023 with LLQ pain and nausea.     Hospital Course  Maryam Gomez is a 77 y.o. female with h/o of , H pylori gastritis, diverticulosis, haitul hernia, BRBPR 2018 due sigmoid colon ulcer thought ischemic (had colonscopy with biopsy-- indeterminate) , ex 30 packyear tobacco p/w 6 months of nause, weight loss and LLQ pain who presented with 8 month hx of LLQ pain and nausea. Her sx initially started June 2022 at which time she had a CT A/P, hida scan and H pylori test all of which were negative. She established with a GI physician and in November had an appointment where they decided to not pursue having an EGD or Colonoscopy since her sx were somewhat improving at that time. However, in the middle of December her sx returned and she has had constant nausea (without vomiting), lightheadedness, and LLQ pain. Her sx become intolerable so she presented to the ED 1/18.     Interval Problem Update  This morning mrs Gomez said she felt about the same as yesterday. She said she was able to tolerate liquid diet yesterday but was still having nausea. Reported 3 soft (not diarrhea) Bms yesterday. She also noted new onset wheezing w/o sob.   -Gastric emptying study noted delayed emptying with gastric half emptying time of 234 minutes.  -Restarted gastric medications including Reglan   -advanced diet to puree.   -IS     I have discussed this patient's plan of care and discharge plan at IDT rounds today with Case Management, Nursing, Nursing leadership, and other members of the IDT team.    Consultants/Specialty  GI    Code Status  Full Code    Disposition  Patient is not medically cleared for discharge.   Anticipate discharge to to  home with close outpatient follow-up.  I have placed the appropriate orders for post-discharge needs.    Review of Systems  Review of Systems   Constitutional:  Positive for malaise/fatigue and weight loss. Negative for chills and fever.   Respiratory:  Negative for cough, shortness of breath.    Cardiovascular:  Negative for chest pain, palpitations and leg swelling.        Ankle swelling   Gastrointestinal:  Positive for abdominal pain, diarrhea, heartburn and nausea. Negative for blood in stool, constipation, melena and vomiting.   Genitourinary:  Negative for dysuria, frequency and urgency.   Musculoskeletal:  Positive for joint pain. Negative for myalgias.   Neurological:  Negative for dizziness, weakness and headaches.        Lightheadedness       Physical Exam  Temp:  [36.3 °C (97.4 °F)-36.7 °C (98 °F)] 36.4 °C (97.6 °F)  Pulse:  [69-92] 92  Resp:  [16-19] 18  BP: (149-154)/(89-97) 154/97  SpO2:  [90 %-98 %] 98 %    Physical Exam  Vitals and nursing note reviewed.   Constitutional:       General: She is not in acute distress.     Appearance: She is ill-appearing. She is not toxic-appearing.   Cardiovascular:      Rate and Rhythm: Normal rate and regular rhythm.      Heart sounds: Normal heart sounds. No murmur heard.    No friction rub. No gallop.   Pulmonary:      Effort: Pulmonary effort is normal. No respiratory distress.      Breath sounds: Normal breath sounds. No stridor. No rhonchi or rales.      Comment: Mild end-expiratory wheezing all auscultation posts.   Abdominal:      General: Abdomen is flat. There is no distension.      Palpations: There is no mass.      Tenderness: There is abdominal tenderness. There is no right CVA tenderness, left CVA tenderness, guarding or rebound.      Comments: Hypoactive bowel sounds. RLQ pain with palpation only. LLQ pain at rest and with palpation.    Skin:     General: Skin is warm and dry.      Comments: Left cheek lesion from previous mohs surgery.     Neurological:      Mental Status: She is alert and oriented to person, place, and time    Fluids  No intake or output data in the 24 hours ending 01/21/23 1119    Laboratory  Recent Labs     01/19/23  0204   WBC 5.2   RBC 3.83*   HEMOGLOBIN 11.5*   HEMATOCRIT 35.6*   MCV 93.0   MCH 30.0   MCHC 32.3*   RDW 44.2   PLATELETCT 223   MPV 8.2*     Recent Labs     01/19/23  0204   SODIUM 138   POTASSIUM 3.7   CHLORIDE 108   CO2 17*   GLUCOSE 72   BUN 15   CREATININE 0.35*   CALCIUM 8.5                   Imaging  CT-ABDOMEN-PELVIS WITH    Result Date: 1/18/2023  1.  There is sigmoid diverticulosis with heterogeneous hypodensities in the wall of the sigmoid colon with colonic stranding. While this could represent changes of diverticulitis, underlying mass is also a possibility based on this appearance. 2.  No other interval changes.    CTA ABDOMEN PELVIS W & W/O POST PROCESS    Result Date: 1/20/2023  1.  Diverticulosis with area of distal sigmoid and rectal colonic wall thickening with adjacent hazy fat stranding likely corresponding with component of diverticulitis. Recommend follow-up CT or colonoscopy following treatment to exclude inflammatory carcinoma which can have similar radiographic appearance. 2.  Subtle enhancing lesion in the right hepatic lobe, recommend follow-up MRI liver for further characterization. 3.  Irregular hepatic contour favoring changes of cirrhosis 4.  Fat-containing right Bochdalek hernia 5.  Fat-containing left inguinal hernia        Assessment/Plan  Problem Representation:    * Left Lower Quadrant Pain - (present on admission)  Assessment & Plan  Ddx diverticulitis vs colon cancer. Perforation or abscess less likely.   -Mesenteric ischemia was considered as pt has hx smoking and chronic pain exacerbated immediately after food ingestion. Pain also located in watershed area. CT 1/18 also noted atherosclerosis. However, CTA A/P noted diverticulosis with area of distal sigmoid and rectal colonic  wall thickening with adjacent hazy fat stranding likely corresponding with component of diverticulitis. Subtle enhancing lesion in the right hepatic lobe, irregular hepatic contour favoring changes of cirrhosis, Fat-containing right Bochdalek hernia, and Fat-containing left inguinal hernia  -LLQ sx resolving.   Plan:   -clear liquid diet. Will advance as tolerated.   -Pain meds- tylenol. Will consider adding oxycodone 5mg if not controlled with tylenol (aware that oxycodone can cause constipation however, pain medication options are limited).   -Cipro and Metronidazole for 10 days.     Gastroparesis   Assessment & Plan  Ddx idiopathic (most common cause of gastroparesis), viral, neurological, or autoimmune.   -Reviewed pt's medications, none associated with gastroparesis.   -no hx diabetes.   Plan:   -Started pt on Reglan PO before meals and before bed.   -Will likely need further outpt follow up to find cause.      Nausea  Assessment & Plan  Ddx mesenteric ischemia, GERD, gastroparesis vs hiatal hernia.   -nausea and light-headed feeling with food/water intake. No relief with ondansetron or prochlorperazine.   -She reported that she has not been eating. UA notable for ketonuria (consistent with fasting)   -Sx somewhat relieved with GI cocktail indicating upper GI pathology.   Plan:   -Liquidized diet. Will advance as tolerated.   -Continue Mylanta   -PRN GI cocktail      Lesion right hepatic lobe (incidental finding)  Assessment & Plan  -CT noted subtle enhancing lesion in the right hepatic lobe and recommended f/u w/ MRI.   -f/u outpt.     Gastroesophageal reflux disease  Assessment & Plan  -Pt reported hx hiatal hernia however on further chart review found CT scan from 2004 that noted small Bochdalek hernia. No hiatal hernia noted.   -No gastroesophageal reflux seen on gastric emptying study.   Plan:  -Decreased Omeprazole to 20 daily.      Benign essential HTN- (present on admission)  Assessment & Plan  -BP  elevated on admission   -Continue home carvedilol and lisinopril.      Hypothyroidism  Assessment & Plan  -continue home levothyroxine      Mixed hyperlipidemia- (present on admission)  Assessment & Plan  -continue home atorvastatin      Other specified glaucoma  Assessment & Plan  -continue home latanoprost      VTE prophylaxis: Xarelto 10 mg daily as prophylaxis    I have performed a physical exam and reviewed and updated ROS and Plan today (1/21/2023). In review of yesterday's note (1/20/2023), there are no changes except as documented above.

## 2023-01-21 NOTE — PROGRESS NOTES
Received patient resting in bed. No complaint of pain at this time. PIV to right arm patent with dressing intact.  Informed patient of nuclear med study and NPO post midnight except sips with meds, verbalized understanding. Bed in low position, wheels locked, side rails up x2, call light and personal belongings within reach.

## 2023-01-21 NOTE — HOSPITAL COURSE
Maryam Gomez is a 77 y.o. female with h/o of , H pylori gastritis, diverticulosis, haitul hernia, BRBPR 2018 due sigmoid colon ulcer thought ischemic (had colonscopy with biopsy-- indeterminate) , ex 30 packyear tobacco p/w 6 months of nause, weight loss and LLQ pain who presented with 8 month hx of LLQ pain and nausea. Her sx initially started June 2022 at which time she had a CT A/P, hida scan and H pylori test all of which were negative. She established with a GI physician and in November had an appointment where they decided to not pursue having an EGD or Colonoscopy since her sx were somewhat improving at that time. However, in the middle of December her sx returned and she has had constant nausea (without vomiting), lightheadedness, and LLQ pain. Her sx become intolerable so she presented to the ED 1/18. CTA A/P did not show ischemia. Gastric emptying study noted decreased speed of gastric emptying. Patient was started on Reglan and had significant improvement in sx.

## 2023-01-21 NOTE — CARE PLAN
The patient is Stable - Low risk of patient condition declining or worsening    Shift Goals  Clinical Goals: Tolerate clear liquid diet, Patient to remain free from pain  Patient Goals: rest    Progress made toward(s) clinical / shift goals:  Patient had no report or s/s of pain, able to tolerate clear liquid diet    Patient is not progressing towards the following goals:

## 2023-01-21 NOTE — PROGRESS NOTES
Assumed care of patient 0700. Received Report from [facility] nurse. Patient A&O4, on RA, Reporting a pain level of 0. Call light within reach, belongings within reach, Fall precautions in place, and bed alarm is on and bed in lowest position. Patient does not have any other needs at this time.     Discussed patients feeling of nausea, Dr order GI cocktail Q6 PRN. Patient stated feeling better once taking med

## 2023-01-22 PROCEDURE — A9270 NON-COVERED ITEM OR SERVICE: HCPCS

## 2023-01-22 PROCEDURE — 700102 HCHG RX REV CODE 250 W/ 637 OVERRIDE(OP)

## 2023-01-22 PROCEDURE — 99233 SBSQ HOSP IP/OBS HIGH 50: CPT | Mod: GC | Performed by: HOSPITALIST

## 2023-01-22 PROCEDURE — 700102 HCHG RX REV CODE 250 W/ 637 OVERRIDE(OP): Performed by: HOSPITALIST

## 2023-01-22 PROCEDURE — 770006 HCHG ROOM/CARE - MED/SURG/GYN SEMI*

## 2023-01-22 PROCEDURE — A9270 NON-COVERED ITEM OR SERVICE: HCPCS | Performed by: HOSPITALIST

## 2023-01-22 RX ORDER — METOCLOPRAMIDE 5 MG/1
5 TABLET ORAL
Status: DISCONTINUED | OUTPATIENT
Start: 2023-01-22 | End: 2023-01-24

## 2023-01-22 RX ORDER — METOCLOPRAMIDE 10 MG/1
10 TABLET ORAL
Status: DISCONTINUED | OUTPATIENT
Start: 2023-01-22 | End: 2023-01-22

## 2023-01-22 RX ADMIN — METRONIDAZOLE 500 MG: 500 TABLET ORAL at 04:40

## 2023-01-22 RX ADMIN — OMEPRAZOLE 20 MG: 20 CAPSULE, DELAYED RELEASE ORAL at 04:40

## 2023-01-22 RX ADMIN — RIVAROXABAN 10 MG: 10 TABLET, FILM COATED ORAL at 17:55

## 2023-01-22 RX ADMIN — ACETAMINOPHEN 1000 MG: 500 TABLET ORAL at 04:48

## 2023-01-22 RX ADMIN — CARVEDILOL 6.25 MG: 6.25 TABLET, FILM COATED ORAL at 21:03

## 2023-01-22 RX ADMIN — CARVEDILOL 6.25 MG: 6.25 TABLET, FILM COATED ORAL at 10:23

## 2023-01-22 RX ADMIN — METRONIDAZOLE 500 MG: 500 TABLET ORAL at 21:03

## 2023-01-22 RX ADMIN — LISINOPRIL 10 MG: 10 TABLET ORAL at 04:40

## 2023-01-22 RX ADMIN — METOCLOPRAMIDE 5 MG: 5 TABLET ORAL at 10:23

## 2023-01-22 RX ADMIN — ATORVASTATIN CALCIUM 20 MG: 20 TABLET, FILM COATED ORAL at 17:55

## 2023-01-22 RX ADMIN — CIPROFLOXACIN 500 MG: 500 TABLET, FILM COATED ORAL at 04:40

## 2023-01-22 RX ADMIN — METOCLOPRAMIDE 5 MG: 5 TABLET ORAL at 17:57

## 2023-01-22 RX ADMIN — LATANOPROST 1 DROP: 50 SOLUTION OPHTHALMIC at 13:00

## 2023-01-22 RX ADMIN — METRONIDAZOLE 500 MG: 500 TABLET ORAL at 15:14

## 2023-01-22 RX ADMIN — CIPROFLOXACIN 500 MG: 500 TABLET, FILM COATED ORAL at 17:55

## 2023-01-22 ASSESSMENT — ENCOUNTER SYMPTOMS
VOMITING: 0
DIZZINESS: 0
PALPITATIONS: 0
SPUTUM PRODUCTION: 0
SORE THROAT: 0
HEMOPTYSIS: 0
SHORTNESS OF BREATH: 0
BLOOD IN STOOL: 0
HEARTBURN: 0
LOSS OF CONSCIOUSNESS: 0
FEVER: 0
NECK PAIN: 0
WHEEZING: 0
ABDOMINAL PAIN: 0
WEAKNESS: 1
HEADACHES: 0
DIARRHEA: 1
SEIZURES: 0
PND: 0
ORTHOPNEA: 0
COUGH: 0
CHILLS: 0
CONSTIPATION: 0
NAUSEA: 0
BACK PAIN: 0

## 2023-01-22 ASSESSMENT — PAIN DESCRIPTION - PAIN TYPE
TYPE: ACUTE PAIN

## 2023-01-22 NOTE — CARE PLAN
The patient is Stable - Low risk of patient condition declining or worsening    Shift Goals  Clinical Goals: Will be free of emesis, pain level <4/10.  Patient Goals: rest/comfort    Progress made toward(s) clinical / shift goals:  progressing  No complaint of pain.  Patient uses call light for assistance.    Patient is not progressing towards the following goals:

## 2023-01-22 NOTE — PROGRESS NOTES
[facility] Internal Medicine Daily Progress Note    Date of Service  1/22/2023    [facility] Team: R IM White Team   Attending: Jorge L Mancuso M.d.  Senior Resident: Dr. Taylor  Intern:  Dr. Vieyra   Contact Number: [phone]    Chief Complaint  Maryam Gomez is a 77 y.o. female admitted 1/18/2023 with nausea and abdominal pain.     Hospital Course  Maryam Gomez is a 77 y.o. female with h/o of , H pylori gastritis, diverticulosis, haitul hernia, BRBPR 2018 due sigmoid colon ulcer thought ischemic (had colonscopy with biopsy-- indeterminate) , ex 30 packyear tobacco p/w 6 months of nause, weight loss and LLQ pain who presented with 8 month hx of LLQ pain and nausea. Her sx initially started June 2022 at which time she had a CT A/P, hida scan and H pylori test all of which were negative. She established with a GI physician and in November had an appointment where they decided to not pursue having an EGD or Colonoscopy since her sx were somewhat improving at that time. However, in the middle of December her sx returned and she has had constant nausea (without vomiting), lightheadedness, and LLQ pain. Her sx become intolerable so she presented to the ED 1/18.     Interval Problem Update  This morning Mrs Gomez said she felt significantly improved compared to yesterday. She said her nausea was alleviated with Reglan and she was able to eat some of her food last night. She did however report one episode of watery diarrhea last night but this morning her stool was more formed.   -Improvement with Reglan. Continue taking about 30 min before meals.   -Advanced diet to soft bite-sized.   -Continue calorie counting to determine if pt having enough PO intake.     I have discussed this patient's plan of care and discharge plan at IDT rounds today with Case Management, Nursing, Nursing leadership, and other members of the IDT team.    Consultants/Specialty  None    Code Status  Full Code    Disposition  Patient is not  medically cleared for discharge.   Anticipate discharge to to home with close outpatient follow-up.  I have placed the appropriate orders for post-discharge needs.    Review of Systems  Review of Systems   Constitutional:  Positive for malaise/fatigue. Negative for chills and fever.   HENT:  Negative for congestion and sore throat.    Respiratory:  Negative for cough, hemoptysis, sputum production, shortness of breath and wheezing.         Wheezing from 1/21 resolved.    Cardiovascular:  Negative for chest pain, palpitations, orthopnea, leg swelling and PND.   Gastrointestinal:  Positive for diarrhea. Negative for abdominal pain, blood in stool, constipation, heartburn, melena, nausea and vomiting.   Genitourinary:  Negative for dysuria, frequency and urgency.   Musculoskeletal:  Negative for back pain and neck pain.   Neurological:  Positive for weakness. Negative for dizziness, seizures, loss of consciousness and headaches.      Physical Exam  Temp:  [36 °C (96.8 °F)-37.1 °C (98.7 °F)] 36 °C (96.8 °F)  Pulse:  [] 103  Resp:  [17-18] 18  BP: ()/(62-91) 123/86  SpO2:  [91 %-96 %] 94 %    Physical Exam  Vitals and nursing note reviewed.   Constitutional:       General: She is not in acute distress.     Appearance: She is ill-appearing. She is not toxic-appearing.   Cardiovascular:      Rate and Rhythm: Normal rate and regular rhythm.      Heart sounds: Normal heart sounds. No murmur heard.    No friction rub. No gallop.   Pulmonary:      Effort: Pulmonary effort is normal. No respiratory distress.      Breath sounds: Normal breath sounds. No stridor. No rhonchi or rales.      Comment: wheezing from 1/21 resolved.   Abdominal:      General: Abdomen is flat. There is no distension.      Palpations: There is no mass.      Tenderness: There is abdominal tenderness. There is no right CVA tenderness, left CVA tenderness, guarding or rebound.      Comments: Abdomen mild discomfort with palpation at all  quadrants.   Skin:     General: Skin is warm and dry.      Comments: Left cheek lesion from previous mohs surgery.    Neurological:      Mental Status: She is alert and oriented to person, place, and time    Fluids    Intake/Output Summary (Last 24 hours) at 1/22/2023 0935  Last data filed at 1/22/2023 0738  Gross per 24 hour   Intake 110 ml   Output --   Net 110 ml       Laboratory                        Imaging  CT-ABDOMEN-PELVIS WITH    Result Date: 1/18/2023  1.  There is sigmoid diverticulosis with heterogeneous hypodensities in the wall of the sigmoid colon with colonic stranding. While this could represent changes of diverticulitis, underlying mass is also a possibility based on this appearance. 2.  No other interval changes.    CTA ABDOMEN PELVIS W & W/O POST PROCESS    Result Date: 1/20/2023  1.  Diverticulosis with area of distal sigmoid and rectal colonic wall thickening with adjacent hazy fat stranding likely corresponding with component of diverticulitis. Recommend follow-up CT or colonoscopy following treatment to exclude inflammatory carcinoma which can have similar radiographic appearance. 2.  Subtle enhancing lesion in the right hepatic lobe, recommend follow-up MRI liver for further characterization. 3.  Irregular hepatic contour favoring changes of cirrhosis 4.  Fat-containing right Bochdalek hernia 5.  Fat-containing left inguinal hernia    NM-GASTRIC EMPTYING    Addendum Date: 1/21/2023    There is an error in the body section of the report. It should be as follows: FINDINGS: There was no evidence of gastro-esophageal reflux during 90 minutes of continuous observation.  Gastric emptying was DELAYED.  Gastric half emptying time is 234 minutes. Delayed gastric emptying.     Result Date: 1/21/2023  Delayed gastric emptying.         Assessment/Plan  Problem Representation:    * Left Lower Quadrant Pain - (present on admission)  Assessment & Plan  Ddx diverticulitis vs colon cancer. Perforation or  abscess less likely.   -Mesenteric ischemia was considered as pt has hx smoking and chronic pain exacerbated immediately after food ingestion. Pain also located in watershed area. CT 1/18 also noted atherosclerosis. However, CTA A/P noted diverticulosis with area of distal sigmoid and rectal colonic wall thickening with adjacent hazy fat stranding likely corresponding with component of diverticulitis. Subtle enhancing lesion in the right hepatic lobe, irregular hepatic contour favoring changes of cirrhosis, Fat-containing right Bochdalek hernia, and Fat-containing left inguinal hernia  -LLQ sx resolving.   Plan:   -clear liquid diet. Will advance as tolerated.   -Pain meds- tylenol. Will consider adding oxycodone 5mg if not controlled with tylenol (aware that oxycodone can cause constipation however, pain medication options are limited).   -Cipro and Metronidazole for 10 days.      Gastroparesis   Assessment & Plan  Ddx idiopathic (most common cause of gastroparesis), viral, neurological, or autoimmune.   -Reviewed pt's medications, none associated with gastroparesis.   -no hx diabetes.   Plan:   -Continue Mylanta   -PRN GI Cocktail   -Reglan PRN before meals and before bed.   -Will likely need further outpt follow up to find cause.   -Advanced diet to soft and bite-sized.      Lesion right hepatic lobe (incidental finding)  Assessment & Plan  -CT noted subtle enhancing lesion in the right hepatic lobe and recommended f/u w/ MRI.   -f/u outpt.      Gastroesophageal reflux disease  Assessment & Plan  -Pt reported hx hiatal hernia however on further chart review found CT scan from 2004 that noted small Bochdalek hernia. No hiatal hernia noted.   -No gastroesophageal reflux seen on gastric emptying study.   Plan:  -Decreased Omeprazole to 20 daily.      Benign essential HTN- (present on admission)  Assessment & Plan  -BP elevated on admission   -Continue home carvedilol and lisinopril.      Hypothyroidism  Assessment &  Plan  -continue home levothyroxine      Mixed hyperlipidemia- (present on admission)  Assessment & Plan  -continue home atorvastatin      Other specified glaucoma  Assessment & Plan  -continue home latanoprost       VTE prophylaxis: Xarelto 10 mg daily as prophylaxis    I have performed a physical exam and reviewed and updated ROS and Plan today (1/22/2023). In review of yesterday's note (1/21/2023), there are no changes except as documented above.

## 2023-01-22 NOTE — CARE PLAN
The patient is Stable - Low risk of patient condition declining or worsening    Shift Goals  Clinical Goals: (P) Ambulation and comfort.  Patient Goals: (P) Ambulation and conversation.    Progress made toward(s) clinical / shift goals:    Problem: Fall Risk  Goal: Patient will remain free from falls  Outcome: Progressing     Problem: Knowledge Deficit - Standard  Goal: Patient and family/care givers will demonstrate understanding of plan of care, disease process/condition, diagnostic tests and medications  Outcome: Progressing     Problem: Pain - Standard  Goal: Alleviation of pain or a reduction in pain to the patient’s comfort goal  Outcome: Progressing       Patient is not progressing towards the following goals:

## 2023-01-22 NOTE — PROGRESS NOTES
Received patient resting in bed. No complaints of pain at this time. PIV to right upper arm patent with dressing intact. Bed in low position, wheels locked, side rails up x2, call light and personal items within reach.

## 2023-01-22 NOTE — DIETARY
Nutrition services: Day 4 of admit.  Maryam Gomez is a 77 y.o. female with admitting DX of diverticulosis.    Consult received for calorie count.   Clarified consult with Resident via [employer], who wanted to assess pt's PO intake.   RD met with pt at bedside 1/19 for wt loss and poor PO intake.   Pt requested [employer] smoothies TID, which will add additional kcals and protein to bolster nutrition.   Pt is currently on a L6/L0 diet. PO intake has been highly variable (0-75%).   RD will continue to follow.

## 2023-01-23 ENCOUNTER — APPOINTMENT (OUTPATIENT)
Dept: RADIOLOGY | Facility: MEDICAL CENTER | Age: 78
DRG: 391 | End: 2023-01-23
Payer: MEDICARE

## 2023-01-23 PROCEDURE — 99233 SBSQ HOSP IP/OBS HIGH 50: CPT | Mod: GC | Performed by: HOSPITALIST

## 2023-01-23 PROCEDURE — A9270 NON-COVERED ITEM OR SERVICE: HCPCS | Performed by: HOSPITALIST

## 2023-01-23 PROCEDURE — 770006 HCHG ROOM/CARE - MED/SURG/GYN SEMI*

## 2023-01-23 PROCEDURE — 700102 HCHG RX REV CODE 250 W/ 637 OVERRIDE(OP)

## 2023-01-23 PROCEDURE — 74177 CT ABD & PELVIS W/CONTRAST: CPT

## 2023-01-23 PROCEDURE — 700117 HCHG RX CONTRAST REV CODE 255

## 2023-01-23 PROCEDURE — 700102 HCHG RX REV CODE 250 W/ 637 OVERRIDE(OP): Performed by: HOSPITALIST

## 2023-01-23 PROCEDURE — A9270 NON-COVERED ITEM OR SERVICE: HCPCS

## 2023-01-23 RX ORDER — METOCLOPRAMIDE 5 MG/1
5 TABLET ORAL
Qty: 120 TABLET | Refills: 0 | Status: CANCELLED | OUTPATIENT
Start: 2023-01-23 | End: 2023-02-13

## 2023-01-23 RX ADMIN — ATORVASTATIN CALCIUM 20 MG: 20 TABLET, FILM COATED ORAL at 17:44

## 2023-01-23 RX ADMIN — CIPROFLOXACIN 500 MG: 500 TABLET, FILM COATED ORAL at 17:44

## 2023-01-23 RX ADMIN — CARVEDILOL 6.25 MG: 6.25 TABLET, FILM COATED ORAL at 08:26

## 2023-01-23 RX ADMIN — LIDOCAINE HYDROCHLORIDE 15 ML: 20 SOLUTION OROPHARYNGEAL at 02:05

## 2023-01-23 RX ADMIN — METRONIDAZOLE 500 MG: 500 TABLET ORAL at 21:01

## 2023-01-23 RX ADMIN — CARVEDILOL 6.25 MG: 6.25 TABLET, FILM COATED ORAL at 17:44

## 2023-01-23 RX ADMIN — ACETAMINOPHEN 1000 MG: 500 TABLET ORAL at 18:03

## 2023-01-23 RX ADMIN — METRONIDAZOLE 500 MG: 500 TABLET ORAL at 15:35

## 2023-01-23 RX ADMIN — LEVOTHYROXINE SODIUM 100 MCG: 0.1 TABLET ORAL at 04:34

## 2023-01-23 RX ADMIN — METRONIDAZOLE 500 MG: 500 TABLET ORAL at 04:34

## 2023-01-23 RX ADMIN — CIPROFLOXACIN 500 MG: 500 TABLET, FILM COATED ORAL at 04:34

## 2023-01-23 RX ADMIN — LIDOCAINE HYDROCHLORIDE 15 ML: 20 SOLUTION OROPHARYNGEAL at 15:35

## 2023-01-23 RX ADMIN — METOCLOPRAMIDE 5 MG: 5 TABLET ORAL at 08:26

## 2023-01-23 RX ADMIN — IOHEXOL 100 ML: 350 INJECTION, SOLUTION INTRAVENOUS at 19:20

## 2023-01-23 RX ADMIN — LATANOPROST 1 DROP: 50 SOLUTION OPHTHALMIC at 15:36

## 2023-01-23 RX ADMIN — RIVAROXABAN 10 MG: 10 TABLET, FILM COATED ORAL at 17:44

## 2023-01-23 RX ADMIN — ACETAMINOPHEN 1000 MG: 500 TABLET ORAL at 08:26

## 2023-01-23 ASSESSMENT — ENCOUNTER SYMPTOMS
SHORTNESS OF BREATH: 0
WHEEZING: 0
SEIZURES: 0
NECK PAIN: 1
SPUTUM PRODUCTION: 0
WEAKNESS: 1
MYALGIAS: 0
FEVER: 0
VOMITING: 0
CHILLS: 0
LOSS OF CONSCIOUSNESS: 0
FALLS: 0
CONSTIPATION: 0
DIZZINESS: 0
PALPITATIONS: 0
HEARTBURN: 0
HEADACHES: 0
DIARRHEA: 0
BLOOD IN STOOL: 0
PND: 0
BACK PAIN: 1
ABDOMINAL PAIN: 1
NAUSEA: 1
COUGH: 0
HEMOPTYSIS: 0

## 2023-01-23 ASSESSMENT — PAIN DESCRIPTION - PAIN TYPE
TYPE: ACUTE PAIN
TYPE: ACUTE PAIN

## 2023-01-23 NOTE — PROGRESS NOTES
Received patient resting in bed sleeping but easily arousable.  No complaints of pain at this time. PIV to right arm patent with dressing intact. Bed in low position, wheels locked, side rails up x2, call light and personal items within reach.

## 2023-01-23 NOTE — PROGRESS NOTES
[facility] Internal Medicine Daily Progress Note    Date of Service  1/23/2023    [facility] Team: R IM White Team   Attending: Jorge L Mancuso M.d.  Senior Resident: Dr. Taylor  Intern:  Dr. Vieyra   Contact Number: [phone]    Chief Complaint  Maryam Gomez is a 77 y.o. female admitted 1/18/2023 with nausea and abdominal pain.     Hospital Course  Maryam Gomez is a 77 y.o. female with h/o of , H pylori gastritis, diverticulosis, haitul hernia, BRBPR 2018 due sigmoid colon ulcer thought ischemic (had colonscopy with biopsy-- indeterminate) , ex 30 packyear tobacco p/w 6 months of nause, weight loss and LLQ pain who presented with 8 month hx of LLQ pain and nausea. Her sx initially started June 2022 at which time she had a CT A/P, hida scan and H pylori test all of which were negative. She established with a GI physician and in November had an appointment where they decided to not pursue having an EGD or Colonoscopy since her sx were somewhat improving at that time. However, in the middle of December her sx returned and she has had constant nausea (without vomiting), lightheadedness, and LLQ pain. Her sx become intolerable so she presented to the ED 1/18. CTA A/P did not show ischemia. Gastric emptying study noted decreased speed of gastric emptying. Patient was started on Reglan and had significant improvement in sx.     Interval Problem Update  Return of LLQ pain in combination with epigastric and LUQ pain. She said her nausea hit her at around 1:30 am and has not stopped. She has felt like she needed to throw up but hasn't. She also said she feels weak and aches all over, especially her back, neck, and knees. Diarrhea resolved.   -Returned diet to clear liquid only.   -Ordered CT abdomen   -If no significant finding on CT, plan to DC pt once adequate PO intake.     I have discussed this patient's plan of care and discharge plan at IDT rounds today with Case Management, Nursing, Nursing leadership, and other  members of the IDT team.    Consultants/Specialty  none    Code Status  Full Code    Disposition  Patient is not medically cleared for discharge.   Anticipate discharge to to home with close outpatient follow-up.  I have placed the appropriate orders for post-discharge needs.    Review of Systems  Review of Systems   Constitutional:  Positive for malaise/fatigue. Negative for chills and fever.   Respiratory:  Negative for cough, hemoptysis, sputum production, shortness of breath and wheezing.    Cardiovascular:  Negative for chest pain, palpitations, leg swelling and PND.   Gastrointestinal:  Positive for abdominal pain and nausea. Negative for blood in stool, constipation, diarrhea, heartburn, melena and vomiting.   Genitourinary:  Negative for dysuria, frequency and urgency.   Musculoskeletal:  Positive for back pain, joint pain and neck pain. Negative for falls and myalgias.   Skin:  Negative for itching and rash.   Neurological:  Positive for weakness. Negative for dizziness, seizures, loss of consciousness and headaches.      Physical Exam  Temp:  [36.2 °C (97.1 °F)-37.3 °C (99.2 °F)] 36.2 °C (97.1 °F)  Pulse:  [] 110  Resp:  [17-18] 17  BP: (126-146)/(75-96) 146/96  SpO2:  [92 %-95 %] 92 %    Physical Exam  Vitals and nursing note reviewed.   Constitutional:       General: She is not in acute distress.     Appearance: She is ill-appearing. She is not toxic-appearing.   HENT:      Head: Normocephalic and atraumatic.      Mouth/Throat:      Mouth: Mucous membranes are dry.      Pharynx: Oropharynx is clear. No oropharyngeal exudate or posterior oropharyngeal erythema.   Cardiovascular:      Rate and Rhythm: Regular rhythm. Tachycardia present.      Pulses: Normal pulses.      Heart sounds: No murmur heard.    No friction rub. No gallop.   Pulmonary:      Effort: Pulmonary effort is normal. No respiratory distress.      Breath sounds: Normal breath sounds. No stridor. No wheezing, rhonchi or rales.    Abdominal:      General: There is distension.      Tenderness: There is abdominal tenderness. There is guarding. There is no rebound.      Comments: Most tender at LLQ, LUQ, and epigastric areas.    Musculoskeletal:         General: No swelling, tenderness or deformity.      Right lower leg: No edema.      Left lower leg: No edema.   Skin:     General: Skin is warm and dry.      Capillary Refill: Capillary refill takes less than 2 seconds.      Coloration: Skin is not jaundiced.      Findings: No bruising or lesion.   Neurological:      Mental Status: She is alert and oriented to person, place, and time.      Motor: No weakness.       Fluids    Intake/Output Summary (Last 24 hours) at 1/23/2023 0810  Last data filed at 1/22/2023 1300  Gross per 24 hour   Intake 220 ml   Output --   Net 220 ml       Laboratory                        Imaging  CT-ABDOMEN-PELVIS WITH    Result Date: 1/18/2023  1.  There is sigmoid diverticulosis with heterogeneous hypodensities in the wall of the sigmoid colon with colonic stranding. While this could represent changes of diverticulitis, underlying mass is also a possibility based on this appearance. 2.  No other interval changes.    CTA ABDOMEN PELVIS W & W/O POST PROCESS    Result Date: 1/20/2023  1.  Diverticulosis with area of distal sigmoid and rectal colonic wall thickening with adjacent hazy fat stranding likely corresponding with component of diverticulitis. Recommend follow-up CT or colonoscopy following treatment to exclude inflammatory carcinoma which can have similar radiographic appearance. 2.  Subtle enhancing lesion in the right hepatic lobe, recommend follow-up MRI liver for further characterization. 3.  Irregular hepatic contour favoring changes of cirrhosis 4.  Fat-containing right Bochdalek hernia 5.  Fat-containing left inguinal hernia    NM-GASTRIC EMPTYING    Addendum Date: 1/21/2023    There is an error in the body section of the report. It should be as follows:  FINDINGS: There was no evidence of gastro-esophageal reflux during 90 minutes of continuous observation.  Gastric emptying was DELAYED.  Gastric half emptying time is 234 minutes. Delayed gastric emptying.     Result Date: 1/21/2023  Delayed gastric emptying.         Assessment/Plan  Problem Representation:    * Left Lower Quadrant Pain - (present on admission)  Assessment & Plan  Ddx diverticulitis vs colon cancer. Perforation or abscess less likely.   -Mesenteric ischemia was considered as pt has hx smoking and chronic pain exacerbated immediately after food ingestion. Pain also located in watershed area. CT 1/18 also noted atherosclerosis. However, CTA A/P noted diverticulosis with area of distal sigmoid and rectal colonic wall thickening with adjacent hazy fat stranding likely corresponding with component of diverticulitis. Subtle enhancing lesion in the right hepatic lobe, irregular hepatic contour favoring changes of cirrhosis, Fat-containing right Bochdalek hernia, and Fat-containing left inguinal hernia  -LLQ pain returned 1/23.   Plan:  -Pain meds- tylenol. Will consider adding oxycodone 5mg if not controlled with tylenol (aware that oxycodone can cause constipation however, pain medication options are limited).   -Cipro and Metronidazole for 10 days.   -Returned to liquid diet   -repeat CT Abd      Gastroparesis   Assessment & Plan  Ddx idiopathic (most common cause of gastroparesis), viral, neurological, or autoimmune.   -Reviewed pt's medications, none associated with gastroparesis.   -no hx diabetes.   Plan:   -Continue Mylanta   -PRN GI Cocktail   -Reglan PRN before meals and before bed.   -Will likely need further outpt follow up to find cause.   -changed diet back to liquid diet.      Lesion right hepatic lobe (incidental finding)  Assessment & Plan  -CT noted subtle enhancing lesion in the right hepatic lobe and recommended f/u w/ MRI.   -f/u outpt.      Gastroesophageal reflux disease  Assessment &  Plan  -Pt reported hx hiatal hernia however on further chart review found CT scan from 2004 that noted small Bochdalek hernia. No hiatal hernia noted.   -No gastroesophageal reflux seen on gastric emptying study.   Plan:  -Omeprazole to 20 daily.      Benign essential HTN- (present on admission)  Assessment & Plan  -BP elevated on admission   -Continue home carvedilol and lisinopril.      Hypothyroidism  Assessment & Plan  -continue home levothyroxine      Mixed hyperlipidemia- (present on admission)  Assessment & Plan  -continue home atorvastatin      Other specified glaucoma  Assessment & Plan  -continue home latanoprost       VTE prophylaxis: Xarelto 10 mg daily as prophylaxis    I have performed a physical exam and reviewed and updated ROS and Plan today (1/23/2023). In review of yesterday's note (1/22/2023), there are no changes except as documented above.

## 2023-01-23 NOTE — DISCHARGE SUMMARY
[facility] Internal Medicine Discharge Summary    Attending: Jorge L Mancuso MD  Senior Resident: Dr. Uziel Taylor  Intern:  Dr. Mark Vieyra  Contact Number: [phone]    CHIEF COMPLAINT ON ADMISSION  Chief Complaint   Patient presents with    Abdominal Pain     Pt presents with persistent nausea since June and LLQ pain intermittently without relief from pepcid, zofran and prevacid. Pt states she is nauseous before and after eating. Pt has had CT's done in the past. Pt states she needs an endoscopy and has a GI appointment in three weeks but does not think she can wait that long.        Reason for Admission  ems     Admission Date  1/18/2023    Discharge Date  1/23/2023    CODE STATUS  FULL    HPI & HOSPITAL COURSE    Maryam Gomez is a 77 y.o. female with h/o of , H pylori gastritis, diverticulosis, haitul hernia, BRBPR 2018 due sigmoid colon ulcer thought ischemic (had colonscopy with biopsy-- indeterminate) , ex 30 pack-year tobacco who presented on 1/18/2023 with LLQ abdominal pain worse after eating with nausea and weight loss.  Patient h/o similar symptoms back in July which occurred intermittently until it resolution in September. Patient established with a GI physician and in November had an appointment where they decided to not pursue having an EGD or Colonoscopy since her sx were somewhat improving at that time. However, in December her sx returned and have persisted intermittently ever since.  Cisco presented on 1/18/2023 because she could no longer tolerate the pain.  Cisco had CT Abd and pelvis which confirmed acute diverticulitis which was medically managed with a planned 10-day course of cipro and flagyl.  Consulted GI who recommended outpatient GI follow up for further workup and no inpatient colonoscopy until 6 weeks given the clinical suspicion of diverticulitis.  Gastric emptying study was performed suggesting idiopathic gastroparesis which was managed with GI cocktail and reglan.  Patient  was monitored and diet was slowly advanced.  Patient could not tolerate foods other than jello and mashed potatoes.  Patient was feeling better and stable on day of discharge.  Plan for discharge with follow up with GI at [facility] for further evaluation.        * Acute Diverticulitis - (present on admission)  Assessment & Plan  *Improving Mesenteric ischemia was considered as pt has hx smoking and chronic pain exacerbated immediately after food ingestion. Pain also located in watershed area. CT 1/18 also noted atherosclerosis. However, CTA A/P noted diverticulosis with area of distal sigmoid and rectal colonic wall thickening with adjacent hazy fat stranding likely corresponding with component of diverticulitis. Subtle enhancing lesion in the right hepatic lobe, irregular hepatic contour favoring changes of cirrhosis, -  - continue cipro/flagyl until 1/28  - keep appt with GI     Gastroparesis   Assessment & Plan  *likely idiopathic.   -PRN GI Cocktail   -Reglan PRN before meals and before bed.   -Advanced diet to soft and bite-sized.   -keep appt with GI      Lesion right hepatic lobe (incidental finding)  Assessment & Plan  *CT noted subtle enhancing lesion in the right hepatic lobe and recommended f/u w/ MRI.   -f/u outpt GI     Gastroesophageal reflux disease  History of hiatal hernia  Assessment & Plan  *Pt reported hx hiatal hernia however on further chart review found CT scan from 2004 that noted small Bochdalek hernia. No hiatal hernia noted.  No gastroesophageal reflux seen on gastric emptying study.   - continue home lansoprazole     Benign essential HTN- (present on admission)  Assessment & Plan  -Continue home carvedilol   - d/rich home lisinopril.      Hypothyroidism  Assessment & Plan  -continue home levothyroxine      Mixed hyperlipidemia- (present on admission)  Assessment & Plan  -continue home atorvastatin      Other specified glaucoma  Assessment & Plan  -continue home latanoprost        Therefore, she  is discharged in fair and stable condition to home with close outpatient follow-up.    The patient met 2-midnight criteria for an inpatient stay at the time of discharge.      Physical Exam on Day of Discharge  Vitals and nursing note reviewed.   Constitutional:       General: She is not in acute distress.     Appearance: She is ill-appearing. She is not toxic-appearing.   Cardiovascular:      Rate and Rhythm: Regular rhythm. Tachycardia present.      Pulses: Normal pulses.      Heart sounds: No murmur heard.    No friction rub. No gallop.   Pulmonary:      Effort: Pulmonary effort is normal. No respiratory distress.      Breath sounds: Normal breath sounds. No stridor. No rhonchi or rales.     Comment: Mild end-expiratory wheezing all auscultation posts.   Abdominal:      General: There is distension.      Tenderness: There is abdominal tenderness. There is guarding. There is no rebound.      Comments: Most tender at LLQ. LUQ, and epigastric tenderness improved compared to previous.   Musculoskeletal:         General: No swelling, tenderness or deformity.      Right lower leg: No edema.      Left lower leg: No edema.   Neurological:      Mental Status: She is alert and oriented to person, place, and time.      Motor: No weakness.    FOLLOW UP ITEMS POST DISCHARGE      DISCHARGE DIAGNOSES  Principal Problem:    Acute diverticulitis POA: Yes  Active Problems:    Mixed hyperlipidemia POA: Yes    Benign essential HTN POA: Yes    Gastroesophageal reflux disease POA: Unknown    Hypothyroidism POA: Unknown    Other specified glaucoma POA: Unknown    Nausea POA: Unknown    History of Hiatal hernia POA: Unknown    Nondiabetic gastroparesis POA: Unknown  Resolved Problems:    * No resolved hospital problems. *      FOLLOW UP  No future appointments.  PCP    Schedule an appointment as soon as possible for a visit  As soon as possible      MEDICATIONS ON DISCHARGE     Medication List        START taking these medications         Instructions   ciprofloxacin 500 MG Tabs  Commonly known as: CIPRO   Take 1 Tablet by mouth every 12 hours for 3 days.  Dose: 500 mg     GI Cocktail (hyoscyamine-lidocaine-Maalox)   Doctor's comments: Ingredients: 34 mL hyoscyamine 0.125 mg/5 mL, 126 mL Maalox, and 40 mL viscous lidocaine 2%.  Take 15 mL by mouth every 6 hours as needed (nausea) for up to 21 days.  Dose: 15 mL     metoclopramide 5 MG tablet  Commonly known as: REGLAN   Take 1 Tablet by mouth 4 Times a Day,Before Meals and at Bedtime for 30 days.  Dose: 5 mg     metroNIDAZOLE 500 MG Tabs  Commonly known as: FLAGYL   Take 1 Tablet by mouth every 8 hours for 3 days.  Dose: 500 mg            CONTINUE taking these medications        Instructions   atorvastatin 20 MG Tabs  Commonly known as: LIPITOR   Take 1 Tab by mouth every evening.  Dose: 20 mg     carvedilol 6.25 MG Tabs  Commonly known as: COREG   Take 6.25 mg by mouth 2 times a day.  Dose: 6.25 mg     lansoprazole 30 MG Cpdr  Commonly known as: PREVACID   Take 30 mg by mouth every day.  Dose: 30 mg     latanoprost 0.005 % Soln  Commonly known as: XALATAN   Administer 1 Drop into both eyes 1/2 hour after lunch.  Dose: 1 Drop     levothyroxine 100 MCG Tabs  Commonly known as: SYNTHROID   Take 100 mcg by mouth see administration instructions. Monday, Tuesday, Wednesday, Thursday, Friday ONLY  Dose: 100 mcg     Vitamin D3 50 MCG (2000 UT) Tabs   Take 2,000 Units by mouth every morning.  Dose: 2,000 Units            STOP taking these medications      aspirin EC 81 MG Tbec  Commonly known as: ECOTRIN     famotidine 20 MG Tabs  Commonly known as: PEPCID     lisinopril 10 MG Tabs  Commonly known as: PRINIVIL     mag hydrox-al hydrox-simeth 400-400-40 MG/5ML Susp  Commonly known as: MAALOX PLUS ES or MYLANTA DS              Allergies  No Known Allergies    DIET  No orders of the defined types were placed in this encounter.      ACTIVITY  As tolerated.  Weight bearing as  tolerated    CONSULTATIONS      PROCEDURES      LABORATORY  Lab Results   Component Value Date    SODIUM 138 01/24/2023    POTASSIUM 3.8 01/24/2023    CHLORIDE 103 01/24/2023    CO2 24 01/24/2023    GLUCOSE 92 01/24/2023    BUN 17 01/24/2023    CREATININE 0.65 01/24/2023        Lab Results   Component Value Date    WBC 5.2 01/19/2023    HEMOGLOBIN 12.1 01/24/2023    HEMATOCRIT 37.1 01/24/2023    PLATELETCT 223 01/19/2023        Total time of the discharge process lasted a time duration of 60 minutes.

## 2023-01-23 NOTE — CARE PLAN
The patient is Stable - Low risk of patient condition declining or worsening    Shift Goals  Clinical Goals: Pain level will be <4/10.  Patient Goals: Rest/comfort    Progress made toward(s) clinical / shift goals:  progressing  Pain controlled with current pain regimen  Patient has been free of falls with improvement in mobility.    Patient is not progressing towards the following goals:

## 2023-01-24 LAB
ANION GAP SERPL CALC-SCNC: 11 MMOL/L (ref 7–16)
BUN SERPL-MCNC: 17 MG/DL (ref 8–22)
CALCIUM SERPL-MCNC: 8.4 MG/DL (ref 8.5–10.5)
CHLORIDE SERPL-SCNC: 103 MMOL/L (ref 96–112)
CO2 SERPL-SCNC: 24 MMOL/L (ref 20–33)
CREAT SERPL-MCNC: 0.65 MG/DL (ref 0.5–1.4)
GFR SERPLBLD CREATININE-BSD FMLA CKD-EPI: 89 ML/MIN/1.73 M 2
GLUCOSE SERPL-MCNC: 92 MG/DL (ref 65–99)
HCT VFR BLD AUTO: 37.1 % (ref 37–47)
HGB BLD-MCNC: 12.1 G/DL (ref 12–16)
LACTATE SERPL-SCNC: 1.4 MMOL/L (ref 0.5–2)
POTASSIUM SERPL-SCNC: 3.8 MMOL/L (ref 3.6–5.5)
SODIUM SERPL-SCNC: 138 MMOL/L (ref 135–145)

## 2023-01-24 PROCEDURE — 99233 SBSQ HOSP IP/OBS HIGH 50: CPT | Mod: GC | Performed by: HOSPITALIST

## 2023-01-24 PROCEDURE — A9270 NON-COVERED ITEM OR SERVICE: HCPCS

## 2023-01-24 PROCEDURE — 85018 HEMOGLOBIN: CPT

## 2023-01-24 PROCEDURE — 80048 BASIC METABOLIC PNL TOTAL CA: CPT

## 2023-01-24 PROCEDURE — 85014 HEMATOCRIT: CPT

## 2023-01-24 PROCEDURE — 700102 HCHG RX REV CODE 250 W/ 637 OVERRIDE(OP)

## 2023-01-24 PROCEDURE — 700105 HCHG RX REV CODE 258: Performed by: STUDENT IN AN ORGANIZED HEALTH CARE EDUCATION/TRAINING PROGRAM

## 2023-01-24 PROCEDURE — 770006 HCHG ROOM/CARE - MED/SURG/GYN SEMI*

## 2023-01-24 PROCEDURE — 83605 ASSAY OF LACTIC ACID: CPT

## 2023-01-24 RX ORDER — SODIUM CHLORIDE, SODIUM LACTATE, POTASSIUM CHLORIDE, AND CALCIUM CHLORIDE .6; .31; .03; .02 G/100ML; G/100ML; G/100ML; G/100ML
1000 INJECTION, SOLUTION INTRAVENOUS ONCE
Status: COMPLETED | OUTPATIENT
Start: 2023-01-24 | End: 2023-01-24

## 2023-01-24 RX ORDER — METOCLOPRAMIDE 10 MG/1
5 TABLET ORAL
Status: DISCONTINUED | OUTPATIENT
Start: 2023-01-24 | End: 2023-01-25 | Stop reason: HOSPADM

## 2023-01-24 RX ADMIN — LATANOPROST 1 DROP: 50 SOLUTION OPHTHALMIC at 13:54

## 2023-01-24 RX ADMIN — CIPROFLOXACIN 500 MG: 500 TABLET, FILM COATED ORAL at 04:58

## 2023-01-24 RX ADMIN — CARVEDILOL 6.25 MG: 6.25 TABLET, FILM COATED ORAL at 18:19

## 2023-01-24 RX ADMIN — ATORVASTATIN CALCIUM 20 MG: 20 TABLET, FILM COATED ORAL at 17:04

## 2023-01-24 RX ADMIN — LIDOCAINE HYDROCHLORIDE 15 ML: 20 SOLUTION OROPHARYNGEAL at 00:30

## 2023-01-24 RX ADMIN — CIPROFLOXACIN 500 MG: 500 TABLET, FILM COATED ORAL at 17:03

## 2023-01-24 RX ADMIN — METRONIDAZOLE 500 MG: 500 TABLET ORAL at 14:00

## 2023-01-24 RX ADMIN — METOCLOPRAMIDE 5 MG: 10 TABLET ORAL at 17:04

## 2023-01-24 RX ADMIN — LIDOCAINE HYDROCHLORIDE 15 ML: 20 SOLUTION OROPHARYNGEAL at 18:16

## 2023-01-24 RX ADMIN — CARVEDILOL 6.25 MG: 6.25 TABLET, FILM COATED ORAL at 09:31

## 2023-01-24 RX ADMIN — METRONIDAZOLE 500 MG: 500 TABLET ORAL at 04:57

## 2023-01-24 RX ADMIN — ACETAMINOPHEN 1000 MG: 500 TABLET ORAL at 02:12

## 2023-01-24 RX ADMIN — SODIUM CHLORIDE, POTASSIUM CHLORIDE, SODIUM LACTATE AND CALCIUM CHLORIDE 1000 ML: 600; 310; 30; 20 INJECTION, SOLUTION INTRAVENOUS at 20:53

## 2023-01-24 RX ADMIN — RIVAROXABAN 10 MG: 10 TABLET, FILM COATED ORAL at 17:04

## 2023-01-24 RX ADMIN — METRONIDAZOLE 500 MG: 500 TABLET ORAL at 20:48

## 2023-01-24 RX ADMIN — LEVOTHYROXINE SODIUM 100 MCG: 0.1 TABLET ORAL at 04:58

## 2023-01-24 RX ADMIN — METOCLOPRAMIDE 5 MG: 10 TABLET ORAL at 12:34

## 2023-01-24 RX ADMIN — LIDOCAINE HYDROCHLORIDE 15 ML: 20 SOLUTION OROPHARYNGEAL at 12:34

## 2023-01-24 RX ADMIN — ACETAMINOPHEN 1000 MG: 500 TABLET ORAL at 17:03

## 2023-01-24 ASSESSMENT — PAIN DESCRIPTION - PAIN TYPE
TYPE: ACUTE PAIN

## 2023-01-24 NOTE — PROGRESS NOTES
[facility] Internal Medicine Daily Progress Note    Date of Service  1/24/2023    [facility] Team: R IM White Team   Attending: Jorge L Mancuso M.d.  Senior Resident: Dr. Taylor  Intern:  Dr. Vieyra   Contact Number: [phone]    Chief Complaint  Maryam Gomez is a 77 y.o. female admitted 1/18/2023 with nausea and abdominal pain.     Hospital Course  Maryam Gomez is a 77 y.o. female with h/o of , H pylori gastritis, diverticulosis, haitul hernia, BRBPR 2018 due sigmoid colon ulcer thought ischemic (had colonscopy with biopsy-- indeterminate) , ex 30 packyear tobacco p/w 6 months of nause, weight loss and LLQ pain who presented with 8 month hx of LLQ pain and nausea. Her sx initially started June 2022 at which time she had a CT A/P, hida scan and H pylori test all of which were negative. She established with a GI physician and in November had an appointment where they decided to not pursue having an EGD or Colonoscopy since her sx were somewhat improving at that time. However, in the middle of December her sx returned and she has had constant nausea (without vomiting), lightheadedness, and LLQ pain. Her sx become intolerable so she presented to the ED 1/18. CTA A/P did not show ischemia. Gastric emptying study noted decreased speed of gastric emptying. Patient was started on Reglan and had significant improvement in sx.     Interval Problem Update  This morning Mrs Gomez said she was able to eat a little bit of her liquid diet but that she had continued LLQ pain. Diarrhea has completely resolved.   -CT abdomen resulted- no significant changes noted.   -Patient still has poor PO intake however liquid diet causing significantly less pain or nausea with eating. Will plan for DC tomorrow if pt tolerating liquid meals.    -Changed Reglan to scheduled with meals and before bed.     I have discussed this patient's plan of care and discharge plan at IDT rounds today with Case Management, Nursing, Nursing  leadership, and other members of the IDT team.    Consultants/Specialty  GI    Code Status  Full Code    Disposition  Patient is not medically cleared for discharge.   Anticipate discharge to to home with close outpatient follow-up.  I have placed the appropriate orders for post-discharge needs.    Review of Systems  Review of Systems   Constitutional:  Positive for malaise/fatigue. Negative for chills and fever.   Respiratory:  Negative for cough, hemoptysis, sputum production, shortness of breath and wheezing.    Cardiovascular:  Negative for chest pain, palpitations, leg swelling and PND.   Gastrointestinal:  Positive for abdominal pain and nausea. Negative for blood in stool, constipation, diarrhea, heartburn, melena and vomiting.   Genitourinary:  Negative for dysuria, frequency and urgency.   Musculoskeletal:  Positive for back pain, joint pain and neck pain. Negative for falls and myalgias.   Skin:  Negative for itching and rash.   Neurological:  Positive for weakness. Negative for dizziness, seizures, loss of consciousness and headaches.       Physical Exam  Temp:  [36.2 °C (97.2 °F)-37.1 °C (98.7 °F)] 36.7 °C (98 °F)  Pulse:  [] 98  Resp:  [14-20] 14  BP: (103-150)/(73-96) 103/73  SpO2:  [91 %-94 %] 92 %    Physical Exam  Vitals and nursing note reviewed.   Constitutional:       General: She is not in acute distress.     Appearance: She is ill-appearing. She is not toxic-appearing.   Cardiovascular:      Rate and Rhythm: Regular rhythm. Tachycardia present.      Pulses: Normal pulses.      Heart sounds: No murmur heard.    No friction rub. No gallop.   Pulmonary:      Effort: Pulmonary effort is normal. No respiratory distress.      Breath sounds: Normal breath sounds. No stridor. No rhonchi or rales.     Comment: Mild end-expiratory wheezing all auscultation posts.   Abdominal:      General: There is distension.      Tenderness: There is abdominal tenderness. There is guarding. There is no rebound.       Comments: Most tender at LLQ. LUQ, and epigastric tenderness improved compared to previous.   Musculoskeletal:         General: No swelling, tenderness or deformity.      Right lower leg: No edema.      Left lower leg: No edema.   Neurological:      Mental Status: She is alert and oriented to person, place, and time.      Motor: No weakness.     Fluids    Intake/Output Summary (Last 24 hours) at 1/24/2023 0940  Last data filed at 1/24/2023 0900  Gross per 24 hour   Intake 360 ml   Output --   Net 360 ml       Laboratory                        Imaging  CT-ABDOMEN-PELVIS WITH    Result Date: 1/23/2023  1.  Redemonstrated findings of acute diverticulitis of the distal sigmoid colon. Follow-up colonoscopy after treatment to exclude underlying malignancy as clinically indicated. No evidence of abscess or free air. 2.  Borderline hepatomegaly.    CT-ABDOMEN-PELVIS WITH    Result Date: 1/18/2023  1.  There is sigmoid diverticulosis with heterogeneous hypodensities in the wall of the sigmoid colon with colonic stranding. While this could represent changes of diverticulitis, underlying mass is also a possibility based on this appearance. 2.  No other interval changes.    CTA ABDOMEN PELVIS W & W/O POST PROCESS    Result Date: 1/20/2023  1.  Diverticulosis with area of distal sigmoid and rectal colonic wall thickening with adjacent hazy fat stranding likely corresponding with component of diverticulitis. Recommend follow-up CT or colonoscopy following treatment to exclude inflammatory carcinoma which can have similar radiographic appearance. 2.  Subtle enhancing lesion in the right hepatic lobe, recommend follow-up MRI liver for further characterization. 3.  Irregular hepatic contour favoring changes of cirrhosis 4.  Fat-containing right Bochdalek hernia 5.  Fat-containing left inguinal hernia    NM-GASTRIC EMPTYING    Addendum Date: 1/21/2023    There is an error in the body section of the report. It should be as  follows: FINDINGS: There was no evidence of gastro-esophageal reflux during 90 minutes of continuous observation.  Gastric emptying was DELAYED.  Gastric half emptying time is 234 minutes. Delayed gastric emptying.     Result Date: 1/21/2023  Delayed gastric emptying.         Assessment/Plan  Problem Representation:    * Left Lower Quadrant Pain - (present on admission)  Assessment & Plan  Ddx diverticulitis vs colon cancer. Perforation or abscess less likely.   -Mesenteric ischemia was considered as pt has hx smoking and chronic pain exacerbated immediately after food ingestion. Pain also located in watershed area. CT 1/18 also noted atherosclerosis. However, CTA A/P noted diverticulosis with area of distal sigmoid and rectal colonic wall thickening with adjacent hazy fat stranding likely corresponding with component of diverticulitis. Subtle enhancing lesion in the right hepatic lobe, irregular hepatic contour favoring changes of cirrhosis, Fat-containing right Bochdalek hernia, and Fat-containing left inguinal hernia  -LLQ pain returned 1/23.   -CT 1/23 noted diverticulitis, did not note any significant changes from previous CT.   Plan:  -Pain meds- tylenol. Will consider adding oxycodone 5mg if not controlled with tylenol (aware that oxycodone can cause constipation however, pain medication options are limited).   -Cipro and Metronidazole for 10 days.   -liquid diet      Gastroparesis   Assessment & Plan  Ddx idiopathic (most common cause of gastroparesis), viral, neurological, or autoimmune.   -Reviewed pt's medications, none associated with gastroparesis.   -no hx diabetes.   Plan:   -Continue Mylanta   -PRN GI Cocktail   -Reglan PRN before meals and before bed.   -Will likely need further outpt follow up to find cause.   -tolerating liquid diet.      Lesion right hepatic lobe (incidental finding)  Assessment & Plan  -CT noted subtle enhancing lesion in the right hepatic lobe and recommended f/u w/ MRI.    -Repeat CT 1/23 did not note hepatic lesion.   -f/u outpt.      Gastroesophageal reflux disease  Assessment & Plan  -Pt reported hx hiatal hernia however on further chart review found CT scan from 2004 that noted small Bochdalek hernia. No hiatal hernia noted.   -No gastroesophageal reflux seen on gastric emptying study.   Plan:  -Omeprazole to 20 daily.      Benign essential HTN- (present on admission)  Assessment & Plan  -BP elevated on admission   -Continue home carvedilol and lisinopril.      Hypothyroidism  Assessment & Plan  -continue home levothyroxine      Mixed hyperlipidemia- (present on admission)  Assessment & Plan  -continue home atorvastatin      Other specified glaucoma  Assessment & Plan  -continue home latanoprost       VTE prophylaxis: Xarelto 10 mg daily as prophylaxis    I have performed a physical exam and reviewed and updated ROS and Plan today (1/24/2023). In review of yesterday's note (1/23/2023), there are no changes except as documented above.

## 2023-01-24 NOTE — CARE PLAN
The patient is Stable - Low risk of patient condition declining or worsening    Shift Goals  Clinical Goals: Pain management, PO ABX, increase mobility  Patient Goals: Rest and comfort      Problem: Fall Risk  Goal: Patient will remain free from falls  Outcome: Progressing     Problem: Knowledge Deficit - Standard  Goal: Patient and family/care givers will demonstrate understanding of plan of care, disease process/condition, diagnostic tests and medications  Outcome: Progressing     Problem: Pain - Standard  Goal: Alleviation of pain or a reduction in pain to the patient’s comfort goal  Outcome: Progressing       Progress made toward(s) clinical / shift goals:  Patient updated on the plan of care. All questions answered. Pain assessed. Medicated per MAR. Fall precautions in place. Care ongoing.

## 2023-01-24 NOTE — DIETARY
Nutrition Services Brief Update:    Day 6 of admit.  Maryam Gomez is a 77 y.o. female with admitting DX of Diverticulosis.     Current Diet: Clear liquid. Pt previously on level 6 soft and bite sized with level 0 thin liquids. Recent intake % of meals with 1 refused meal. Per MD note pt on CLD d/t return of LLQ pain with epigastric and LUQ pain and nausea. CT of abdomen ordered, if no significant finding on CT plan to dc once adequate PO. Recommend diet advancements past CLD as medically feasible per MD.     Problem: Nutritional:  Goal: Achieve adequate nutritional intake  Description: Patient will consume >50-75% of meals  Outcome: Not met    RD following

## 2023-01-25 VITALS
HEART RATE: 97 BPM | TEMPERATURE: 98.3 F | SYSTOLIC BLOOD PRESSURE: 147 MMHG | RESPIRATION RATE: 16 BRPM | WEIGHT: 160 LBS | DIASTOLIC BLOOD PRESSURE: 89 MMHG | OXYGEN SATURATION: 91 % | BODY MASS INDEX: 30.21 KG/M2 | HEIGHT: 61 IN

## 2023-01-25 PROBLEM — K57.92 ACUTE DIVERTICULITIS: Status: ACTIVE | Noted: 2023-01-18

## 2023-01-25 PROBLEM — K44.9 HIATAL HERNIA: Status: ACTIVE | Noted: 2023-01-25

## 2023-01-25 PROBLEM — K31.84 NONDIABETIC GASTROPARESIS: Status: ACTIVE | Noted: 2023-01-25

## 2023-01-25 PROCEDURE — 99239 HOSP IP/OBS DSCHRG MGMT >30: CPT | Mod: GC | Performed by: HOSPITALIST

## 2023-01-25 PROCEDURE — A9270 NON-COVERED ITEM OR SERVICE: HCPCS

## 2023-01-25 PROCEDURE — 700102 HCHG RX REV CODE 250 W/ 637 OVERRIDE(OP)

## 2023-01-25 RX ORDER — METOCLOPRAMIDE 5 MG/1
5 TABLET ORAL
Qty: 120 TABLET | Refills: 3 | Status: SHIPPED | OUTPATIENT
Start: 2023-01-25 | End: 2023-02-24

## 2023-01-25 RX ORDER — METRONIDAZOLE 500 MG/1
500 TABLET ORAL EVERY 8 HOURS
Qty: 9 TABLET | Refills: 0 | Status: ACTIVE | OUTPATIENT
Start: 2023-01-25 | End: 2023-01-28

## 2023-01-25 RX ORDER — CIPROFLOXACIN 500 MG/1
500 TABLET, FILM COATED ORAL EVERY 12 HOURS
Qty: 6 TABLET | Refills: 0 | Status: ACTIVE | OUTPATIENT
Start: 2023-01-25 | End: 2023-01-28

## 2023-01-25 RX ADMIN — LIDOCAINE HYDROCHLORIDE 15 ML: 20 SOLUTION OROPHARYNGEAL at 03:32

## 2023-01-25 RX ADMIN — ACETAMINOPHEN 1000 MG: 500 TABLET ORAL at 01:08

## 2023-01-25 RX ADMIN — METRONIDAZOLE 500 MG: 500 TABLET ORAL at 05:54

## 2023-01-25 RX ADMIN — CIPROFLOXACIN 500 MG: 500 TABLET, FILM COATED ORAL at 05:54

## 2023-01-25 RX ADMIN — METOCLOPRAMIDE 5 MG: 10 TABLET ORAL at 05:54

## 2023-01-25 RX ADMIN — LEVOTHYROXINE SODIUM 100 MCG: 0.1 TABLET ORAL at 05:54

## 2023-01-25 ASSESSMENT — PAIN DESCRIPTION - PAIN TYPE: TYPE: ACUTE PAIN

## 2023-01-25 NOTE — DISCHARGE INSTRUCTIONS
Diverticulosis    Diverticulosis is a condition that develops when small pouches (diverticula) form in the wall of the large intestine (colon). The colon is where water is absorbed and stool is formed. The pouches form when the inside layer of the colon pushes through weak spots in the outer layers of the colon. You may have a few pouches or many of them.  What are the causes?  The cause of this condition is not known.  What increases the risk?  The following factors may make you more likely to develop this condition:  Being older than age 60. Your risk for this condition increases with age. Diverticulosis is rare among people younger than age 30. By age 80, many people have it.  Eating a low-fiber diet.  Having frequent constipation.  Being overweight.  Not getting enough exercise.  Smoking.  Taking over-the-counter pain medicines, like aspirin and ibuprofen.  Having a family history of diverticulosis.  What are the signs or symptoms?  In most people, there are no symptoms of this condition. If you do have symptoms, they may include:  Bloating.  Cramps in the abdomen.  Constipation or diarrhea.  Pain in the lower left side of the abdomen.  How is this diagnosed?  This condition is most often diagnosed during an exam for other colon problems. Because diverticulosis usually has no symptoms, it often cannot be diagnosed independently. This condition may be diagnosed by:  Using a flexible scope to examine the colon (colonoscopy).  Taking an X-ray of the colon after dye has been put into the colon (barium enema).  Doing a CT scan.  How is this treated?  You may not need treatment for this condition if you have never developed an infection related to diverticulosis. If you have had an infection before, treatment may include:  Eating a high-fiber diet. This may include eating more fruits, vegetables, and grains.  Taking a fiber supplement.  Taking a live bacteria supplement (probiotic).  Taking medicine to relax your  colon.  Taking antibiotic medicines.  Follow these instructions at home:  Drink 6-8 glasses of water or more each day to prevent constipation.  Try not to strain when you have a bowel movement.  If you have had an infection before:  Eat more fiber as directed by your health care provider or your diet and nutrition specialist (dietitian).  Take a fiber supplement or probiotic, if your health care provider approves.  Take over-the-counter and prescription medicines only as told by your health care provider.  If you were prescribed an antibiotic, take it as told by your health care provider. Do not stop taking the antibiotic even if you start to feel better.  Keep all follow-up visits as told by your health care provider. This is important.  Contact a health care provider if:  You have pain in your abdomen.  You have bloating.  You have cramps.  You have not had a bowel movement in 3 days.  Get help right away if:  Your pain gets worse.  Your bloating becomes very bad.  You have a fever or chills, and your symptoms suddenly get worse.  You vomit.  You have bowel movements that are bloody or black.  You have bleeding from your rectum.  Summary  Diverticulosis is a condition that develops when small pouches (diverticula) form in the wall of the large intestine (colon).  You may have a few pouches or many of them.  This condition is most often diagnosed during an exam for other colon problems.  If you have had an infection related to diverticulosis, treatment may include increasing the fiber in your diet, taking supplements, or taking medicines.  This information is not intended to replace advice given to you by your health care provider. Make sure you discuss any questions you have with your health care provider.  Document Released: 09/14/2005 Document Revised: 11/30/2018 Document Reviewed: 11/06/2017  Social IQ (Social Influence Quotient) Patient Education © 2020 [employer]

## 2023-01-25 NOTE — PROGRESS NOTES
Patient arrived to [facility]. Discharge paper work over viewed with patient. Educated on new medications (to  at home pharmacy) and follow up appointment. Patient awaiting ride with friend.

## 2023-01-25 NOTE — CARE PLAN
The patient is Stable - Low risk of patient condition declining or worsening    Shift Goals  Clinical Goals: BP control, improve mobility  Patient Goals: Rest and comfort  Family Goals: N/A      Problem: Fall Risk  Goal: Patient will remain free from falls  Outcome: Progressing     Problem: Knowledge Deficit - Standard  Goal: Patient and family/care givers will demonstrate understanding of plan of care, disease process/condition, diagnostic tests and medications  Outcome: Progressing     Problem: Pain - Standard  Goal: Alleviation of pain or a reduction in pain to the patient’s comfort goal  Outcome: Progressing       Progress made toward(s) clinical / shift goals:  Patient updated on the plan of care. All questions answered. Pain assessed. Medicated per MAR. Fall precautions in place. Care ongoing.

## 2023-01-25 NOTE — PROGRESS NOTES
PT dressed and sitting on side of bed waiting for transport to come take her to discharge agustin.

## 2023-01-25 NOTE — PROGRESS NOTES
Assumed care of patient 0700. Received Report from [facility] nurse. Patient A&O 4, on RA, Reporting a pain level of 0. Call light within reach, belongings within reach, Fall precautions in place, and bed alarm is on and bed in lowest position. Patient does not have any other needs at this time.

## 2023-01-25 NOTE — PROGRESS NOTES
Cross coverage note    I was notified by the bedside RN about BP 86/49.     I went to the bedside to assess the patient.  She is alert and oriented x3 and laying in bed comfortably.  She denied any abdominal pain, presyncope, vomiting, diarrhea, chest pain, palpitations, fever, cough, or dysuria.  Benign abdominal exam with warm extremities.    Assessment/Plan:  She is probably a bit hypovolemic.  Her exam is reassuring and she does not appear infectious.  I ordered a 1 L fluid bolus along with some labs and a lactic since she did have a mild anion gap metabolic acidosis earlier today.  Held carvedilol for tomorrow morning.    Update 2200: BP improved to 128/82 after bolus per RN      Nicolás Anthony M.D.  Internal Medicine, PGY-3

## 2023-04-26 ENCOUNTER — HOSPITAL ENCOUNTER (OUTPATIENT)
Dept: RADIOLOGY | Facility: MEDICAL CENTER | Age: 78
End: 2023-04-26
Attending: INTERNAL MEDICINE
Payer: MEDICARE

## 2023-04-26 DIAGNOSIS — K57.30 DIVERTICULOSIS, SIGMOID: ICD-10-CM

## 2023-04-26 DIAGNOSIS — K55.9 ISCHEMIC COLITIS (HCC): ICD-10-CM

## 2023-04-26 PROCEDURE — 700117 HCHG RX CONTRAST REV CODE 255: Performed by: INTERNAL MEDICINE

## 2023-04-26 PROCEDURE — 74177 CT ABD & PELVIS W/CONTRAST: CPT

## 2023-04-26 RX ADMIN — IOHEXOL 25 ML: 240 INJECTION, SOLUTION INTRATHECAL; INTRAVASCULAR; INTRAVENOUS; ORAL at 10:34

## 2023-04-26 RX ADMIN — IOHEXOL 100 ML: 350 INJECTION, SOLUTION INTRAVENOUS at 10:34

## 2023-11-06 NOTE — PROGRESS NOTES
New Patient Consult Note for Endocrinology  Referred by: Baltazar Rojas M.D.    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 lady, here for evaluation for possible adrenal insufficiency.     Low cortisol level:  - was noted on labs in 8/2023  - patient admits having trigger steroid shots (betametasone) x monthly x several years by ortho due to severe back pain  - patient also has gastroparesis, has occasional nausea, prior had gastric ulcers  - weight loss - in January lost 20 lb in a week - stress, bad food - , gained 5 lb back  - fatigue - no more than usual    Past Medical History:  Patient Active Problem List    Diagnosis Date Noted    History of Hiatal hernia 01/25/2023    Nondiabetic gastroparesis 01/25/2023    Acute diverticulitis 01/18/2023    Gastroesophageal reflux disease 01/18/2023    Hypothyroidism 01/18/2023    Other specified glaucoma 01/18/2023    Nausea 01/18/2023    Lumbar degenerative disc disease 06/29/2021    Lumbar radiculitis 06/29/2021    Myalgia 06/29/2021    Right shoulder pain 05/18/2016    Benign essential HTN 10/12/2015    Osteoarthritis 11/14/2012    Anxiety 03/19/2012    Diastolic dysfunction 03/16/2012    Edema 03/16/2012    Mixed hyperlipidemia 03/16/2012    Palpitations 03/16/2012     Past Surgical History:  Past Surgical History:   Procedure Laterality Date    CLOSED REDUCTION Left 5/16/2021    Procedure: CLOSED REDUCTION;  Surgeon: Baltazar Hamilton M.D.;  Location: [facility];  Service: Orthopedics    COLONOSCOPY N/A 8/4/2018    Procedure: COLONOSCOPY;  Surgeon: Lucy Carter M.D.;  Location: [facility];  Service: Gastroenterology    SHOULDER DECOMPRESSION ARTHROSCOPIC Right 5/18/2016    Procedure: SHOULDER DECOMPRESSION ARTHROSCOPIC - SUBACROMIAL;  Surgeon: Joseph Ricketts M.D.;  Location: [facility];  Service:     SHOULDER ARTHROSCOPY W/ ROTATOR CUFF  REPAIR Right 2016    Procedure: SHOULDER ARTHROSCOPY W/ ROTATOR CUFF REPAIR ;  Surgeon: Joseph Ricketts M.D.;  Location: SURGERY [facility];  Service:     HIP ARTHROPLASTY TOTAL  2012    Performed by Joseph Ricketts M.D. at SURGERY [facility]    FOOT SURGERY  1993    OTHER      nose    HYSTERECTOMY LAPAROSCOPY      TONSILLECTOMY       Allergies:  Patient has no known allergies.    Social History:  Social History     Socioeconomic History    Marital status:      Spouse name: Not on file    Number of children: Not on file    Years of education: Not on file    Highest education level: Not on file   Occupational History    Not on file   Tobacco Use    Smoking status: Former     Current packs/day: 0.00     Average packs/day: 1 pack/day for 27.0 years (27.0 ttl pk-yrs)     Types: Cigarettes     Start date: 1962     Quit date: 1989     Years since quittin.8    Smokeless tobacco: Never    Tobacco comments:     Quit    Vaping Use    Vaping Use: Never used   Substance and Sexual Activity    Alcohol use: Yes     Comment: 3 per day    Drug use: No    Sexual activity: Not on file   Other Topics Concern    Not on file   Social History Narrative    Not on file     Social Determinants of Health     Financial Resource Strain: Not on file   Food Insecurity: Not on file   Transportation Needs: Not on file   Physical Activity: Not on file   Stress: Not on file   Social Connections: Not on file   Intimate Partner Violence: Not on file   Housing Stability: Not on file     Family History:  Family History   Problem Relation Age of Onset    Heart Attack Neg Hx     Heart Disease Neg Hx     Heart Failure Neg Hx        Medications:  Current Outpatient Medications:     lansoprazole (PREVACID) 30 MG CAPSULE DELAYED RELEASE, Take 30 mg by mouth every day., Disp: , Rfl:     Cholecalciferol (VITAMIN D3) 50 MCG (2000) Tab, Take 2,000 Units by mouth every morning., Disp: , Rfl:  "    carvedilol (COREG) 6.25 MG Tab, Take 6.25 mg by mouth 2 times a day., Disp: , Rfl:     atorvastatin (LIPITOR) 20 MG Tab, Take 1 Tab by mouth every evening., Disp: 30 Tab, Rfl: 11    latanoprost (XALATAN) 0.005 % SOLN, Administer 1 Drop into both eyes 1/2 hour after lunch., Disp: , Rfl:     levothyroxine (SYNTHROID) 100 MCG TABS, Take 100 mcg by mouth see administration instructions. Monday, Tuesday, Wednesday, Thursday, Friday ONLY, Disp: , Rfl:     Physical Examination:   Vital signs: /88 (BP Location: Right arm, Patient Position: Sitting, BP Cuff Size: Adult)   Pulse 78   Ht 1.549 m (5' 1\")   Wt 76.7 kg (169 lb)   SpO2 93%   BMI 31.93 kg/m²   General: No distress, cooperative, well dressed and well nourished.   Eyes: No scleral icterus or discharge  ENMT: Normal on external inspection of nose, lips, No nasal drainage   Neck: No abnormal masses on inspection  Resp: Normal effort.  CVS: Regular rate and rhythm  Extremities: No edema bilateral extremities  Neuro: Alert and oriented  Skin: No rash, No Ulcers  Psych: Normal mood and affect    Labs:  - reviewed  8/1/23:   Cortisol - 1.9 (6.2 - 19.4)  Albumin - 4.1 (3.8 - 4.8)  TSH - 0.691  Glu  - 96  Creatinine - 0.62  GFR - 90  K - 4.6  Na - 140        Assessment and Plan:  1. Abnormal cortisol level  - likely secondary to steroid intraarticular injection - normal response   - no clear symptoms of adrenal insufficiency   - weight loss could be related to stress and poor oral intake, nausea with gastroparesis  - unlikely to develop secondary adrenal insufficiency secondary to steroid injections given its frequency  - still reasonable to proceed with ACTH-stim test (already ordered by another specialist) to completely rule out adrenal insufficiency  - even unlikely to interfere, but would try to do ACTH-stim test apart from steroid shot  - referral diagnosis is likely to be erroneous as 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   Proceed with ACTH-stim test - will separate in time from planned steroid shot    RTC: when test results are obtained    Total time (face-to-face and non-face-to face time): 60 min - extensive discussion of differential diagnoses, medical charts, lab review, documentation.    Plan reviewed with the patient and agreed with plan.  All questions answered to patient's satisfaction.  Thank you kindly for allowing me to participate in the care plan for this patient.    Leah Flores MD    CC:   Baltazar Rojas M.D.

## 2023-11-10 ENCOUNTER — OFFICE VISIT (OUTPATIENT)
Dept: ENDOCRINOLOGY | Facility: MEDICAL CENTER | Age: 78
End: 2023-11-10
Attending: STUDENT IN AN ORGANIZED HEALTH CARE EDUCATION/TRAINING PROGRAM
Payer: MEDICARE

## 2023-11-10 VITALS
DIASTOLIC BLOOD PRESSURE: 88 MMHG | HEART RATE: 78 BPM | HEIGHT: 61 IN | OXYGEN SATURATION: 93 % | BODY MASS INDEX: 31.91 KG/M2 | WEIGHT: 169 LBS | SYSTOLIC BLOOD PRESSURE: 124 MMHG

## 2023-11-10 DIAGNOSIS — R79.89 ABNORMAL CORTISOL LEVEL: ICD-10-CM

## 2023-11-10 PROCEDURE — 99205 OFFICE O/P NEW HI 60 MIN: CPT | Performed by: STUDENT IN AN ORGANIZED HEALTH CARE EDUCATION/TRAINING PROGRAM

## 2023-11-10 PROCEDURE — 99211 OFF/OP EST MAY X REQ PHY/QHP: CPT | Performed by: STUDENT IN AN ORGANIZED HEALTH CARE EDUCATION/TRAINING PROGRAM

## 2023-11-10 PROCEDURE — 3074F SYST BP LT 130 MM HG: CPT | Performed by: STUDENT IN AN ORGANIZED HEALTH CARE EDUCATION/TRAINING PROGRAM

## 2023-11-10 PROCEDURE — 3079F DIAST BP 80-89 MM HG: CPT | Performed by: STUDENT IN AN ORGANIZED HEALTH CARE EDUCATION/TRAINING PROGRAM

## 2023-11-10 RX ORDER — LISINOPRIL 10 MG/1
10 TABLET ORAL DAILY
COMMUNITY

## 2023-11-10 RX ORDER — FAMOTIDINE 20 MG/1
20 TABLET, FILM COATED ORAL 2 TIMES DAILY
COMMUNITY

## 2023-11-10 ASSESSMENT — FIBROSIS 4 INDEX: FIB4 SCORE: 1.46

## 2023-11-13 ENCOUNTER — TELEPHONE (OUTPATIENT)
Dept: ENDOCRINOLOGY | Facility: MEDICAL CENTER | Age: 78
End: 2023-11-13
Payer: MEDICARE

## 2023-11-13 NOTE — TELEPHONE ENCOUNTER
Patient called in regards to giving the steroid medication in which she is taking-6mg Betamathasone.

## 2023-11-28 ENCOUNTER — HOSPITAL ENCOUNTER (OUTPATIENT)
Dept: LAB | Facility: MEDICAL CENTER | Age: 78
End: 2023-11-28
Attending: PHYSICIAN ASSISTANT
Payer: MEDICARE

## 2023-11-28 ENCOUNTER — TELEPHONE (OUTPATIENT)
Dept: ENDOCRINOLOGY | Facility: MEDICAL CENTER | Age: 78
End: 2023-11-28

## 2023-11-28 DIAGNOSIS — R94.7 NONE TO LOW SERUM CORTISOL RESPONSE WITH ADRENOCORTICOTROPHIC HORMONE (ACTH) STIMULATION TEST: ICD-10-CM

## 2023-11-28 PROCEDURE — 82024 ASSAY OF ACTH: CPT

## 2023-11-28 PROCEDURE — 36415 COLL VENOUS BLD VENIPUNCTURE: CPT

## 2023-11-28 RX ORDER — 0.9 % SODIUM CHLORIDE 0.9 %
10 VIAL (ML) INJECTION PRN
Status: CANCELLED | OUTPATIENT
Start: 2023-11-28

## 2023-11-28 RX ORDER — COSYNTROPIN 0.25 MG/ML
250 INJECTION, POWDER, FOR SOLUTION INTRAMUSCULAR; INTRAVENOUS ONCE
Status: CANCELLED | OUTPATIENT
Start: 2023-11-28 | End: 2023-11-28

## 2023-11-28 RX ORDER — 0.9 % SODIUM CHLORIDE 0.9 %
3 VIAL (ML) INJECTION PRN
Status: CANCELLED | OUTPATIENT
Start: 2023-11-28

## 2023-11-28 RX ORDER — 0.9 % SODIUM CHLORIDE 0.9 %
VIAL (ML) INJECTION PRN
Status: CANCELLED | OUTPATIENT
Start: 2023-11-28

## 2023-11-28 NOTE — TELEPHONE ENCOUNTER
Patient is needing Cortisol testing as she was advised by lab that there was no order. She was only able to get ACTH drawn this morning. Please advise and I can give patient a call for the update.     Thank you,

## 2023-11-30 LAB — ACTH PLAS-MCNC: 22.4 PG/ML (ref 7.2–63.3)

## 2023-12-03 NOTE — PROGRESS NOTES
Follow up Endocrinology Visit  Initial consult/last visit on: 11/10/23  Referred by: Baltazar Rojas M.D.    Chief complaint:  Maryam Gomez is a very pleasant 78 y.o. lady, who is here for follow up for evaluation for possible adrenal insufficiency.     Interval history:   - since last visit    Low cortisol level:  - was noted on labs in 8/2023  - patient admits having trigger steroid shots (betametasone) x monthly x several years by ortho due to severe back pain  - patient also has gastroparesis, has occasional nausea, prior had gastric ulcers  - weight loss - in January lost 20 lb in a week - stress, bad food - , gained 5 lb back  - fatigue - no more than usual    Medications:  Current Outpatient Medications:     lisinopril (PRINIVIL) 10 MG Tab, Take 10 mg by mouth every day., Disp: , Rfl:     famotidine (PEPCID) 20 MG Tab, Take 20 mg by mouth 2 times a day., Disp: , Rfl:     multivitamin Tab, Take 1 Tablet by mouth every day., Disp: , Rfl:     B Complex Vitamins (B COMPLEX 1 PO), Take  by mouth., Disp: , Rfl:     Acetaminophen (TYLENOL 8 HOUR PO), Take  by mouth., Disp: , Rfl:     lansoprazole (PREVACID) 30 MG CAPSULE DELAYED RELEASE, Take 30 mg by mouth every day., Disp: , Rfl:     Cholecalciferol (VITAMIN D3) 50 MCG (2000 UT) Tab, Take 2,000 Units by mouth every morning., Disp: , Rfl:     carvedilol (COREG) 6.25 MG Tab, Take 6.25 mg by mouth 2 times a day., Disp: , Rfl:     atorvastatin (LIPITOR) 20 MG Tab, Take 1 Tab by mouth every evening., Disp: 30 Tab, Rfl: 11    latanoprost (XALATAN) 0.005 % SOLN, Administer 1 Drop into both eyes 1/2 hour after lunch., Disp: , Rfl:     levothyroxine (SYNTHROID) 100 MCG TABS, Take 100 mcg by mouth see administration instructions. Monday, Tuesday, Wednesday, Thursday, Friday ONLY, Disp: , Rfl:     Physical Examination:   Vital signs: There were no vitals taken for this visit.  General: No distress, cooperative, well dressed and well nourished.   Eyes: No scleral  icterus or discharge  ENMT: Normal on external inspection of nose, lips, No nasal drainage   Neck: No abnormal masses on inspection  Resp: Normal effort.  CVS: Regular rate and rhythm  Extremities: No edema bilateral extremities  Neuro: Alert and oriented  Skin: No rash, No Ulcers  Psych: Normal mood and affect    Labs:  - reviewed  8/1/23:   Cortisol - 1.9 (6.2 - 19.4)  Albumin - 4.1 (3.8 - 4.8)  TSH - 0.691  Glu  - 96  Creatinine - 0.62  GFR - 90  K - 4.6  Na - 140        Assessment and Plan:  1. Abnormal cortisol level  - likely secondary to steroid intraarticular injection - normal response   - no clear symptoms of adrenal insufficiency   - weight loss could be related to stress and poor oral intake, nausea with gastroparesis  - unlikely to develop secondary adrenal insufficiency secondary to steroid injections given its frequency  - still reasonable to proceed with ACTH-stim test (already ordered by another specialist) to completely rule out adrenal insufficiency  - even unlikely to interfere, but would try to do ACTH-stim test apart from steroid shot  - referral diagnosis is likely to be erroneous 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   Proceed with ACTH-stim test - will separate in time from planned steroid shot    RTC: when test results are obtained    Total time (face-to-face and non-face-to face time): 60 min - extensive discussion of differential diagnoses, medical charts, lab review, documentation.    Plan reviewed with the patient and agreed with plan.  All questions answered to patient's satisfaction.  Thank you kindly for allowing me to participate in the care plan for this patient.    Leah Flores MD    CC:   Baltazar Rojas M.D.

## 2023-12-04 ENCOUNTER — TELEPHONE (OUTPATIENT)
Dept: ENDOCRINOLOGY | Facility: MEDICAL CENTER | Age: 78
End: 2023-12-04
Payer: MEDICARE

## 2023-12-05 ENCOUNTER — APPOINTMENT (OUTPATIENT)
Dept: ENDOCRINOLOGY | Facility: MEDICAL CENTER | Age: 78
End: 2023-12-05
Attending: STUDENT IN AN ORGANIZED HEALTH CARE EDUCATION/TRAINING PROGRAM
Payer: MEDICARE

## 2024-01-02 ENCOUNTER — APPOINTMENT (OUTPATIENT)
Dept: ONCOLOGY | Facility: MEDICAL CENTER | Age: 79
End: 2024-01-02
Attending: STUDENT IN AN ORGANIZED HEALTH CARE EDUCATION/TRAINING PROGRAM
Payer: MEDICARE

## 2024-03-12 ENCOUNTER — OUTPATIENT INFUSION SERVICES (OUTPATIENT)
Dept: ONCOLOGY | Facility: MEDICAL CENTER | Age: 79
End: 2024-03-12
Attending: STUDENT IN AN ORGANIZED HEALTH CARE EDUCATION/TRAINING PROGRAM
Payer: MEDICARE

## 2024-03-12 VITALS
SYSTOLIC BLOOD PRESSURE: 149 MMHG | BODY MASS INDEX: 33.85 KG/M2 | WEIGHT: 172.4 LBS | DIASTOLIC BLOOD PRESSURE: 80 MMHG | HEART RATE: 71 BPM | TEMPERATURE: 97.3 F | RESPIRATION RATE: 18 BRPM | HEIGHT: 60 IN | OXYGEN SATURATION: 97 %

## 2024-03-12 DIAGNOSIS — R11.0 NAUSEA: ICD-10-CM

## 2024-03-12 DIAGNOSIS — E27.49 HYPOCORTISOLISM (HCC): Primary | ICD-10-CM

## 2024-03-12 LAB
CORTIS SERPL-MCNC: 1.7 UG/DL (ref 0–23)
CORTIS SERPL-MCNC: 5.1 UG/DL (ref 0–23)
CORTIS SERPL-MCNC: 6.5 UG/DL (ref 0–23)

## 2024-03-12 PROCEDURE — 700111 HCHG RX REV CODE 636 W/ 250 OVERRIDE (IP): Mod: JZ | Performed by: STUDENT IN AN ORGANIZED HEALTH CARE EDUCATION/TRAINING PROGRAM

## 2024-03-12 PROCEDURE — 82533 TOTAL CORTISOL: CPT | Mod: 91

## 2024-03-12 PROCEDURE — 82024 ASSAY OF ACTH: CPT

## 2024-03-12 PROCEDURE — 96374 THER/PROPH/DIAG INJ IV PUSH: CPT

## 2024-03-12 RX ORDER — 0.9 % SODIUM CHLORIDE 0.9 %
VIAL (ML) INJECTION PRN
OUTPATIENT
Start: 2024-03-12

## 2024-03-12 RX ORDER — COSYNTROPIN 0.25 MG/ML
250 INJECTION, POWDER, FOR SOLUTION INTRAMUSCULAR; INTRAVENOUS ONCE
Status: CANCELLED | OUTPATIENT
Start: 2024-03-12 | End: 2024-03-12

## 2024-03-12 RX ORDER — 0.9 % SODIUM CHLORIDE 0.9 %
10 VIAL (ML) INJECTION PRN
OUTPATIENT
Start: 2024-03-12

## 2024-03-12 RX ORDER — 0.9 % SODIUM CHLORIDE 0.9 %
3 VIAL (ML) INJECTION PRN
OUTPATIENT
Start: 2024-03-12

## 2024-03-12 RX ORDER — COSYNTROPIN 0.25 MG/ML
250 INJECTION, POWDER, FOR SOLUTION INTRAMUSCULAR; INTRAVENOUS ONCE
Status: COMPLETED | OUTPATIENT
Start: 2024-03-12 | End: 2024-03-12

## 2024-03-12 RX ADMIN — COSYNTROPIN 250 MCG: 0.25 INJECTION, POWDER, LYOPHILIZED, FOR SOLUTION INTRAMUSCULAR; INTRAVENOUS at 08:34

## 2024-03-12 ASSESSMENT — FIBROSIS 4 INDEX: FIB4 SCORE: 1.46

## 2024-03-12 NOTE — PROGRESS NOTES
Patient arrived to unit for a Stim Test. She complains of right shoulder pain due to osteoarthritis. Otherwise, no other complaints. VSS. ACTH and Cortisol drawn from IV prior to cosyntropin administration.    Cosyntropin administered.    Cortisol drawn from IV 30 minutes post-cosyntropin administration.     Cortisol drawn from IV 60 minutes post-cosyntropin administration.     Patient tolerated treatment without any adverse effects. Patient discharged home in stable condition.

## 2024-03-13 LAB — ACTH PLAS-MCNC: 10.7 PG/ML (ref 7.2–63.3)

## 2024-03-20 PROBLEM — G56.03 CARPAL TUNNEL SYNDROME, BILATERAL: Status: ACTIVE | Noted: 2024-03-20

## (undated) DEVICE — TUBE NG SALEM SUMP 14FR (50EA/BX)

## (undated) DEVICE — LACTATED RINGERS INJ 1000 ML - (14EA/CA 60CA/PF)

## (undated) DEVICE — FILM CASSETTE ENDO

## (undated) DEVICE — NEPTUNE 4 PORT MANIFOLD - (20/PK)

## (undated) DEVICE — ELECTRODE 850 FOAM ADHESIVE - HYDROGEL RADIOTRNSPRNT (50/PK)

## (undated) DEVICE — MANIFOLD NEPTUNE 1 PORT (20/PK)

## (undated) DEVICE — CANISTER SUCTION 3000ML MECHANICAL FILTER AUTO SHUTOFF MEDI-VAC NONSTERILE LF DISP  (40EA/CA)

## (undated) DEVICE — SPONGE GAUZE NON-STERILE 4X4 - (2000/CA 10PK/CA)

## (undated) DEVICE — CANISTER SUCTION RIGID RED 1500CC (40EA/CA)

## (undated) DEVICE — PROTECTOR ULNA NERVE - (36PR/CA)

## (undated) DEVICE — GOWN SURGEONS X-LARGE - DISP. (30/CA)

## (undated) DEVICE — GOWN WARMING STANDARD FLEX - (30/CA)

## (undated) DEVICE — TUBING CLEARLINK DUO-VENT - C-FLO (48EA/CA)

## (undated) DEVICE — MASK ANESTHESIA ADULT  - (100/CA)

## (undated) DEVICE — CATHETER IV 20 GA X 1-1/4 ---SURG.& SDS ONLY--- (50EA/BX)

## (undated) DEVICE — HEAD HOLDER JUNIOR/ADULT

## (undated) DEVICE — KIT CUSTOM PROCEDURE SINGLE FOR ENDO  (15/CA)

## (undated) DEVICE — SUCTION INSTRUMENT YANKAUER BULBOUS TIP W/O VENT (50EA/CA)

## (undated) DEVICE — TOWEL STOP TIMEOUT SAFETY FLAG (40EA/CA)

## (undated) DEVICE — SET LEADWIRE 5 LEAD BEDSIDE DISPOSABLE ECG (1SET OF 5/EA)

## (undated) DEVICE — KIT ANESTHESIA W/CIRCUIT & 3/LT BAG W/FILTER (20EA/CA)

## (undated) DEVICE — SENSOR SPO2 NEO LNCS ADHESIVE (20/BX) SEE USER NOTES

## (undated) DEVICE — SET EXTENSION WITH 2 PORTS (48EA/CA) ***PART #2C8610 IS A SUBSTITUTE*****

## (undated) DEVICE — TUBE CONNECTING SUCTION - CLEAR PLASTIC STERILE 72 IN (50EA/CA)

## (undated) DEVICE — CONTAINER, SPECIMEN, STERILE

## (undated) DEVICE — BASIN EMESIS DISP. - (250/CA)